# Patient Record
Sex: FEMALE | Race: WHITE | NOT HISPANIC OR LATINO | Employment: OTHER | URBAN - METROPOLITAN AREA
[De-identification: names, ages, dates, MRNs, and addresses within clinical notes are randomized per-mention and may not be internally consistent; named-entity substitution may affect disease eponyms.]

---

## 2017-01-09 RX ORDER — ALENDRONATE SODIUM 70 MG/1
TABLET ORAL
Qty: 12 TABLET | Refills: 1 | Status: SHIPPED | OUTPATIENT
Start: 2017-01-09 | End: 2017-06-26 | Stop reason: SDUPTHER

## 2017-01-12 RX ORDER — LEVOTHYROXINE SODIUM 50 MCG
TABLET ORAL
Qty: 90 TABLET | Refills: 0 | Status: SHIPPED | OUTPATIENT
Start: 2017-01-12 | End: 2017-04-12 | Stop reason: SDUPTHER

## 2017-01-18 DIAGNOSIS — E78.5 HYPERLIPIDEMIA, UNSPECIFIED HYPERLIPIDEMIA TYPE: Primary | ICD-10-CM

## 2017-01-18 DIAGNOSIS — E03.9 HYPOTHYROIDISM, UNSPECIFIED TYPE: ICD-10-CM

## 2017-01-27 LAB
ALBUMIN SERPL-MCNC: 4.2 G/DL (ref 3.5–5.2)
ALBUMIN/GLOB SERPL: 2.1 G/DL
ALP SERPL-CCNC: 70 U/L (ref 39–117)
ALT SERPL-CCNC: 22 U/L (ref 1–33)
AST SERPL-CCNC: 21 U/L (ref 1–32)
BILIRUB SERPL-MCNC: 0.6 MG/DL (ref 0.1–1.2)
BUN SERPL-MCNC: 23 MG/DL (ref 8–23)
BUN/CREAT SERPL: 43.4 (ref 7–25)
CALCIUM SERPL-MCNC: 10.2 MG/DL (ref 8.6–10.5)
CHLORIDE SERPL-SCNC: 106 MMOL/L (ref 98–107)
CHOLEST SERPL-MCNC: 126 MG/DL (ref 100–199)
CO2 SERPL-SCNC: 30.1 MMOL/L (ref 22–29)
CREAT SERPL-MCNC: 0.53 MG/DL (ref 0.57–1)
GLOBULIN SER CALC-MCNC: 2 GM/DL
GLUCOSE SERPL-MCNC: 87 MG/DL (ref 65–99)
HDL SERPL-SCNC: 29.6 UMOL/L
HDLC SERPL-MCNC: 46 MG/DL
LDL SERPL QN: 20.1 NM
LDL SERPL-SCNC: 893 NMOL/L
LDL SMALL SERPL-SCNC: 572 NMOL/L
LDLC SERPL CALC-MCNC: 65 MG/DL (ref 0–99)
POTASSIUM SERPL-SCNC: 3.7 MMOL/L (ref 3.5–5.2)
PROT SERPL-MCNC: 6.2 G/DL (ref 6–8.5)
SODIUM SERPL-SCNC: 145 MMOL/L (ref 136–145)
T4 FREE SERPL-MCNC: 1.54 NG/DL (ref 0.93–1.7)
TRIGL SERPL-MCNC: 76 MG/DL (ref 0–149)
TSH SERPL DL<=0.005 MIU/L-ACNC: 2.74 MIU/ML (ref 0.27–4.2)

## 2017-01-30 RX ORDER — PAROXETINE 10 MG/1
TABLET, FILM COATED ORAL
Qty: 90 TABLET | Refills: 0 | Status: SHIPPED | OUTPATIENT
Start: 2017-01-30 | End: 2017-04-03 | Stop reason: SDUPTHER

## 2017-02-01 ENCOUNTER — OFFICE VISIT (OUTPATIENT)
Dept: FAMILY MEDICINE CLINIC | Facility: CLINIC | Age: 77
End: 2017-02-01

## 2017-02-01 VITALS
BODY MASS INDEX: 23.64 KG/M2 | HEART RATE: 62 BPM | WEIGHT: 156 LBS | OXYGEN SATURATION: 97 % | HEIGHT: 68 IN | TEMPERATURE: 97.7 F | DIASTOLIC BLOOD PRESSURE: 74 MMHG | SYSTOLIC BLOOD PRESSURE: 118 MMHG

## 2017-02-01 DIAGNOSIS — E03.9 ACQUIRED HYPOTHYROIDISM: ICD-10-CM

## 2017-02-01 DIAGNOSIS — M81.0 OSTEOPOROSIS, SENILE: ICD-10-CM

## 2017-02-01 DIAGNOSIS — G47.00 INSOMNIA, UNSPECIFIED TYPE: ICD-10-CM

## 2017-02-01 DIAGNOSIS — F41.9 ANXIETY: ICD-10-CM

## 2017-02-01 DIAGNOSIS — E78.5 HYPERLIPIDEMIA, UNSPECIFIED HYPERLIPIDEMIA TYPE: Primary | ICD-10-CM

## 2017-02-01 DIAGNOSIS — F41.0 PANIC ATTACK: ICD-10-CM

## 2017-02-01 DIAGNOSIS — I10 BENIGN ESSENTIAL HYPERTENSION: ICD-10-CM

## 2017-02-01 PROCEDURE — 99214 OFFICE O/P EST MOD 30 MIN: CPT | Performed by: INTERNAL MEDICINE

## 2017-02-01 RX ORDER — ATORVASTATIN CALCIUM 20 MG/1
20 TABLET, FILM COATED ORAL DAILY
Qty: 90 TABLET | Refills: 3 | Status: SHIPPED | OUTPATIENT
Start: 2017-02-01 | End: 2018-03-26 | Stop reason: SDUPTHER

## 2017-02-01 RX ORDER — PROPRANOLOL HYDROCHLORIDE 40 MG/1
TABLET ORAL
Qty: 180 TABLET | Refills: 1 | Status: SHIPPED | OUTPATIENT
Start: 2017-02-01 | End: 2017-07-31 | Stop reason: SDUPTHER

## 2017-02-01 RX ORDER — LISINOPRIL AND HYDROCHLOROTHIAZIDE 20; 12.5 MG/1; MG/1
1 TABLET ORAL DAILY
Qty: 90 TABLET | Refills: 3 | Status: SHIPPED | OUTPATIENT
Start: 2017-02-01 | End: 2018-02-12 | Stop reason: SDUPTHER

## 2017-02-01 NOTE — PROGRESS NOTES
Subjective   Karolina Ornelas is a 76 y.o. female. Patient is here today for follow-up on her hyperlipidemia, hypertension, hypothyroidism, osteoporosis and anxiety and insomnia.  She generally is been doing well and has no chest pain, shortness of breath, edema or significant myalgias.  She is well stocked on the lorazepam.  She's tolerating her medicines.  She had a bone density test in September that showed stable osteoporosis that is not worsened.  She's tolerating the alendronate.    Chief Complaint   Patient presents with   • Follow-up   • Hypertension          Vitals:    02/01/17 1314   BP: 118/74   Pulse: 62   Temp: 97.7 °F (36.5 °C)   SpO2: 97%     The following portions of the patient's history were reviewed and updated as appropriate: allergies, current medications, past family history, past medical history, past social history, past surgical history and problem list.    Past Medical History   Diagnosis Date   • Benign essential hypertension    • Hyperglycemia       Allergies   Allergen Reactions   • Cefaclor       Social History     Social History   • Marital status: Single     Spouse name: N/A   • Number of children: N/A   • Years of education: N/A     Occupational History   • Not on file.     Social History Main Topics   • Smoking status: Never Smoker   • Smokeless tobacco: Not on file   • Alcohol use No   • Drug use: Not on file   • Sexual activity: Not on file     Other Topics Concern   • Not on file     Social History Narrative        Current Outpatient Prescriptions:   •  alendronate (FOSAMAX) 70 MG tablet, TAKE 1 TABLET ONCE EACH WEEK, Disp: 12 tablet, Rfl: 1  •  atorvastatin (LIPITOR) 20 MG tablet, Take 1 tablet by mouth Daily., Disp: 90 tablet, Rfl: 3  •  lisinopril-hydrochlorothiazide (PRINZIDE,ZESTORETIC) 20-12.5 MG per tablet, Take 1 tablet by mouth Daily., Disp: 90 tablet, Rfl: 3  •  LORazepam (ATIVAN) 1 MG tablet, TAKE ONE TABLET BY MOUTH ONCE DAILY, Disp: 30 tablet, Rfl: 0  •  PARoxetine  (PAXIL) 10 MG tablet, TAKE ONE TABLET BY MOUTH ONCE DAILY IN THE MORNING, Disp: 90 tablet, Rfl: 0  •  propranolol (INDERAL) 40 MG tablet, TAKE 1 TABLET TWICE A DAY, Disp: 180 tablet, Rfl: 1  •  SYNTHROID 50 MCG tablet, TAKE ONE TABLET BY MOUTH ONCE DAILY AS DIRECTED, Disp: 90 tablet, Rfl: 0     Objective     History of Present Illness     Review of Systems   Constitutional: Negative.    HENT: Negative.    Eyes: Negative.    Respiratory: Negative.    Cardiovascular: Negative.    Gastrointestinal: Negative.    Genitourinary: Negative.    Musculoskeletal: Negative.    Skin: Negative.    Neurological: Negative.    Psychiatric/Behavioral: Negative.        Physical Exam   Constitutional: She is oriented to person, place, and time. She appears well-developed and well-nourished.   Pleasant, cooperative and in no distress with a blood pressure of about 120/80   HENT:   Head: Normocephalic and atraumatic.   Eyes: Conjunctivae are normal. Pupils are equal, round, and reactive to light.   Neck: Normal range of motion. Neck supple. No thyromegaly present.   Cardiovascular: Normal rate, regular rhythm and normal heart sounds.    Pulmonary/Chest: Effort normal and breath sounds normal. No respiratory distress. She has no wheezes. She has no rales.   Musculoskeletal: Normal range of motion. She exhibits no edema.   Neurological: She is alert and oriented to person, place, and time.   Skin: Skin is warm and dry.   Psychiatric: She has a normal mood and affect. Her behavior is normal.   Nursing note and vitals reviewed.      ASSESSMENT  overall the patient seems stable.  Her CMP is essentially normal and TSH and free T4 were both normal.  Clinically she is euthyroid on supplementation.  Her lipid panel remains under excellent control with the LDL particle number in the low range.  The patient has had mammograms and bone density     Problem List Items Addressed This Visit        Cardiovascular and Mediastinum    Benign essential  hypertension    Relevant Medications    lisinopril-hydrochlorothiazide (PRINZIDE,ZESTORETIC) 20-12.5 MG per tablet    Hyperlipidemia - Primary    Relevant Medications    atorvastatin (LIPITOR) 20 MG tablet       Endocrine    Acquired hypothyroidism       Other    Anxiety    Insomnia    Panic attack          PLAN  the patient will continue current medicines and a plan on rechecking her back in 4 months with a CMP, NMR lipid panel, TSH    There are no Patient Instructions on file for this visit.  Return in about 4 months (around 6/1/2017) for with labs.

## 2017-02-21 ENCOUNTER — TELEPHONE (OUTPATIENT)
Dept: FAMILY MEDICINE CLINIC | Facility: CLINIC | Age: 77
End: 2017-02-21

## 2017-02-21 NOTE — TELEPHONE ENCOUNTER
I RECEIVED FORM FROM [facility] AND WILL CONTACT THEM TO GET PREAUTHORIZATION.     ----- Message from Nakia Mcneill sent at 2/20/2017  4:03 PM EST -----  PT HAS 1 SCRIPT REFILL OF FOR LORazepam  1MG #30  SHE HAS BEEN ADVISED BY [facility] WILL NEED PRE-AUTHORIZATION IN THE FUTURE TO FILL THE SCRIPT.    [facility] [phone]    IF YOU HAVE ANY QUESTIONS PLEASE CONTACT PT [phone]

## 2017-04-03 RX ORDER — PAROXETINE 10 MG/1
TABLET, FILM COATED ORAL
Qty: 90 TABLET | Refills: 0 | Status: SHIPPED | OUTPATIENT
Start: 2017-04-03 | End: 2017-07-26 | Stop reason: SDUPTHER

## 2017-04-12 RX ORDER — LEVOTHYROXINE SODIUM 50 MCG
TABLET ORAL
Qty: 90 TABLET | Refills: 0 | Status: SHIPPED | OUTPATIENT
Start: 2017-04-12 | End: 2017-07-13 | Stop reason: SDUPTHER

## 2017-06-01 DIAGNOSIS — I10 BENIGN ESSENTIAL HYPERTENSION: Primary | ICD-10-CM

## 2017-06-01 DIAGNOSIS — E03.9 ACQUIRED HYPOTHYROIDISM: ICD-10-CM

## 2017-06-01 DIAGNOSIS — E78.5 HYPERLIPIDEMIA, UNSPECIFIED HYPERLIPIDEMIA TYPE: ICD-10-CM

## 2017-06-09 LAB
ALBUMIN SERPL-MCNC: 4.2 G/DL (ref 3.5–5.2)
ALBUMIN/GLOB SERPL: 1.9 G/DL
ALP SERPL-CCNC: 67 U/L (ref 39–117)
ALT SERPL-CCNC: 19 U/L (ref 1–33)
AST SERPL-CCNC: 19 U/L (ref 1–32)
BILIRUB SERPL-MCNC: 0.6 MG/DL (ref 0.1–1.2)
BUN SERPL-MCNC: 19 MG/DL (ref 8–23)
BUN/CREAT SERPL: 30.6 (ref 7–25)
CALCIUM SERPL-MCNC: 10.3 MG/DL (ref 8.6–10.5)
CHLORIDE SERPL-SCNC: 104 MMOL/L (ref 98–107)
CHOLEST SERPL-MCNC: 130 MG/DL (ref 100–199)
CO2 SERPL-SCNC: 29.1 MMOL/L (ref 22–29)
CREAT SERPL-MCNC: 0.62 MG/DL (ref 0.57–1)
GLOBULIN SER CALC-MCNC: 2.2 GM/DL
GLUCOSE SERPL-MCNC: 91 MG/DL (ref 65–99)
HDL SERPL-SCNC: 29 UMOL/L
HDLC SERPL-MCNC: 44 MG/DL
LDL SERPL QN: 20 NM
LDL SERPL-SCNC: 1153 NMOL/L
LDL SMALL SERPL-SCNC: 802 NMOL/L
LDLC SERPL CALC-MCNC: 70 MG/DL (ref 0–99)
POTASSIUM SERPL-SCNC: 3.7 MMOL/L (ref 3.5–5.2)
PROT SERPL-MCNC: 6.4 G/DL (ref 6–8.5)
SODIUM SERPL-SCNC: 143 MMOL/L (ref 136–145)
TRIGL SERPL-MCNC: 82 MG/DL (ref 0–149)
TSH SERPL DL<=0.005 MIU/L-ACNC: 3.63 MIU/ML (ref 0.27–4.2)

## 2017-06-15 ENCOUNTER — OFFICE VISIT (OUTPATIENT)
Dept: FAMILY MEDICINE CLINIC | Facility: CLINIC | Age: 77
End: 2017-06-15

## 2017-06-15 VITALS
HEIGHT: 68 IN | RESPIRATION RATE: 16 BRPM | DIASTOLIC BLOOD PRESSURE: 74 MMHG | WEIGHT: 158.8 LBS | TEMPERATURE: 98 F | BODY MASS INDEX: 24.07 KG/M2 | SYSTOLIC BLOOD PRESSURE: 112 MMHG | OXYGEN SATURATION: 98 % | HEART RATE: 64 BPM

## 2017-06-15 DIAGNOSIS — E78.5 HYPERLIPIDEMIA, UNSPECIFIED HYPERLIPIDEMIA TYPE: Primary | ICD-10-CM

## 2017-06-15 DIAGNOSIS — I10 BENIGN ESSENTIAL HYPERTENSION: ICD-10-CM

## 2017-06-15 DIAGNOSIS — E03.9 ACQUIRED HYPOTHYROIDISM: ICD-10-CM

## 2017-06-15 DIAGNOSIS — F41.9 ANXIETY: ICD-10-CM

## 2017-06-15 PROCEDURE — 99214 OFFICE O/P EST MOD 30 MIN: CPT | Performed by: INTERNAL MEDICINE

## 2017-06-15 NOTE — PROGRESS NOTES
Subjective   Karolina Ornelas is a 77 y.o. female. Patient is here today for follow-up on her hyperlipidemia, hypertension, hypothyroidism and anxiety.  She is generally been doing well and stop the lorazepam several months ago.  She's had no problems there.  Otherwise she generally feels fine and has no chest pain, shortness of breath, edema or significant myalgias     Chief Complaint   Patient presents with   • Follow-up     Labs           Vitals:    06/15/17 1325   BP: 112/74   Pulse: 64   Resp: 16   Temp: 98 °F (36.7 °C)   SpO2: 98%     The following portions of the patient's history were reviewed and updated as appropriate: allergies, current medications, past family history, past medical history, past social history, past surgical history and problem list.    Past Medical History:   Diagnosis Date   • Benign essential hypertension    • Hyperglycemia       Allergies   Allergen Reactions   • Cefaclor       Social History     Social History   • Marital status: Single     Spouse name: N/A   • Number of children: N/A   • Years of education: N/A     Occupational History   • Not on file.     Social History Main Topics   • Smoking status: Never Smoker   • Smokeless tobacco: Never Used   • Alcohol use No   • Drug use: Not on file   • Sexual activity: Not on file     Other Topics Concern   • Not on file     Social History Narrative        Current Outpatient Prescriptions:   •  alendronate (FOSAMAX) 70 MG tablet, TAKE 1 TABLET ONCE EACH WEEK, Disp: 12 tablet, Rfl: 1  •  atorvastatin (LIPITOR) 20 MG tablet, Take 1 tablet by mouth Daily., Disp: 90 tablet, Rfl: 3  •  lisinopril-hydrochlorothiazide (PRINZIDE,ZESTORETIC) 20-12.5 MG per tablet, Take 1 tablet by mouth Daily., Disp: 90 tablet, Rfl: 3  •  PARoxetine (PAXIL) 10 MG tablet, TAKE ONE TABLET BY MOUTH IN THE MORNING, Disp: 90 tablet, Rfl: 0  •  propranolol (INDERAL) 40 MG tablet, TAKE 1 TABLET TWICE A DAY, Disp: 180 tablet, Rfl: 1  •  SYNTHROID 50 MCG tablet, TAKE ONE  TABLET BY MOUTH ONCE DAILY AS DIRECTED, Disp: 90 tablet, Rfl: 0     Objective     History of Present Illness     Review of Systems   Constitutional: Negative.    HENT: Negative.    Eyes: Negative.    Respiratory: Negative.    Cardiovascular: Negative.    Gastrointestinal: Negative.    Genitourinary: Negative.    Musculoskeletal: Negative.    Skin: Negative.    Neurological: Negative.    Psychiatric/Behavioral: Negative.        Physical Exam   Constitutional: She appears well-developed and well-nourished.   Pleasant, cooperative and in no distress with a blood pressure of 120/80   HENT:   Head: Normocephalic and atraumatic.   Eyes: Conjunctivae are normal.   Neck: Normal range of motion. Neck supple.   Cardiovascular: Normal rate, regular rhythm and normal heart sounds.    Pulmonary/Chest: Effort normal and breath sounds normal. No respiratory distress. She has no wheezes. She has no rales.   Musculoskeletal: Normal range of motion.   Neurological: She is alert.   Skin: Skin is warm and dry.   Psychiatric: She has a normal mood and affect. Her behavior is normal.   Nursing note and vitals reviewed.      ASSESSMENT  overall the patient seems stable.  Her blood pressures fine and heart and lungs sound fine.  TSH was normal.  Her lipid panel is acceptable with a total cholesterol of 130 HDL of 44 LDL of 70 and a particle number in the moderate range.  CMP is essentially normal and stable.  The patient has 2 benign skin lesions on her back at the dermatologist may removed.  She is doing fine off the lorazepam.     Problem List Items Addressed This Visit        Cardiovascular and Mediastinum    Benign essential hypertension    Hyperlipidemia - Primary       Endocrine    Acquired hypothyroidism       Other    Anxiety          PLAN  Patient will continue current medicines and I'll recheck her in 4 months with a CBC, CMP, NMR lipid panel and TSH    There are no Patient Instructions on file for this visit.  Return in about 4  months (around 10/15/2017) for with labs.

## 2017-06-26 RX ORDER — ALENDRONATE SODIUM 70 MG/1
TABLET ORAL
Qty: 12 TABLET | Refills: 0 | Status: SHIPPED | OUTPATIENT
Start: 2017-06-26 | End: 2017-08-26 | Stop reason: SDUPTHER

## 2017-07-13 RX ORDER — LEVOTHYROXINE SODIUM 50 MCG
TABLET ORAL
Qty: 90 TABLET | Refills: 1 | Status: SHIPPED | OUTPATIENT
Start: 2017-07-13 | End: 2018-01-08 | Stop reason: SDUPTHER

## 2017-07-27 RX ORDER — PAROXETINE 10 MG/1
TABLET, FILM COATED ORAL
Qty: 90 TABLET | Refills: 3 | Status: SHIPPED | OUTPATIENT
Start: 2017-07-27 | End: 2018-07-30 | Stop reason: SDUPTHER

## 2017-07-31 RX ORDER — PROPRANOLOL HYDROCHLORIDE 40 MG/1
TABLET ORAL
Qty: 180 TABLET | Refills: 0 | Status: SHIPPED | OUTPATIENT
Start: 2017-07-31 | End: 2017-10-29 | Stop reason: SDUPTHER

## 2017-08-28 RX ORDER — ALENDRONATE SODIUM 70 MG/1
TABLET ORAL
Qty: 12 TABLET | Refills: 0 | Status: SHIPPED | OUTPATIENT
Start: 2017-08-28 | End: 2017-11-20 | Stop reason: SDUPTHER

## 2017-08-30 ENCOUNTER — TELEPHONE (OUTPATIENT)
Dept: FAMILY MEDICINE CLINIC | Facility: CLINIC | Age: 77
End: 2017-08-30

## 2017-08-30 NOTE — TELEPHONE ENCOUNTER
RX HAS BEEN SENT TO THE PHARMACY FOR PT.     ----- Message from Melissa Bowden MA sent at 8/29/2017  4:06 PM EDT -----  Contact: PT  NEEDS RX FOR ALENDRONATE 70 MG 1 TIME A DAY SENT TO [facility] / PT CAN BE REACHED [phone] IF THERE ARE ANY QUESTIONS

## 2017-10-02 DIAGNOSIS — E03.9 ACQUIRED HYPOTHYROIDISM: ICD-10-CM

## 2017-10-02 DIAGNOSIS — E78.5 HYPERLIPIDEMIA, UNSPECIFIED HYPERLIPIDEMIA TYPE: ICD-10-CM

## 2017-10-08 LAB
ALBUMIN SERPL-MCNC: 4.1 G/DL (ref 3.5–5.2)
ALBUMIN/GLOB SERPL: 2.1 G/DL
ALP SERPL-CCNC: 60 U/L (ref 39–117)
ALT SERPL-CCNC: 17 U/L (ref 1–33)
AST SERPL-CCNC: 15 U/L (ref 1–32)
BASOPHILS # BLD AUTO: 0.02 10*3/MM3 (ref 0–0.2)
BASOPHILS NFR BLD AUTO: 0.3 % (ref 0–1.5)
BILIRUB SERPL-MCNC: 0.5 MG/DL (ref 0.1–1.2)
BUN SERPL-MCNC: 23 MG/DL (ref 8–23)
BUN/CREAT SERPL: 40.4 (ref 7–25)
CALCIUM SERPL-MCNC: 10.1 MG/DL (ref 8.6–10.5)
CHLORIDE SERPL-SCNC: 105 MMOL/L (ref 98–107)
CHOLEST SERPL-MCNC: 122 MG/DL (ref 100–199)
CO2 SERPL-SCNC: 28.6 MMOL/L (ref 22–29)
CREAT SERPL-MCNC: 0.57 MG/DL (ref 0.57–1)
EOSINOPHIL # BLD AUTO: 0.25 10*3/MM3 (ref 0–0.7)
EOSINOPHIL NFR BLD AUTO: 3.5 % (ref 0.3–6.2)
ERYTHROCYTE [DISTWIDTH] IN BLOOD BY AUTOMATED COUNT: 14.2 % (ref 11.7–13)
GLOBULIN SER CALC-MCNC: 2 GM/DL
GLUCOSE SERPL-MCNC: 89 MG/DL (ref 65–99)
HCT VFR BLD AUTO: 41.3 % (ref 35.6–45.5)
HDL SERPL-SCNC: 26.6 UMOL/L
HDLC SERPL-MCNC: 39 MG/DL
HGB BLD-MCNC: 12.9 G/DL (ref 11.9–15.5)
IMM GRANULOCYTES # BLD: 0 10*3/MM3 (ref 0–0.03)
IMM GRANULOCYTES NFR BLD: 0 % (ref 0–0.5)
LDL SERPL QN: 20 NM
LDL SERPL-SCNC: 858 NMOL/L
LDL SMALL SERPL-SCNC: 509 NMOL/L
LDLC SERPL CALC-MCNC: 65 MG/DL (ref 0–99)
LYMPHOCYTES # BLD AUTO: 2.32 10*3/MM3 (ref 0.9–4.8)
LYMPHOCYTES NFR BLD AUTO: 32 % (ref 19.6–45.3)
MCH RBC QN AUTO: 29.3 PG (ref 26.9–32)
MCHC RBC AUTO-ENTMCNC: 31.2 G/DL (ref 32.4–36.3)
MCV RBC AUTO: 93.7 FL (ref 80.5–98.2)
MONOCYTES # BLD AUTO: 0.58 10*3/MM3 (ref 0.2–1.2)
MONOCYTES NFR BLD AUTO: 8 % (ref 5–12)
NEUTROPHILS # BLD AUTO: 4.07 10*3/MM3 (ref 1.9–8.1)
NEUTROPHILS NFR BLD AUTO: 56.2 % (ref 42.7–76)
PLATELET # BLD AUTO: 143 10*3/MM3 (ref 140–500)
POTASSIUM SERPL-SCNC: 3.9 MMOL/L (ref 3.5–5.2)
PROT SERPL-MCNC: 6.1 G/DL (ref 6–8.5)
RBC # BLD AUTO: 4.41 10*6/MM3 (ref 3.9–5.2)
SODIUM SERPL-SCNC: 145 MMOL/L (ref 136–145)
TRIGL SERPL-MCNC: 89 MG/DL (ref 0–149)
TSH SERPL DL<=0.005 MIU/L-ACNC: 5.32 MIU/ML (ref 0.27–4.2)
WBC # BLD AUTO: 7.24 10*3/MM3 (ref 4.5–10.7)

## 2017-10-12 ENCOUNTER — OFFICE VISIT (OUTPATIENT)
Dept: FAMILY MEDICINE CLINIC | Facility: CLINIC | Age: 77
End: 2017-10-12

## 2017-10-12 VITALS
HEIGHT: 68 IN | DIASTOLIC BLOOD PRESSURE: 64 MMHG | TEMPERATURE: 97.6 F | OXYGEN SATURATION: 99 % | BODY MASS INDEX: 24.31 KG/M2 | SYSTOLIC BLOOD PRESSURE: 108 MMHG | HEART RATE: 65 BPM | WEIGHT: 160.4 LBS

## 2017-10-12 DIAGNOSIS — E03.9 ACQUIRED HYPOTHYROIDISM: ICD-10-CM

## 2017-10-12 DIAGNOSIS — F41.0 PANIC ATTACK: ICD-10-CM

## 2017-10-12 DIAGNOSIS — Z23 INFLUENZA VACCINE NEEDED: Primary | ICD-10-CM

## 2017-10-12 DIAGNOSIS — E78.5 HYPERLIPIDEMIA, UNSPECIFIED HYPERLIPIDEMIA TYPE: ICD-10-CM

## 2017-10-12 DIAGNOSIS — I10 BENIGN ESSENTIAL HYPERTENSION: ICD-10-CM

## 2017-10-12 DIAGNOSIS — F41.9 ANXIETY: ICD-10-CM

## 2017-10-12 PROCEDURE — G0008 ADMIN INFLUENZA VIRUS VAC: HCPCS | Performed by: INTERNAL MEDICINE

## 2017-10-12 PROCEDURE — 99214 OFFICE O/P EST MOD 30 MIN: CPT | Performed by: INTERNAL MEDICINE

## 2017-10-12 NOTE — PROGRESS NOTES
Subjective   Karolina Ornelas is a 77 y.o. female. Patient is here today for a lump on her hypertension, hyperlipidemia, hypothyroidism and anxiety and panic attacks.  She is generally been doing well and is had no chest pain, shortness of breath, edema or myalgias.  She does want a flu shot today.    Chief Complaint   Patient presents with   • Hyperlipidemia     lab follow up          Vitals:    10/12/17 1255   BP: 108/64   Pulse: 65   Temp: 97.6 °F (36.4 °C)   SpO2: 99%     The following portions of the patient's history were reviewed and updated as appropriate: allergies, current medications, past family history, past medical history, past social history, past surgical history and problem list.    Past Medical History:   Diagnosis Date   • Benign essential hypertension    • Hyperglycemia       Allergies   Allergen Reactions   • Cefaclor       Social History     Social History   • Marital status: Single     Spouse name: N/A   • Number of children: N/A   • Years of education: N/A     Occupational History   • Not on file.     Social History Main Topics   • Smoking status: Never Smoker   • Smokeless tobacco: Never Used   • Alcohol use No   • Drug use: Not on file   • Sexual activity: Not on file     Other Topics Concern   • Not on file     Social History Narrative        Current Outpatient Prescriptions:   •  alendronate (FOSAMAX) 70 MG tablet, TAKE 1 TABLET ONCE EACH WEEK, Disp: 12 tablet, Rfl: 0  •  atorvastatin (LIPITOR) 20 MG tablet, Take 1 tablet by mouth Daily., Disp: 90 tablet, Rfl: 3  •  lisinopril-hydrochlorothiazide (PRINZIDE,ZESTORETIC) 20-12.5 MG per tablet, Take 1 tablet by mouth Daily., Disp: 90 tablet, Rfl: 3  •  PARoxetine (PAXIL) 10 MG tablet, TAKE ONE TABLET BY MOUTH IN THE MORNING, Disp: 90 tablet, Rfl: 3  •  propranolol (INDERAL) 40 MG tablet, TAKE 1 TABLET TWICE A DAY, Disp: 180 tablet, Rfl: 0  •  SYNTHROID 50 MCG tablet, TAKE ONE TABLET BY MOUTH ONCE DAILY AS DIRECTED, Disp: 90 tablet, Rfl: 1      Objective     History of Present Illness     Review of Systems   Constitutional: Negative.    HENT: Negative.    Eyes: Negative.    Respiratory: Negative.    Cardiovascular: Negative.    Gastrointestinal: Negative.    Endocrine: Negative.    Genitourinary: Negative.    Musculoskeletal: Negative.    Neurological: Negative.    Psychiatric/Behavioral: Negative.        Physical Exam   Constitutional: She is oriented to person, place, and time. She appears well-developed and well-nourished.   Pleasant, cooperative and in no distress   HENT:   Head: Normocephalic and atraumatic.   Eyes: Conjunctivae are normal. Pupils are equal, round, and reactive to light. No scleral icterus.   Neck: Normal range of motion. Neck supple. No thyromegaly present.   Cardiovascular: Normal rate, regular rhythm and normal heart sounds.    Pulmonary/Chest: Effort normal and breath sounds normal. No respiratory distress. She has no wheezes. She has no rales.   Musculoskeletal: Normal range of motion. She exhibits no edema.   Neurological: She is alert and oriented to person, place, and time.   Skin: Skin is warm and dry.   Psychiatric: She has a normal mood and affect. Her behavior is normal.   Nursing note and vitals reviewed.      ASSESSMENT  CBC is normal.  CMP is also essentially normal.  TSH was slightly high on the same dose.  NMR lipid panel is generally quite good with a total cholesterol 122, chronically low HDL of 39 but an LDL of 65 with the particle number in the low range  #1-hypertension, well controlled  #2-hyperlipidemia excellent control  #3-hypothyroidism, clinically euthyroid but with a slightly high TSH today  #4-chronic anxiety and panic attacks, controlled on medication     Problem List Items Addressed This Visit        Cardiovascular and Mediastinum    Benign essential hypertension    Hyperlipidemia       Endocrine    Acquired hypothyroidism       Other    Anxiety    Panic attack      Other Visit Diagnoses      Influenza vaccine needed    -  Primary    Relevant Orders    Flu Vaccine High Dose PF 65YR+ (Completed)          PLAN  The patient received a flu shot today.  She will continue her other medications including the Synthroid 50 µg daily.  I would like to recheck her in 4 months with a CMP, NMR lipid panel, TSH and free T4    There are no Patient Instructions on file for this visit.  Return in about 4 months (around 2/12/2018) for with labs.

## 2017-10-30 RX ORDER — PROPRANOLOL HYDROCHLORIDE 40 MG/1
TABLET ORAL
Qty: 180 TABLET | Refills: 0 | Status: SHIPPED | OUTPATIENT
Start: 2017-10-30 | End: 2018-01-28 | Stop reason: SDUPTHER

## 2017-11-20 RX ORDER — ALENDRONATE SODIUM 70 MG/1
TABLET ORAL
Qty: 12 TABLET | Refills: 0 | Status: SHIPPED | OUTPATIENT
Start: 2017-11-20 | End: 2018-02-12 | Stop reason: SDUPTHER

## 2018-01-08 RX ORDER — LEVOTHYROXINE SODIUM 50 MCG
TABLET ORAL
Qty: 90 TABLET | Refills: 1 | Status: SHIPPED | OUTPATIENT
Start: 2018-01-08 | End: 2018-07-05 | Stop reason: SDUPTHER

## 2018-01-29 RX ORDER — PROPRANOLOL HYDROCHLORIDE 40 MG/1
TABLET ORAL
Qty: 180 TABLET | Refills: 0 | Status: SHIPPED | OUTPATIENT
Start: 2018-01-29 | End: 2018-04-28 | Stop reason: SDUPTHER

## 2018-02-02 DIAGNOSIS — E78.5 HYPERLIPIDEMIA, UNSPECIFIED HYPERLIPIDEMIA TYPE: ICD-10-CM

## 2018-02-02 DIAGNOSIS — E03.9 ACQUIRED HYPOTHYROIDISM: ICD-10-CM

## 2018-02-11 LAB
ALBUMIN SERPL-MCNC: 4.3 G/DL (ref 3.5–5.2)
ALBUMIN/GLOB SERPL: 2 G/DL
ALP SERPL-CCNC: 60 U/L (ref 39–117)
ALT SERPL-CCNC: 16 U/L (ref 1–33)
AST SERPL-CCNC: 18 U/L (ref 1–32)
BILIRUB SERPL-MCNC: 0.5 MG/DL (ref 0.1–1.2)
BUN SERPL-MCNC: 25 MG/DL (ref 8–23)
BUN/CREAT SERPL: 41.7 (ref 7–25)
CALCIUM SERPL-MCNC: 9.6 MG/DL (ref 8.6–10.5)
CHLORIDE SERPL-SCNC: 105 MMOL/L (ref 98–107)
CHOLEST SERPL-MCNC: 141 MG/DL (ref 100–199)
CO2 SERPL-SCNC: 32 MMOL/L (ref 22–29)
CREAT SERPL-MCNC: 0.6 MG/DL (ref 0.57–1)
GFR SERPLBLD CREATININE-BSD FMLA CKD-EPI: 117 ML/MIN/1.73
GFR SERPLBLD CREATININE-BSD FMLA CKD-EPI: 96 ML/MIN/1.73
GLOBULIN SER CALC-MCNC: 2.1 GM/DL
GLUCOSE SERPL-MCNC: 90 MG/DL (ref 65–99)
HDL SERPL-SCNC: 29.1 UMOL/L
HDLC SERPL-MCNC: 42 MG/DL
LDL SERPL QN: 20.2 NM
LDL SERPL-SCNC: 1023 NMOL/L
LDL SMALL SERPL-SCNC: 671 NMOL/L
LDLC SERPL CALC-MCNC: 78 MG/DL (ref 0–99)
POTASSIUM SERPL-SCNC: 3.7 MMOL/L (ref 3.5–5.2)
PROT SERPL-MCNC: 6.4 G/DL (ref 6–8.5)
SODIUM SERPL-SCNC: 144 MMOL/L (ref 136–145)
T4 FREE SERPL-MCNC: 1.58 NG/DL (ref 0.93–1.7)
TRIGL SERPL-MCNC: 106 MG/DL (ref 0–149)
TSH SERPL DL<=0.005 MIU/L-ACNC: 4.12 MIU/ML (ref 0.27–4.2)

## 2018-02-12 RX ORDER — LISINOPRIL AND HYDROCHLOROTHIAZIDE 20; 12.5 MG/1; MG/1
TABLET ORAL
Qty: 90 TABLET | Refills: 3 | Status: SHIPPED | OUTPATIENT
Start: 2018-02-12 | End: 2019-02-11 | Stop reason: SDUPTHER

## 2018-02-12 RX ORDER — ALENDRONATE SODIUM 70 MG/1
TABLET ORAL
Qty: 12 TABLET | Refills: 1 | Status: SHIPPED | OUTPATIENT
Start: 2018-02-12 | End: 2018-07-05 | Stop reason: SDUPTHER

## 2018-02-15 ENCOUNTER — OFFICE VISIT (OUTPATIENT)
Dept: FAMILY MEDICINE CLINIC | Facility: CLINIC | Age: 78
End: 2018-02-15

## 2018-02-15 VITALS
SYSTOLIC BLOOD PRESSURE: 110 MMHG | BODY MASS INDEX: 24.74 KG/M2 | DIASTOLIC BLOOD PRESSURE: 70 MMHG | WEIGHT: 163.2 LBS | TEMPERATURE: 97.9 F | HEART RATE: 68 BPM | OXYGEN SATURATION: 97 % | HEIGHT: 68 IN

## 2018-02-15 DIAGNOSIS — E03.9 ACQUIRED HYPOTHYROIDISM: ICD-10-CM

## 2018-02-15 DIAGNOSIS — F41.0 PANIC ATTACK: ICD-10-CM

## 2018-02-15 DIAGNOSIS — G47.00 INSOMNIA, UNSPECIFIED TYPE: ICD-10-CM

## 2018-02-15 DIAGNOSIS — I10 BENIGN ESSENTIAL HYPERTENSION: ICD-10-CM

## 2018-02-15 DIAGNOSIS — F41.9 ANXIETY: ICD-10-CM

## 2018-02-15 DIAGNOSIS — E87.6 HYPOKALEMIA: ICD-10-CM

## 2018-02-15 DIAGNOSIS — E78.5 HYPERLIPIDEMIA, UNSPECIFIED HYPERLIPIDEMIA TYPE: Primary | ICD-10-CM

## 2018-02-15 PROCEDURE — 99214 OFFICE O/P EST MOD 30 MIN: CPT | Performed by: INTERNAL MEDICINE

## 2018-02-15 NOTE — PROGRESS NOTES
Subjective   Karolina Ornelas is a 77 y.o. female. Patient is here today for follow-up on her hypertension hyperlipidemia and hypothyroidism.  She also has some chronic anxiety.  She is a little concerned about some swelling in her legs but is having no real discomfort and no breathing problems.  She says her shoes feel a little tight at the end of the day and better in the morning.  She's had no calf pain.  Chief Complaint   Patient presents with   • Hypothyroidism     HLD, HTN- FOLLOW UP LABS   • Foot Swelling     PT HAVING SWELLING IN ANKLES           Vitals:    02/15/18 1306   BP: 110/70   Pulse: 68   Temp: 97.9 °F (36.6 °C)   SpO2: 97%     The following portions of the patient's history were reviewed and updated as appropriate: allergies, current medications, past family history, past medical history, past social history, past surgical history and problem list.    Past Medical History:   Diagnosis Date   • Benign essential hypertension    • Hyperglycemia       Allergies   Allergen Reactions   • Cefaclor       Social History     Social History   • Marital status: Single     Spouse name: N/A   • Number of children: N/A   • Years of education: N/A     Occupational History   • Not on file.     Social History Main Topics   • Smoking status: Never Smoker   • Smokeless tobacco: Never Used   • Alcohol use No   • Drug use: Not on file   • Sexual activity: Not on file     Other Topics Concern   • Not on file     Social History Narrative        Current Outpatient Prescriptions:   •  alendronate (FOSAMAX) 70 MG tablet, TAKE 1 TABLET ONCE EACH WEEK, Disp: 12 tablet, Rfl: 1  •  atorvastatin (LIPITOR) 20 MG tablet, Take 1 tablet by mouth Daily., Disp: 90 tablet, Rfl: 3  •  lisinopril-hydrochlorothiazide (PRINZIDE,ZESTORETIC) 20-12.5 MG per tablet, TAKE ONE TABLET BY MOUTH ONCE DAILY, Disp: 90 tablet, Rfl: 3  •  PARoxetine (PAXIL) 10 MG tablet, TAKE ONE TABLET BY MOUTH IN THE MORNING, Disp: 90 tablet, Rfl: 3  •  propranolol  (INDERAL) 40 MG tablet, TAKE 1 TABLET TWICE A DAY, Disp: 180 tablet, Rfl: 0  •  SYNTHROID 50 MCG tablet, TAKE ONE TABLET BY MOUTH ONCE DAILY AS DIRECTED, Disp: 90 tablet, Rfl: 1     Objective     History of Present Illness     Review of Systems   Constitutional: Negative.    HENT: Negative.    Eyes: Negative.    Respiratory: Negative.    Cardiovascular: Positive for leg swelling.   Gastrointestinal: Negative.    Endocrine: Negative.    Genitourinary: Negative.    Musculoskeletal: Negative.    Neurological: Negative.    Psychiatric/Behavioral: Negative.        Physical Exam   Constitutional: She is oriented to person, place, and time. She appears well-developed and well-nourished.   Pleasant, cooperative no distress blood pressure 110/70   HENT:   Head: Normocephalic and atraumatic.   Eyes: Conjunctivae are normal. Pupils are equal, round, and reactive to light. No scleral icterus.   Neck: Normal range of motion. Neck supple. No thyromegaly present.   Cardiovascular: Normal rate, regular rhythm and normal heart sounds.    Pulmonary/Chest: Effort normal and breath sounds normal. No respiratory distress. She has no wheezes. She has no rales.   Musculoskeletal: Normal range of motion. She exhibits edema.   There is trace to 1+ edema in the ankles and lower legs but no tenderness, palpable cords or erythema   Neurological: She is alert and oriented to person, place, and time.   Skin: Skin is warm and dry.   Psychiatric: She has a normal mood and affect. Her behavior is normal.   Nursing note and vitals reviewed.      ASSESSMENT  CMP is normal.  NMR lipid panel was stable with a total cholesterol 141, HDL 42, LDL 78 particle number in the moderate range.  TSH and free T4 were both normal  #1-hypertension, well controlled today  #2-hyperlipidemia, adequately controlled on medication  #3-hypothyroidism, euthyroid on replacement  #4-mild dependent edema, essentially asymptomatic  #5-anxiety and panic attacks and insomnia,  controlled on paroxetine     Problem List Items Addressed This Visit        Cardiovascular and Mediastinum    Benign essential hypertension    Hyperlipidemia - Primary       Endocrine    Acquired hypothyroidism       Other    Anxiety    Insomnia    Panic attack      Other Visit Diagnoses     Hypokalemia              PLAN  The patient will continue current medicines as now.  Plan on rechecking her in 4 months with a CMP, lipid panel, TSH    There are no Patient Instructions on file for this visit.  Return in about 4 months (around 6/15/2018) for with labs.

## 2018-03-16 ENCOUNTER — OFFICE VISIT (OUTPATIENT)
Dept: FAMILY MEDICINE CLINIC | Facility: CLINIC | Age: 78
End: 2018-03-16

## 2018-03-16 VITALS
BODY MASS INDEX: 27.99 KG/M2 | HEIGHT: 65 IN | DIASTOLIC BLOOD PRESSURE: 78 MMHG | HEART RATE: 67 BPM | RESPIRATION RATE: 16 BRPM | WEIGHT: 168 LBS | SYSTOLIC BLOOD PRESSURE: 118 MMHG | TEMPERATURE: 97.4 F | OXYGEN SATURATION: 98 %

## 2018-03-16 DIAGNOSIS — Z00.00 MEDICARE ANNUAL WELLNESS VISIT, SUBSEQUENT: Primary | ICD-10-CM

## 2018-03-16 PROCEDURE — G0439 PPPS, SUBSEQ VISIT: HCPCS | Performed by: NURSE PRACTITIONER

## 2018-03-16 NOTE — PROGRESS NOTES
QUICK REFERENCE INFORMATION:  The ABCs of the Annual Wellness Visit    Subsequent Medicare Wellness Visit    HEALTH RISK ASSESSMENT    1940    Recent Hospitalizations:  No hospitalization(s) within the last year..        Current Medical Providers:  Patient Care Team:  Mak Lemos MD as PCP - General        Smoking Status:  History   Smoking Status   • Never Smoker   Smokeless Tobacco   • Never Used       Alcohol Consumption:  History   Alcohol Use No       Depression Screen:   PHQ-2/PHQ-9 Depression Screening 3/16/2018   Little interest or pleasure in doing things 0   Feeling down, depressed, or hopeless 0   Total Score 0       Health Habits and Functional and Cognitive Screening:  Functional & Cognitive Status 3/16/2018   Do you have difficulty preparing food and eating? No   Do you have difficulty bathing yourself, getting dressed or grooming yourself? No   Do you have difficulty using the toilet? No   Do you have difficulty moving around from place to place? Yes   Do you have trouble with steps or getting out of a bed or a chair? Yes   In the past year have you fallen or experienced a near fall? No   Current Diet Well Balanced Diet   Dental Exam Up to date   Eye Exam Up to date   Exercise (times per week) 2 times per week   Current Exercise Activities Include Aerobics   Do you need help using the phone?  No   Are you deaf or do you have serious difficulty hearing?  No   Do you need help with transportation? Yes   Do you need help shopping? No   Do you need help preparing meals?  No   Do you need help with housework?  No   Do you need help with laundry? No   Do you need help taking your medications? No   Do you need help managing money? No   Have you felt unusual stress, anger or loneliness in the last month? No   Who do you live with? Alone   If you need help, do you have trouble finding someone available to you? No   Have you been bothered in the last four weeks by sexual problems? No   Do you have  difficulty concentrating, remembering or making decisions? No           Does the patient have evidence of cognitive impairment? No    Aspirin use counseling: Does not need ASA (and currently is not on it)      Recent Lab Results:  CMP:  Lab Results   Component Value Date    GLU 90 02/08/2018    BUN 25 (H) 02/08/2018    CREATININE 0.60 02/08/2018    EGFRIFNONA 96 02/08/2018    EGFRIFAFRI 117 02/08/2018    BCR 41.7 (H) 02/08/2018     02/08/2018    K 3.7 02/08/2018    CO2 32.0 (H) 02/08/2018    CALCIUM 9.6 02/08/2018    PROTENTOTREF 6.4 02/08/2018    ALBUMIN 4.30 02/08/2018    LABGLOBREF 2.1 02/08/2018    LABIL2 2.0 02/08/2018    BILITOT 0.5 02/08/2018    ALKPHOS 60 02/08/2018    AST 18 02/08/2018    ALT 16 02/08/2018     Lipid Panel:  Lab Results   Component Value Date    TRIG 106 02/08/2018     HbA1c:       Visual Acuity:  No exam data present    Age-appropriate Screening Schedule:  Refer to the list below for future screening recommendations based on patient's age, sex and/or medical conditions. Orders for these recommended tests are listed in the plan section. The patient has been provided with a written plan.    Health Maintenance   Topic Date Due   • MAMMOGRAM  09/16/2018   • DXA SCAN  09/29/2018   • LIPID PANEL  02/08/2019   • TDAP/TD VACCINES (2 - Td) 09/18/2025   • INFLUENZA VACCINE  Completed   • PNEUMOCOCCAL VACCINES (65+ LOW/MEDIUM RISK)  Completed   • ZOSTER VACCINE  Completed        Subjective   History of Present Illness    Karolina Ornelas is a 78 y.o. female who presents for an Subsequent Wellness Visit.    The following portions of the patient's history were reviewed and updated as appropriate: allergies, current medications, past family history, past medical history, past social history, past surgical history and problem list.    Outpatient Medications Prior to Visit   Medication Sig Dispense Refill   • alendronate (FOSAMAX) 70 MG tablet TAKE 1 TABLET ONCE EACH WEEK 12 tablet 1   • atorvastatin  "(LIPITOR) 20 MG tablet Take 1 tablet by mouth Daily. 90 tablet 3   • lisinopril-hydrochlorothiazide (PRINZIDE,ZESTORETIC) 20-12.5 MG per tablet TAKE ONE TABLET BY MOUTH ONCE DAILY 90 tablet 3   • PARoxetine (PAXIL) 10 MG tablet TAKE ONE TABLET BY MOUTH IN THE MORNING 90 tablet 3   • propranolol (INDERAL) 40 MG tablet TAKE 1 TABLET TWICE A  tablet 0   • SYNTHROID 50 MCG tablet TAKE ONE TABLET BY MOUTH ONCE DAILY AS DIRECTED 90 tablet 1     No facility-administered medications prior to visit.        Patient Active Problem List   Diagnosis   • Anxiety   • Benign essential hypertension   • Hyperlipidemia   • Acquired hypothyroidism   • Insomnia   • Panic attack   • Osteoporosis, senile       Advance Care Planning:  has an advance directive - a copy HAS NOT been provided. Have asked the patient to send this to us to add to record.    Identification of Risk Factors:  Risk factors include: cardiovascular risk.    Review of Systems    Compared to one year ago, the patient feels her physical health is the same.  Compared to one year ago, the patient feels her mental health is the same.    Objective     Physical Exam    Vitals:    03/16/18 1257   BP: 118/78   Pulse: 67   Resp: 16   Temp: 97.4 °F (36.3 °C)   SpO2: 98%   Weight: 76.2 kg (168 lb)   Height: 165.1 cm (65\")   PainSc:   4   PainLoc: Back       Body mass index is 27.96 kg/m².  Discussed the patient's BMI with her. BMI is above normal parameters. Follow-up plan includes:  no follow-up required.    Assessment/Plan   Patient Self-Management and Personalized Health Advice  The patient has been provided with information about: prevention of cardiac or vascular disease and preventive services including:   · Advance directive, Fall Risk assessment done.    Visit Diagnoses:    ICD-10-CM ICD-9-CM   1. Medicare annual wellness visit, subsequent Z00.00 V70.0       No orders of the defined types were placed in this encounter.      Outpatient Encounter Prescriptions as of " 3/16/2018   Medication Sig Dispense Refill   • alendronate (FOSAMAX) 70 MG tablet TAKE 1 TABLET ONCE EACH WEEK 12 tablet 1   • atorvastatin (LIPITOR) 20 MG tablet Take 1 tablet by mouth Daily. 90 tablet 3   • lisinopril-hydrochlorothiazide (PRINZIDE,ZESTORETIC) 20-12.5 MG per tablet TAKE ONE TABLET BY MOUTH ONCE DAILY 90 tablet 3   • PARoxetine (PAXIL) 10 MG tablet TAKE ONE TABLET BY MOUTH IN THE MORNING 90 tablet 3   • propranolol (INDERAL) 40 MG tablet TAKE 1 TABLET TWICE A  tablet 0   • SYNTHROID 50 MCG tablet TAKE ONE TABLET BY MOUTH ONCE DAILY AS DIRECTED 90 tablet 1     No facility-administered encounter medications on file as of 3/16/2018.        Reviewed use of high risk medication in the elderly: yes  Reviewed for potential of harmful drug interactions in the elderly: yes    Follow Up:  No Follow-up on file.     An After Visit Summary and PPPS with all of these plans were given to the patient.

## 2018-03-27 RX ORDER — ATORVASTATIN CALCIUM 20 MG/1
TABLET, FILM COATED ORAL
Qty: 90 TABLET | Refills: 3 | Status: SHIPPED | OUTPATIENT
Start: 2018-03-27 | End: 2019-03-28 | Stop reason: SDUPTHER

## 2018-04-25 ENCOUNTER — TELEPHONE (OUTPATIENT)
Dept: FAMILY MEDICINE CLINIC | Facility: CLINIC | Age: 78
End: 2018-04-25

## 2018-04-25 NOTE — TELEPHONE ENCOUNTER
Did not see any documentation of where patient had the Hep A shot. Called and advised her of this.     ----- Message from Belinda Mead sent at 4/25/2018  9:20 AM EDT -----  PT IS WANTING TO KNOW IF SHE HAS EVER HAD THE HEP A SHOT AND WHEN   PLEASE CALL PT TO LET HER KNOW     PHONE # [phone]

## 2018-04-30 RX ORDER — PROPRANOLOL HYDROCHLORIDE 40 MG/1
TABLET ORAL
Qty: 180 TABLET | Refills: 1 | Status: SHIPPED | OUTPATIENT
Start: 2018-04-30 | End: 2018-07-05 | Stop reason: SDUPTHER

## 2018-06-20 DIAGNOSIS — E03.9 ACQUIRED HYPOTHYROIDISM: ICD-10-CM

## 2018-06-20 DIAGNOSIS — E78.5 HYPERLIPIDEMIA, UNSPECIFIED HYPERLIPIDEMIA TYPE: ICD-10-CM

## 2018-06-28 LAB
ALBUMIN SERPL-MCNC: 4.1 G/DL (ref 3.5–5.2)
ALBUMIN/GLOB SERPL: 2 G/DL
ALP SERPL-CCNC: 61 U/L (ref 39–117)
ALT SERPL-CCNC: 17 U/L (ref 1–33)
AST SERPL-CCNC: 15 U/L (ref 1–32)
BILIRUB SERPL-MCNC: 0.6 MG/DL (ref 0.1–1.2)
BUN SERPL-MCNC: 31 MG/DL (ref 8–23)
BUN/CREAT SERPL: 47 (ref 7–25)
CALCIUM SERPL-MCNC: 10 MG/DL (ref 8.6–10.5)
CHLORIDE SERPL-SCNC: 106 MMOL/L (ref 98–107)
CHOLEST SERPL-MCNC: 123 MG/DL (ref 0–200)
CO2 SERPL-SCNC: 27 MMOL/L (ref 22–29)
CREAT SERPL-MCNC: 0.66 MG/DL (ref 0.57–1)
GLOBULIN SER CALC-MCNC: 2.1 GM/DL
GLUCOSE SERPL-MCNC: 89 MG/DL (ref 65–99)
HDLC SERPL-MCNC: 45 MG/DL (ref 40–60)
LDLC SERPL CALC-MCNC: 60 MG/DL (ref 0–100)
LDLC/HDLC SERPL: 1.32 {RATIO}
POTASSIUM SERPL-SCNC: 3.8 MMOL/L (ref 3.5–5.2)
PROT SERPL-MCNC: 6.2 G/DL (ref 6–8.5)
SODIUM SERPL-SCNC: 145 MMOL/L (ref 136–145)
TRIGL SERPL-MCNC: 92 MG/DL (ref 0–150)
TSH SERPL DL<=0.005 MIU/L-ACNC: 3.7 MIU/ML (ref 0.27–4.2)
VLDLC SERPL CALC-MCNC: 18.4 MG/DL (ref 5–40)

## 2018-07-05 ENCOUNTER — OFFICE VISIT (OUTPATIENT)
Dept: FAMILY MEDICINE CLINIC | Facility: CLINIC | Age: 78
End: 2018-07-05

## 2018-07-05 VITALS
BODY MASS INDEX: 26.59 KG/M2 | HEART RATE: 67 BPM | OXYGEN SATURATION: 96 % | SYSTOLIC BLOOD PRESSURE: 124 MMHG | DIASTOLIC BLOOD PRESSURE: 72 MMHG | TEMPERATURE: 97.7 F | WEIGHT: 159.6 LBS | HEIGHT: 65 IN

## 2018-07-05 DIAGNOSIS — E78.5 HYPERLIPIDEMIA, UNSPECIFIED HYPERLIPIDEMIA TYPE: Primary | ICD-10-CM

## 2018-07-05 DIAGNOSIS — F41.0 PANIC ATTACK: ICD-10-CM

## 2018-07-05 DIAGNOSIS — E03.9 ACQUIRED HYPOTHYROIDISM: ICD-10-CM

## 2018-07-05 DIAGNOSIS — I10 BENIGN ESSENTIAL HYPERTENSION: ICD-10-CM

## 2018-07-05 PROCEDURE — 99214 OFFICE O/P EST MOD 30 MIN: CPT | Performed by: INTERNAL MEDICINE

## 2018-07-05 RX ORDER — ALENDRONATE SODIUM 70 MG/1
70 TABLET ORAL
Qty: 12 TABLET | Refills: 3 | Status: SHIPPED | OUTPATIENT
Start: 2018-07-05 | End: 2019-06-06 | Stop reason: SDUPTHER

## 2018-07-05 RX ORDER — LEVOTHYROXINE SODIUM 50 MCG
50 TABLET ORAL DAILY
Qty: 90 TABLET | Refills: 3 | Status: SHIPPED | OUTPATIENT
Start: 2018-07-05 | End: 2018-11-10 | Stop reason: HOSPADM

## 2018-07-05 RX ORDER — PROPRANOLOL HYDROCHLORIDE 40 MG/1
40 TABLET ORAL 2 TIMES DAILY
Qty: 180 TABLET | Refills: 3 | Status: SHIPPED | OUTPATIENT
Start: 2018-07-05 | End: 2019-06-30 | Stop reason: SDUPTHER

## 2018-07-05 NOTE — PROGRESS NOTES
Subjective   Karolina Ornelas is a 78 y.o. female. Patient is here today for follow-up on her hypertension, hyperlipidemia, hypothyroidism and anxiety.  She is generally been doing well and is had no chest pain, shortness of breath, edema or myalgias.  He has no real new acute problems.  She does have some mild ankle edema that's not really bothersome.  She has some symptoms suggestive of right carpal tunnel syndrome and a trigger finger on her right hand that it's not at this point causing her any significant problems.  He does not wish to see hand specialist.   Chief Complaint   Patient presents with   • Hyperlipidemia     HTN, HYPOTHYROIDISM- FOLLOW UP LABS          Vitals:    07/05/18 1301   BP: 124/72   Pulse: 67   Temp: 97.7 °F (36.5 °C)   SpO2: 96%     The following portions of the patient's history were reviewed and updated as appropriate: allergies, current medications, past family history, past medical history, past social history, past surgical history and problem list.    Past Medical History:   Diagnosis Date   • Benign essential hypertension    • Hyperglycemia       Allergies   Allergen Reactions   • Cefaclor       Social History     Social History   • Marital status: Single     Spouse name: N/A   • Number of children: N/A   • Years of education: N/A     Occupational History   • Not on file.     Social History Main Topics   • Smoking status: Never Smoker   • Smokeless tobacco: Never Used   • Alcohol use No   • Drug use: Unknown   • Sexual activity: Not on file     Other Topics Concern   • Not on file     Social History Narrative   • No narrative on file        Current Outpatient Prescriptions:   •  alendronate (FOSAMAX) 70 MG tablet, Take 1 tablet by mouth Every 7 (Seven) Days., Disp: 12 tablet, Rfl: 3  •  atorvastatin (LIPITOR) 20 MG tablet, TAKE ONE TABLET BY MOUTH ONCE DAILY, Disp: 90 tablet, Rfl: 3  •  lisinopril-hydrochlorothiazide (PRINZIDE,ZESTORETIC) 20-12.5 MG per tablet, TAKE ONE TABLET BY MOUTH  ONCE DAILY, Disp: 90 tablet, Rfl: 3  •  PARoxetine (PAXIL) 10 MG tablet, TAKE ONE TABLET BY MOUTH IN THE MORNING, Disp: 90 tablet, Rfl: 3  •  propranolol (INDERAL) 40 MG tablet, Take 1 tablet by mouth 2 (Two) Times a Day., Disp: 180 tablet, Rfl: 3  •  SYNTHROID 50 MCG tablet, Take 1 tablet by mouth Daily. as directed, Disp: 90 tablet, Rfl: 3     Objective     History of Present Illness     Review of Systems   Constitutional: Negative.    HENT: Negative.    Eyes: Negative.    Respiratory: Negative.    Cardiovascular: Negative.    Gastrointestinal: Negative.    Endocrine: Negative.    Genitourinary: Negative.    Musculoskeletal: Negative.    Skin: Negative.    Neurological: Negative.    Psychiatric/Behavioral: Negative.        Physical Exam   Constitutional: She is oriented to person, place, and time. She appears well-developed and well-nourished.   Pleasant, cooperative no distress blood pressure 120/80   HENT:   Head: Normocephalic and atraumatic.   Eyes: Conjunctivae are normal. Pupils are equal, round, and reactive to light. No scleral icterus.   Neck: Normal range of motion. Neck supple. No thyromegaly present.   Cardiovascular: Normal rate, regular rhythm and normal heart sounds.    Pulmonary/Chest: Effort normal and breath sounds normal. No respiratory distress. She has no wheezes. She has no rales.   Musculoskeletal: Normal range of motion. She exhibits no edema.   Neurological: She is alert and oriented to person, place, and time.   Skin: Skin is warm and dry.   Psychiatric: She has a normal mood and affect. Her behavior is normal.   Nursing note and vitals reviewed.      ASSESSMENT  CMP is normal.  Lipid panel is under excellent control and TSH was quite normal.  #1-hypertension, adequately controlled on medication  #2-hyperlipidemia, excellent control on medication  #3-hypothyroidism, euthyroid on replacement  #4-anxiety controlled  #5-mild symptoms of right carpal tunnel syndrome with right trigger  finger     Problem List Items Addressed This Visit        Cardiovascular and Mediastinum    Benign essential hypertension    Relevant Medications    propranolol (INDERAL) 40 MG tablet    Hyperlipidemia - Primary       Endocrine    Acquired hypothyroidism    Relevant Medications    propranolol (INDERAL) 40 MG tablet    SYNTHROID 50 MCG tablet       Other    Panic attack          PLAN  Patient will continue current medicines as now on a plan on rechecking her in 4 months with a CBC, CMP, lipid panel, TSH and free T4 and vitamin D level    There are no Patient Instructions on file for this visit.  Return in about 4 months (around 11/5/2018) for with labs.

## 2018-07-30 RX ORDER — PAROXETINE 10 MG/1
TABLET, FILM COATED ORAL
Qty: 90 TABLET | Refills: 3 | Status: SHIPPED | OUTPATIENT
Start: 2018-07-30 | End: 2019-08-23 | Stop reason: SDUPTHER

## 2018-10-29 DIAGNOSIS — E55.9 VITAMIN D DEFICIENCY: ICD-10-CM

## 2018-10-29 DIAGNOSIS — Z13.29 SCREENING FOR THYROID DISORDER: ICD-10-CM

## 2018-10-29 DIAGNOSIS — Z79.899 LONG TERM USE OF DRUG: Primary | ICD-10-CM

## 2018-10-29 DIAGNOSIS — E78.5 HYPERLIPIDEMIA, UNSPECIFIED HYPERLIPIDEMIA TYPE: ICD-10-CM

## 2018-11-02 ENCOUNTER — OFFICE VISIT (OUTPATIENT)
Dept: FAMILY MEDICINE CLINIC | Facility: CLINIC | Age: 78
End: 2018-11-02

## 2018-11-02 VITALS
TEMPERATURE: 97.6 F | HEIGHT: 65 IN | DIASTOLIC BLOOD PRESSURE: 72 MMHG | HEART RATE: 70 BPM | WEIGHT: 162 LBS | BODY MASS INDEX: 26.99 KG/M2 | OXYGEN SATURATION: 98 % | SYSTOLIC BLOOD PRESSURE: 120 MMHG | RESPIRATION RATE: 16 BRPM

## 2018-11-02 DIAGNOSIS — R31.0 GROSS HEMATURIA: Primary | ICD-10-CM

## 2018-11-02 LAB
25(OH)D3+25(OH)D2 SERPL-MCNC: 38.7 NG/ML (ref 30–100)
ALBUMIN SERPL-MCNC: 4.2 G/DL (ref 3.5–5.2)
ALBUMIN/GLOB SERPL: 1.8 G/DL
ALP SERPL-CCNC: 70 U/L (ref 39–117)
ALT SERPL-CCNC: 16 U/L (ref 1–33)
AST SERPL-CCNC: 16 U/L (ref 1–32)
BASOPHILS # BLD AUTO: 0.02 10*3/MM3 (ref 0–0.2)
BASOPHILS NFR BLD AUTO: 0.3 % (ref 0–1.5)
BILIRUB BLD-MCNC: ABNORMAL MG/DL
BILIRUB SERPL-MCNC: 0.5 MG/DL (ref 0.1–1.2)
BUN SERPL-MCNC: 31 MG/DL (ref 8–23)
BUN/CREAT SERPL: 43.1 (ref 7–25)
CALCIUM SERPL-MCNC: 10.4 MG/DL (ref 8.6–10.5)
CHLORIDE SERPL-SCNC: 106 MMOL/L (ref 98–107)
CHOLEST SERPL-MCNC: 135 MG/DL (ref 0–200)
CLARITY, POC: ABNORMAL
CO2 SERPL-SCNC: 28.9 MMOL/L (ref 22–29)
COLOR UR: ABNORMAL
CREAT SERPL-MCNC: 0.72 MG/DL (ref 0.57–1)
EOSINOPHIL # BLD AUTO: 0.15 10*3/MM3 (ref 0–0.7)
EOSINOPHIL NFR BLD AUTO: 1.9 % (ref 0.3–6.2)
ERYTHROCYTE [DISTWIDTH] IN BLOOD BY AUTOMATED COUNT: 14.8 % (ref 11.7–13)
GLOBULIN SER CALC-MCNC: 2.3 GM/DL
GLUCOSE SERPL-MCNC: 91 MG/DL (ref 65–99)
GLUCOSE UR STRIP-MCNC: NEGATIVE MG/DL
HCT VFR BLD AUTO: 46.7 % (ref 35.6–45.5)
HDLC SERPL-MCNC: 47 MG/DL (ref 40–60)
HGB BLD-MCNC: 13.4 G/DL (ref 11.9–15.5)
IMM GRANULOCYTES # BLD: 0.04 10*3/MM3 (ref 0–0.03)
IMM GRANULOCYTES NFR BLD: 0.5 % (ref 0–0.5)
KETONES UR QL: ABNORMAL
LDLC SERPL CALC-MCNC: 71 MG/DL (ref 0–100)
LDLC/HDLC SERPL: 1.52 {RATIO}
LEUKOCYTE EST, POC: NEGATIVE
LYMPHOCYTES # BLD AUTO: 2.46 10*3/MM3 (ref 0.9–4.8)
LYMPHOCYTES NFR BLD AUTO: 30.9 % (ref 19.6–45.3)
MCH RBC QN AUTO: 28.5 PG (ref 26.9–32)
MCHC RBC AUTO-ENTMCNC: 28.7 G/DL (ref 32.4–36.3)
MCV RBC AUTO: 99.2 FL (ref 80.5–98.2)
MONOCYTES # BLD AUTO: 0.61 10*3/MM3 (ref 0.2–1.2)
MONOCYTES NFR BLD AUTO: 7.7 % (ref 5–12)
NEUTROPHILS # BLD AUTO: 4.68 10*3/MM3 (ref 1.9–8.1)
NEUTROPHILS NFR BLD AUTO: 58.7 % (ref 42.7–76)
NITRITE UR-MCNC: NEGATIVE MG/ML
PH UR: 5.5 [PH] (ref 5–8)
PLATELET # BLD AUTO: 164 10*3/MM3 (ref 140–500)
POTASSIUM SERPL-SCNC: 3.6 MMOL/L (ref 3.5–5.2)
PROT SERPL-MCNC: 6.5 G/DL (ref 6–8.5)
PROT UR STRIP-MCNC: ABNORMAL MG/DL
RBC # BLD AUTO: 4.71 10*6/MM3 (ref 3.9–5.2)
RBC # UR STRIP: ABNORMAL /UL
SODIUM SERPL-SCNC: 146 MMOL/L (ref 136–145)
SP GR UR: 1.03 (ref 1–1.03)
T4 FREE SERPL-MCNC: 1.66 NG/DL (ref 0.93–1.7)
TRIGL SERPL-MCNC: 83 MG/DL (ref 0–150)
TSH SERPL DL<=0.005 MIU/L-ACNC: 4.21 MIU/ML (ref 0.27–4.2)
UROBILINOGEN UR QL: NORMAL
VLDLC SERPL CALC-MCNC: 16.6 MG/DL (ref 5–40)
WBC # BLD AUTO: 7.96 10*3/MM3 (ref 4.5–10.7)

## 2018-11-02 PROCEDURE — 99213 OFFICE O/P EST LOW 20 MIN: CPT | Performed by: INTERNAL MEDICINE

## 2018-11-02 PROCEDURE — 81003 URINALYSIS AUTO W/O SCOPE: CPT | Performed by: INTERNAL MEDICINE

## 2018-11-02 RX ORDER — NITROFURANTOIN 25; 75 MG/1; MG/1
100 CAPSULE ORAL EVERY 12 HOURS SCHEDULED
Qty: 6 CAPSULE | Refills: 0 | Status: SHIPPED | OUTPATIENT
Start: 2018-11-02 | End: 2018-11-10 | Stop reason: HOSPADM

## 2018-11-02 NOTE — PROGRESS NOTES
Subjective   Karolina Ornelas is a 78 y.o. female. Patient is here today for some painless hematuria.  This morning it was noticeable and quite prominent in the toilet bowl.  Since then when she goes she's having no discomfort or dysuria but does see some pink on the toilet paper.  She's had no CVA tenderness and no increased frequency.  She's had no fevers or chills.  She did have a urinary tract infection and a good number of years ago that responded to what sounds like Macrodantin  Chief Complaint   Patient presents with   • Blood in Urine     x 1 day          Vitals:    11/02/18 1436   BP: 120/72   Pulse: 70   Resp: 16   Temp: 97.6 °F (36.4 °C)   SpO2: 98%     The following portions of the patient's history were reviewed and updated as appropriate: allergies, current medications, past family history, past medical history, past social history, past surgical history and problem list.    Past Medical History:   Diagnosis Date   • Benign essential hypertension    • Hyperglycemia       Allergies   Allergen Reactions   • Cefaclor       Social History     Social History   • Marital status: Single     Spouse name: N/A   • Number of children: N/A   • Years of education: N/A     Occupational History   • Not on file.     Social History Main Topics   • Smoking status: Never Smoker   • Smokeless tobacco: Never Used   • Alcohol use No   • Drug use: Unknown   • Sexual activity: Not on file     Other Topics Concern   • Not on file     Social History Narrative   • No narrative on file        Current Outpatient Prescriptions:   •  alendronate (FOSAMAX) 70 MG tablet, Take 1 tablet by mouth Every 7 (Seven) Days., Disp: 12 tablet, Rfl: 3  •  atorvastatin (LIPITOR) 20 MG tablet, TAKE ONE TABLET BY MOUTH ONCE DAILY, Disp: 90 tablet, Rfl: 3  •  lisinopril-hydrochlorothiazide (PRINZIDE,ZESTORETIC) 20-12.5 MG per tablet, TAKE ONE TABLET BY MOUTH ONCE DAILY, Disp: 90 tablet, Rfl: 3  •  nitrofurantoin, macrocrystal-monohydrate, (MACROBID) 100  MG capsule, Take 1 capsule by mouth Every 12 (Twelve) Hours., Disp: 6 capsule, Rfl: 0  •  PARoxetine (PAXIL) 10 MG tablet, TAKE ONE TABLET BY MOUTH ONCE DAILY IN THE MORNING, Disp: 90 tablet, Rfl: 3  •  propranolol (INDERAL) 40 MG tablet, Take 1 tablet by mouth 2 (Two) Times a Day., Disp: 180 tablet, Rfl: 3  •  SYNTHROID 50 MCG tablet, Take 1 tablet by mouth Daily. as directed, Disp: 90 tablet, Rfl: 3     Objective     History of Present Illness     Review of Systems   Constitutional: Negative.    HENT: Negative.    Respiratory: Negative.    Cardiovascular: Negative.    Gastrointestinal: Negative.    Genitourinary: Positive for hematuria.   Musculoskeletal: Negative.    Skin: Negative.    Neurological: Negative.    Psychiatric/Behavioral: Negative.        Physical Exam   Constitutional: She appears well-developed and well-nourished.   Pleasant, cooperative no distress   HENT:   Head: Normocephalic and atraumatic.   Eyes: Pupils are equal, round, and reactive to light. Conjunctivae are normal.   Cardiovascular: Normal rate, regular rhythm and normal heart sounds.    Pulmonary/Chest: Effort normal and breath sounds normal.   Abdominal:   No CVA tenderness   Neurological: She is alert.   Skin: Skin is warm and dry.   Psychiatric: She has a normal mood and affect. Her behavior is normal.   Nursing note and vitals reviewed.      ASSESSMENT  urinalysis was nitrite and leukocyte negative but shows a large amount of blood on dipstick  #1-hematuria, most likely related to some type of urinary tract infection early on.  There is no symptoms suggestive of a kidney stone.     Problem List Items Addressed This Visit        Genitourinary    Gross hematuria - Primary    Relevant Orders    Urine Culture - Urine, Urine, Clean Catch          PLAN I'm sending out a urine culture.   I'm going to start patient on Macrobid 100 mg twice a day for 3 days.  I encouraged her to drink plenty of fluids.  She's already scheduled for follow-up  to see me next week and we will need to get a repeat urinalysis then.    There are no Patient Instructions on file for this visit.  No Follow-up on file.

## 2018-11-04 LAB
BACTERIA UR CULT: NORMAL
BACTERIA UR CULT: NORMAL

## 2018-11-08 ENCOUNTER — HOSPITAL ENCOUNTER (INPATIENT)
Facility: HOSPITAL | Age: 78
LOS: 2 days | Discharge: HOME | End: 2018-11-10
Attending: EMERGENCY MEDICINE | Admitting: HOSPITALIST

## 2018-11-08 ENCOUNTER — APPOINTMENT (OUTPATIENT)
Dept: NEUROLOGY | Facility: HOSPITAL | Age: 78
End: 2018-11-08
Attending: HOSPITALIST

## 2018-11-08 ENCOUNTER — APPOINTMENT (OUTPATIENT)
Dept: CT IMAGING | Facility: HOSPITAL | Age: 78
End: 2018-11-08

## 2018-11-08 ENCOUNTER — APPOINTMENT (OUTPATIENT)
Dept: CARDIOLOGY | Facility: HOSPITAL | Age: 78
End: 2018-11-08
Attending: HOSPITALIST

## 2018-11-08 ENCOUNTER — APPOINTMENT (OUTPATIENT)
Dept: MRI IMAGING | Facility: HOSPITAL | Age: 78
End: 2018-11-08

## 2018-11-08 DIAGNOSIS — R29.898 LEFT ARM WEAKNESS: ICD-10-CM

## 2018-11-08 DIAGNOSIS — R26.2 DIFFICULTY WALKING: ICD-10-CM

## 2018-11-08 DIAGNOSIS — R56.9 SEIZURE (HCC): ICD-10-CM

## 2018-11-08 DIAGNOSIS — G45.9 TIA (TRANSIENT ISCHEMIC ATTACK): Primary | ICD-10-CM

## 2018-11-08 DIAGNOSIS — R93.0 ABNORMAL CT OF THE HEAD: ICD-10-CM

## 2018-11-08 DIAGNOSIS — D32.0 MENINGIOMA, CEREBRAL (HCC): ICD-10-CM

## 2018-11-08 DIAGNOSIS — R29.810 FACIAL DROOP: ICD-10-CM

## 2018-11-08 PROBLEM — E87.6 HYPOKALEMIA: Status: ACTIVE | Noted: 2018-11-08

## 2018-11-08 LAB
ALBUMIN SERPL-MCNC: 3.6 G/DL (ref 3.5–5.2)
ALBUMIN/GLOB SERPL: 1.2 G/DL
ALP SERPL-CCNC: 78 U/L (ref 39–117)
ALT SERPL W P-5'-P-CCNC: 20 U/L (ref 1–33)
ANION GAP SERPL CALCULATED.3IONS-SCNC: 12.1 MMOL/L
APTT PPP: 25.9 SECONDS (ref 22.7–35.4)
AST SERPL-CCNC: 18 U/L (ref 1–32)
BASOPHILS # BLD AUTO: 0.02 10*3/MM3 (ref 0–0.2)
BASOPHILS NFR BLD AUTO: 0.2 % (ref 0–1.5)
BH CV ECHO MEAS - ACS: 1.7 CM
BH CV ECHO MEAS - AO MAX PG (FULL): 4 MMHG
BH CV ECHO MEAS - AO MAX PG: 9.1 MMHG
BH CV ECHO MEAS - AO MEAN PG (FULL): 2 MMHG
BH CV ECHO MEAS - AO MEAN PG: 5 MMHG
BH CV ECHO MEAS - AO ROOT AREA (BSA CORRECTED): 1.3
BH CV ECHO MEAS - AO ROOT AREA: 4.5 CM^2
BH CV ECHO MEAS - AO ROOT DIAM: 2.4 CM
BH CV ECHO MEAS - AO V2 MAX: 151 CM/SEC
BH CV ECHO MEAS - AO V2 MEAN: 103 CM/SEC
BH CV ECHO MEAS - AO V2 VTI: 32.3 CM
BH CV ECHO MEAS - AVA(I,A): 2.1 CM^2
BH CV ECHO MEAS - AVA(I,D): 2.1 CM^2
BH CV ECHO MEAS - AVA(V,A): 2.1 CM^2
BH CV ECHO MEAS - AVA(V,D): 2.1 CM^2
BH CV ECHO MEAS - BSA(HAYCOCK): 1.8 M^2
BH CV ECHO MEAS - BSA: 1.8 M^2
BH CV ECHO MEAS - BZI_BMI: 24.6 KILOGRAMS/M^2
BH CV ECHO MEAS - BZI_METRIC_HEIGHT: 170.2 CM
BH CV ECHO MEAS - BZI_METRIC_WEIGHT: 71.2 KG
BH CV ECHO MEAS - EDV(CUBED): 91.1 ML
BH CV ECHO MEAS - EDV(MOD-SP2): 55 ML
BH CV ECHO MEAS - EDV(MOD-SP4): 44 ML
BH CV ECHO MEAS - EDV(TEICH): 92.4 ML
BH CV ECHO MEAS - EF(CUBED): 88.3 %
BH CV ECHO MEAS - EF(MOD-BP): 56 %
BH CV ECHO MEAS - EF(MOD-SP2): 60 %
BH CV ECHO MEAS - EF(MOD-SP4): 50 %
BH CV ECHO MEAS - EF(TEICH): 82.5 %
BH CV ECHO MEAS - ESV(CUBED): 10.6 ML
BH CV ECHO MEAS - ESV(MOD-SP2): 22 ML
BH CV ECHO MEAS - ESV(MOD-SP4): 22 ML
BH CV ECHO MEAS - ESV(TEICH): 16.2 ML
BH CV ECHO MEAS - FS: 51.1 %
BH CV ECHO MEAS - IVS/LVPW: 1.2
BH CV ECHO MEAS - IVSD: 1.2 CM
BH CV ECHO MEAS - LAT PEAK E' VEL: 5 CM/SEC
BH CV ECHO MEAS - LV DIASTOLIC VOL/BSA (35-75): 24.1 ML/M^2
BH CV ECHO MEAS - LV MASS(C)D: 175 GRAMS
BH CV ECHO MEAS - LV MASS(C)DI: 95.9 GRAMS/M^2
BH CV ECHO MEAS - LV MAX PG: 5.1 MMHG
BH CV ECHO MEAS - LV MEAN PG: 3 MMHG
BH CV ECHO MEAS - LV SYSTOLIC VOL/BSA (12-30): 12.1 ML/M^2
BH CV ECHO MEAS - LV V1 MAX: 113 CM/SEC
BH CV ECHO MEAS - LV V1 MEAN: 73.3 CM/SEC
BH CV ECHO MEAS - LV V1 VTI: 23.4 CM
BH CV ECHO MEAS - LVIDD: 4.5 CM
BH CV ECHO MEAS - LVIDS: 2.2 CM
BH CV ECHO MEAS - LVLD AP2: 6 CM
BH CV ECHO MEAS - LVLD AP4: 5.7 CM
BH CV ECHO MEAS - LVLS AP2: 5.7 CM
BH CV ECHO MEAS - LVLS AP4: 5.4 CM
BH CV ECHO MEAS - LVOT AREA (M): 2.8 CM^2
BH CV ECHO MEAS - LVOT AREA: 2.8 CM^2
BH CV ECHO MEAS - LVOT DIAM: 1.9 CM
BH CV ECHO MEAS - LVPWD: 1 CM
BH CV ECHO MEAS - MED PEAK E' VEL: 7 CM/SEC
BH CV ECHO MEAS - MV A DUR: 0.1 SEC
BH CV ECHO MEAS - MV A MAX VEL: 107 CM/SEC
BH CV ECHO MEAS - MV DEC SLOPE: 417 CM/SEC^2
BH CV ECHO MEAS - MV DEC TIME: 0.28 SEC
BH CV ECHO MEAS - MV E MAX VEL: 54.3 CM/SEC
BH CV ECHO MEAS - MV E/A: 0.51
BH CV ECHO MEAS - MV MAX PG: 5.5 MMHG
BH CV ECHO MEAS - MV MEAN PG: 2 MMHG
BH CV ECHO MEAS - MV P1/2T MAX VEL: 98.6 CM/SEC
BH CV ECHO MEAS - MV P1/2T: 69.3 MSEC
BH CV ECHO MEAS - MV V2 MAX: 117 CM/SEC
BH CV ECHO MEAS - MV V2 MEAN: 69.5 CM/SEC
BH CV ECHO MEAS - MV V2 VTI: 27.5 CM
BH CV ECHO MEAS - MVA P1/2T LCG: 2.2 CM^2
BH CV ECHO MEAS - MVA(P1/2T): 3.2 CM^2
BH CV ECHO MEAS - MVA(VTI): 2.4 CM^2
BH CV ECHO MEAS - PA ACC TIME: 0.1 SEC
BH CV ECHO MEAS - PA MAX PG (FULL): 1.3 MMHG
BH CV ECHO MEAS - PA MAX PG: 4.2 MMHG
BH CV ECHO MEAS - PA PR(ACCEL): 34.5 MMHG
BH CV ECHO MEAS - PA V2 MAX: 102 CM/SEC
BH CV ECHO MEAS - PI END-D VEL: 118 CM/SEC
BH CV ECHO MEAS - PULM A REVS DUR: 0.12 SEC
BH CV ECHO MEAS - PULM A REVS VEL: 27.2 CM/SEC
BH CV ECHO MEAS - PULM DIAS VEL: 25.7 CM/SEC
BH CV ECHO MEAS - PULM S/D: 1.5
BH CV ECHO MEAS - PULM SYS VEL: 39.3 CM/SEC
BH CV ECHO MEAS - PVA(V,A): 3.7 CM^2
BH CV ECHO MEAS - PVA(V,D): 3.7 CM^2
BH CV ECHO MEAS - QP/QS: 1.3
BH CV ECHO MEAS - RAP SYSTOLE: 3 MMHG
BH CV ECHO MEAS - RV MAX PG: 2.8 MMHG
BH CV ECHO MEAS - RV MEAN PG: 2 MMHG
BH CV ECHO MEAS - RV V1 MAX: 84.2 CM/SEC
BH CV ECHO MEAS - RV V1 MEAN: 57.5 CM/SEC
BH CV ECHO MEAS - RV V1 VTI: 18.4 CM
BH CV ECHO MEAS - RVOT AREA: 4.5 CM^2
BH CV ECHO MEAS - RVOT DIAM: 2.4 CM
BH CV ECHO MEAS - RVSP: 29 MMHG
BH CV ECHO MEAS - SI(AO): 80.1 ML/M^2
BH CV ECHO MEAS - SI(CUBED): 44.1 ML/M^2
BH CV ECHO MEAS - SI(LVOT): 36.4 ML/M^2
BH CV ECHO MEAS - SI(MOD-SP2): 18.1 ML/M^2
BH CV ECHO MEAS - SI(MOD-SP4): 12.1 ML/M^2
BH CV ECHO MEAS - SI(TEICH): 41.8 ML/M^2
BH CV ECHO MEAS - SV(AO): 146.1 ML
BH CV ECHO MEAS - SV(CUBED): 80.5 ML
BH CV ECHO MEAS - SV(LVOT): 66.3 ML
BH CV ECHO MEAS - SV(MOD-SP2): 33 ML
BH CV ECHO MEAS - SV(MOD-SP4): 22 ML
BH CV ECHO MEAS - SV(RVOT): 83.2 ML
BH CV ECHO MEAS - SV(TEICH): 76.2 ML
BH CV ECHO MEAS - TAPSE (>1.6): 2 CM2
BH CV ECHO MEAS - TR MAX VEL: 271 CM/SEC
BH CV ECHO MEASUREMENTS AVERAGE E/E' RATIO: 9.05
BH CV VAS BP RIGHT ARM: NORMAL MMHG
BH CV XLRA - RV BASE: 3.6 CM
BH CV XLRA - TDI S': 16 CM/SEC
BILIRUB SERPL-MCNC: 0.5 MG/DL (ref 0.1–1.2)
BUN BLD-MCNC: 21 MG/DL (ref 8–23)
BUN/CREAT SERPL: 33.9 (ref 7–25)
CALCIUM SPEC-SCNC: 10.2 MG/DL (ref 8.6–10.5)
CHLORIDE SERPL-SCNC: 108 MMOL/L (ref 98–107)
CO2 SERPL-SCNC: 25.9 MMOL/L (ref 22–29)
CREAT BLD-MCNC: 0.62 MG/DL (ref 0.57–1)
DEPRECATED RDW RBC AUTO: 47.4 FL (ref 37–54)
EOSINOPHIL # BLD AUTO: 0.22 10*3/MM3 (ref 0–0.7)
EOSINOPHIL NFR BLD AUTO: 2.4 % (ref 0.3–6.2)
ERYTHROCYTE [DISTWIDTH] IN BLOOD BY AUTOMATED COUNT: 13.8 % (ref 11.7–13)
GFR SERPL CREATININE-BSD FRML MDRD: 93 ML/MIN/1.73
GLOBULIN UR ELPH-MCNC: 3.1 GM/DL
GLUCOSE BLD-MCNC: 114 MG/DL (ref 65–99)
GLUCOSE BLDC GLUCOMTR-MCNC: 106 MG/DL (ref 70–130)
GLUCOSE BLDC GLUCOMTR-MCNC: 169 MG/DL (ref 70–130)
GLUCOSE BLDC GLUCOMTR-MCNC: 89 MG/DL (ref 70–130)
HCT VFR BLD AUTO: 44.1 % (ref 35.6–45.5)
HGB BLD-MCNC: 13.6 G/DL (ref 11.9–15.5)
IMM GRANULOCYTES # BLD: 0.04 10*3/MM3 (ref 0–0.03)
IMM GRANULOCYTES NFR BLD: 0.4 % (ref 0–0.5)
INR PPP: 0.96 (ref 0.9–1.1)
LEFT ATRIUM VOLUME INDEX: 27 ML/M2
LV EF 2D ECHO EST: 56 %
LYMPHOCYTES # BLD AUTO: 2.65 10*3/MM3 (ref 0.9–4.8)
LYMPHOCYTES NFR BLD AUTO: 29.1 % (ref 19.6–45.3)
MAXIMAL PREDICTED HEART RATE: 140 BPM
MCH RBC QN AUTO: 28.9 PG (ref 26.9–32)
MCHC RBC AUTO-ENTMCNC: 30.8 G/DL (ref 32.4–36.3)
MCV RBC AUTO: 93.8 FL (ref 80.5–98.2)
MONOCYTES # BLD AUTO: 0.69 10*3/MM3 (ref 0.2–1.2)
MONOCYTES NFR BLD AUTO: 7.6 % (ref 5–12)
NEUTROPHILS # BLD AUTO: 5.48 10*3/MM3 (ref 1.9–8.1)
NEUTROPHILS NFR BLD AUTO: 60.3 % (ref 42.7–76)
PLATELET # BLD AUTO: 163 10*3/MM3 (ref 140–500)
PMV BLD AUTO: 12.7 FL (ref 6–12)
POTASSIUM BLD-SCNC: 3.4 MMOL/L (ref 3.5–5.2)
PROT SERPL-MCNC: 6.7 G/DL (ref 6–8.5)
PROTHROMBIN TIME: 12.6 SECONDS (ref 11.7–14.2)
RBC # BLD AUTO: 4.7 10*6/MM3 (ref 3.9–5.2)
SODIUM BLD-SCNC: 146 MMOL/L (ref 136–145)
STRESS TARGET HR: 119 BPM
TROPONIN T SERPL-MCNC: <0.01 NG/ML (ref 0–0.03)
TSH SERPL DL<=0.05 MIU/L-ACNC: 9.69 MIU/ML (ref 0.27–4.2)
WBC NRBC COR # BLD: 9.1 10*3/MM3 (ref 4.5–10.7)

## 2018-11-08 PROCEDURE — 82962 GLUCOSE BLOOD TEST: CPT

## 2018-11-08 PROCEDURE — 99285 EMERGENCY DEPT VISIT HI MDM: CPT

## 2018-11-08 PROCEDURE — A9577 INJ MULTIHANCE: HCPCS | Performed by: HOSPITALIST

## 2018-11-08 PROCEDURE — 84484 ASSAY OF TROPONIN QUANT: CPT | Performed by: EMERGENCY MEDICINE

## 2018-11-08 PROCEDURE — 97110 THERAPEUTIC EXERCISES: CPT

## 2018-11-08 PROCEDURE — 92610 EVALUATE SWALLOWING FUNCTION: CPT | Performed by: SPEECH-LANGUAGE PATHOLOGIST

## 2018-11-08 PROCEDURE — 93005 ELECTROCARDIOGRAM TRACING: CPT | Performed by: EMERGENCY MEDICINE

## 2018-11-08 PROCEDURE — 85025 COMPLETE CBC W/AUTO DIFF WBC: CPT | Performed by: EMERGENCY MEDICINE

## 2018-11-08 PROCEDURE — 97162 PT EVAL MOD COMPLEX 30 MIN: CPT

## 2018-11-08 PROCEDURE — 99222 1ST HOSP IP/OBS MODERATE 55: CPT | Performed by: PSYCHIATRY & NEUROLOGY

## 2018-11-08 PROCEDURE — 93306 TTE W/DOPPLER COMPLETE: CPT | Performed by: INTERNAL MEDICINE

## 2018-11-08 PROCEDURE — 85610 PROTHROMBIN TIME: CPT | Performed by: EMERGENCY MEDICINE

## 2018-11-08 PROCEDURE — 84443 ASSAY THYROID STIM HORMONE: CPT | Performed by: HOSPITALIST

## 2018-11-08 PROCEDURE — 95816 EEG AWAKE AND DROWSY: CPT | Performed by: PSYCHIATRY & NEUROLOGY

## 2018-11-08 PROCEDURE — 85730 THROMBOPLASTIN TIME PARTIAL: CPT | Performed by: EMERGENCY MEDICINE

## 2018-11-08 PROCEDURE — 95816 EEG AWAKE AND DROWSY: CPT

## 2018-11-08 PROCEDURE — 70450 CT HEAD/BRAIN W/O DYE: CPT

## 2018-11-08 PROCEDURE — 93306 TTE W/DOPPLER COMPLETE: CPT

## 2018-11-08 PROCEDURE — 70553 MRI BRAIN STEM W/O & W/DYE: CPT

## 2018-11-08 PROCEDURE — 93010 ELECTROCARDIOGRAM REPORT: CPT | Performed by: INTERNAL MEDICINE

## 2018-11-08 PROCEDURE — 80053 COMPREHEN METABOLIC PANEL: CPT | Performed by: EMERGENCY MEDICINE

## 2018-11-08 PROCEDURE — 0 GADOBENATE DIMEGLUMINE 529 MG/ML SOLUTION: Performed by: HOSPITALIST

## 2018-11-08 RX ORDER — POTASSIUM CHLORIDE 750 MG/1
40 CAPSULE, EXTENDED RELEASE ORAL ONCE
Status: COMPLETED | OUTPATIENT
Start: 2018-11-08 | End: 2018-11-08

## 2018-11-08 RX ORDER — ACETAMINOPHEN 325 MG/1
650 TABLET ORAL EVERY 4 HOURS PRN
Status: DISCONTINUED | OUTPATIENT
Start: 2018-11-08 | End: 2018-11-10 | Stop reason: HOSPADM

## 2018-11-08 RX ORDER — ATORVASTATIN CALCIUM 80 MG/1
80 TABLET, FILM COATED ORAL NIGHTLY
Status: DISCONTINUED | OUTPATIENT
Start: 2018-11-08 | End: 2018-11-10 | Stop reason: HOSPADM

## 2018-11-08 RX ORDER — LEVOTHYROXINE SODIUM 0.05 MG/1
50 TABLET ORAL
Status: DISCONTINUED | OUTPATIENT
Start: 2018-11-09 | End: 2018-11-09

## 2018-11-08 RX ORDER — PROPRANOLOL HYDROCHLORIDE 40 MG/1
40 TABLET ORAL 2 TIMES DAILY
Status: DISCONTINUED | OUTPATIENT
Start: 2018-11-08 | End: 2018-11-10 | Stop reason: HOSPADM

## 2018-11-08 RX ORDER — ONDANSETRON 2 MG/ML
4 INJECTION INTRAMUSCULAR; INTRAVENOUS EVERY 6 HOURS PRN
Status: DISCONTINUED | OUTPATIENT
Start: 2018-11-08 | End: 2018-11-10 | Stop reason: HOSPADM

## 2018-11-08 RX ORDER — ASPIRIN 325 MG
325 TABLET, DELAYED RELEASE (ENTERIC COATED) ORAL DAILY
Status: DISCONTINUED | OUTPATIENT
Start: 2018-11-08 | End: 2018-11-10 | Stop reason: HOSPADM

## 2018-11-08 RX ORDER — SODIUM CHLORIDE 0.9 % (FLUSH) 0.9 %
10 SYRINGE (ML) INJECTION AS NEEDED
Status: DISCONTINUED | OUTPATIENT
Start: 2018-11-08 | End: 2018-11-10 | Stop reason: HOSPADM

## 2018-11-08 RX ORDER — PAROXETINE 10 MG/1
10 TABLET, FILM COATED ORAL DAILY
Status: DISCONTINUED | OUTPATIENT
Start: 2018-11-08 | End: 2018-11-10 | Stop reason: HOSPADM

## 2018-11-08 RX ORDER — SODIUM CHLORIDE 0.9 % (FLUSH) 0.9 %
3 SYRINGE (ML) INJECTION EVERY 12 HOURS SCHEDULED
Status: DISCONTINUED | OUTPATIENT
Start: 2018-11-08 | End: 2018-11-10 | Stop reason: HOSPADM

## 2018-11-08 RX ORDER — SODIUM CHLORIDE 0.9 % (FLUSH) 0.9 %
3-10 SYRINGE (ML) INJECTION AS NEEDED
Status: DISCONTINUED | OUTPATIENT
Start: 2018-11-08 | End: 2018-11-10 | Stop reason: HOSPADM

## 2018-11-08 RX ORDER — SODIUM CHLORIDE 9 MG/ML
100 INJECTION, SOLUTION INTRAVENOUS CONTINUOUS
Status: DISCONTINUED | OUTPATIENT
Start: 2018-11-08 | End: 2018-11-09

## 2018-11-08 RX ORDER — ACETAMINOPHEN 325 MG/1
650 TABLET ORAL EVERY 6 HOURS PRN
Status: DISCONTINUED | OUTPATIENT
Start: 2018-11-08 | End: 2018-11-10 | Stop reason: HOSPADM

## 2018-11-08 RX ADMIN — POTASSIUM CHLORIDE 40 MEQ: 750 CAPSULE, EXTENDED RELEASE ORAL at 11:25

## 2018-11-08 RX ADMIN — Medication 3 ML: at 21:07

## 2018-11-08 RX ADMIN — PAROXETINE HYDROCHLORIDE 10 MG: 10 TABLET, FILM COATED ORAL at 16:07

## 2018-11-08 RX ADMIN — PROPRANOLOL HYDROCHLORIDE 40 MG: 40 TABLET ORAL at 20:50

## 2018-11-08 RX ADMIN — SODIUM CHLORIDE 100 ML/HR: 9 INJECTION, SOLUTION INTRAVENOUS at 13:35

## 2018-11-08 RX ADMIN — GADOBENATE DIMEGLUMINE 14 ML: 529 INJECTION, SOLUTION INTRAVENOUS at 18:07

## 2018-11-08 RX ADMIN — ATORVASTATIN CALCIUM 80 MG: 80 TABLET, FILM COATED ORAL at 20:49

## 2018-11-08 RX ADMIN — LISINOPRIL: 20 TABLET ORAL at 16:07

## 2018-11-08 RX ADMIN — ASPIRIN 325 MG: 325 TABLET, DELAYED RELEASE ORAL at 11:25

## 2018-11-09 PROBLEM — R56.9 SEIZURE (HCC): Status: ACTIVE | Noted: 2018-11-09

## 2018-11-09 PROBLEM — D32.0 MENINGIOMA, CEREBRAL (HCC): Status: ACTIVE | Noted: 2018-11-09

## 2018-11-09 LAB
ALBUMIN SERPL-MCNC: 3.4 G/DL (ref 3.5–5.2)
ALBUMIN/GLOB SERPL: 1.3 G/DL
ALP SERPL-CCNC: 59 U/L (ref 39–117)
ALT SERPL W P-5'-P-CCNC: 15 U/L (ref 1–33)
ANION GAP SERPL CALCULATED.3IONS-SCNC: 11 MMOL/L
AST SERPL-CCNC: 16 U/L (ref 1–32)
BACTERIA UR QL AUTO: ABNORMAL /HPF
BASOPHILS # BLD AUTO: 0.02 10*3/MM3 (ref 0–0.2)
BASOPHILS NFR BLD AUTO: 0.2 % (ref 0–1.5)
BILIRUB SERPL-MCNC: 0.7 MG/DL (ref 0.1–1.2)
BILIRUB UR QL STRIP: NEGATIVE
BUN BLD-MCNC: 13 MG/DL (ref 8–23)
BUN/CREAT SERPL: 24.5 (ref 7–25)
CALCIUM SPEC-SCNC: 9.2 MG/DL (ref 8.6–10.5)
CHLORIDE SERPL-SCNC: 110 MMOL/L (ref 98–107)
CHOLEST SERPL-MCNC: 117 MG/DL (ref 0–200)
CLARITY UR: CLEAR
CO2 SERPL-SCNC: 23 MMOL/L (ref 22–29)
COLOR UR: YELLOW
CREAT BLD-MCNC: 0.53 MG/DL (ref 0.57–1)
DEPRECATED RDW RBC AUTO: 47.9 FL (ref 37–54)
EOSINOPHIL # BLD AUTO: 0.08 10*3/MM3 (ref 0–0.7)
EOSINOPHIL NFR BLD AUTO: 1 % (ref 0.3–6.2)
ERYTHROCYTE [DISTWIDTH] IN BLOOD BY AUTOMATED COUNT: 13.9 % (ref 11.7–13)
GFR SERPL CREATININE-BSD FRML MDRD: 111 ML/MIN/1.73
GLOBULIN UR ELPH-MCNC: 2.7 GM/DL
GLUCOSE BLD-MCNC: 109 MG/DL (ref 65–99)
GLUCOSE UR STRIP-MCNC: NEGATIVE MG/DL
HBA1C MFR BLD: 5.2 % (ref 4.8–5.6)
HCT VFR BLD AUTO: 40.4 % (ref 35.6–45.5)
HDLC SERPL-MCNC: 44 MG/DL (ref 40–60)
HGB BLD-MCNC: 12.4 G/DL (ref 11.9–15.5)
HGB UR QL STRIP.AUTO: NEGATIVE
HYALINE CASTS UR QL AUTO: ABNORMAL /LPF
IMM GRANULOCYTES # BLD: 0.03 10*3/MM3 (ref 0–0.03)
IMM GRANULOCYTES NFR BLD: 0.4 % (ref 0–0.5)
KETONES UR QL STRIP: ABNORMAL
LDLC SERPL CALC-MCNC: 59 MG/DL (ref 0–100)
LDLC/HDLC SERPL: 1.34 {RATIO}
LEUKOCYTE ESTERASE UR QL STRIP.AUTO: ABNORMAL
LYMPHOCYTES # BLD AUTO: 2.27 10*3/MM3 (ref 0.9–4.8)
LYMPHOCYTES NFR BLD AUTO: 27.3 % (ref 19.6–45.3)
MAGNESIUM SERPL-MCNC: 2.3 MG/DL (ref 1.6–2.4)
MCH RBC QN AUTO: 28.8 PG (ref 26.9–32)
MCHC RBC AUTO-ENTMCNC: 30.7 G/DL (ref 32.4–36.3)
MCV RBC AUTO: 94 FL (ref 80.5–98.2)
MONOCYTES # BLD AUTO: 0.54 10*3/MM3 (ref 0.2–1.2)
MONOCYTES NFR BLD AUTO: 6.5 % (ref 5–12)
NEUTROPHILS # BLD AUTO: 5.39 10*3/MM3 (ref 1.9–8.1)
NEUTROPHILS NFR BLD AUTO: 64.6 % (ref 42.7–76)
NITRITE UR QL STRIP: NEGATIVE
PH UR STRIP.AUTO: 7 [PH] (ref 5–8)
PLATELET # BLD AUTO: 142 10*3/MM3 (ref 140–500)
PMV BLD AUTO: 12.3 FL (ref 6–12)
POTASSIUM BLD-SCNC: 3.3 MMOL/L (ref 3.5–5.2)
PROT SERPL-MCNC: 6.1 G/DL (ref 6–8.5)
PROT UR QL STRIP: NEGATIVE
RBC # BLD AUTO: 4.3 10*6/MM3 (ref 3.9–5.2)
RBC # UR: ABNORMAL /HPF
REF LAB TEST METHOD: ABNORMAL
SODIUM BLD-SCNC: 144 MMOL/L (ref 136–145)
SP GR UR STRIP: 1.01 (ref 1–1.03)
SQUAMOUS #/AREA URNS HPF: ABNORMAL /HPF
TRIGL SERPL-MCNC: 71 MG/DL (ref 0–150)
UROBILINOGEN UR QL STRIP: ABNORMAL
VLDLC SERPL-MCNC: 14.2 MG/DL (ref 5–40)
WBC NRBC COR # BLD: 8.33 10*3/MM3 (ref 4.5–10.7)
WBC UR QL AUTO: ABNORMAL /HPF

## 2018-11-09 PROCEDURE — 80053 COMPREHEN METABOLIC PANEL: CPT | Performed by: HOSPITALIST

## 2018-11-09 PROCEDURE — 81001 URINALYSIS AUTO W/SCOPE: CPT | Performed by: HOSPITALIST

## 2018-11-09 PROCEDURE — 85025 COMPLETE CBC W/AUTO DIFF WBC: CPT | Performed by: HOSPITALIST

## 2018-11-09 PROCEDURE — 99232 SBSQ HOSP IP/OBS MODERATE 35: CPT | Performed by: PSYCHIATRY & NEUROLOGY

## 2018-11-09 PROCEDURE — 97535 SELF CARE MNGMENT TRAINING: CPT

## 2018-11-09 PROCEDURE — 97110 THERAPEUTIC EXERCISES: CPT

## 2018-11-09 PROCEDURE — 83735 ASSAY OF MAGNESIUM: CPT | Performed by: HOSPITALIST

## 2018-11-09 PROCEDURE — 80061 LIPID PANEL: CPT | Performed by: HOSPITALIST

## 2018-11-09 PROCEDURE — 97165 OT EVAL LOW COMPLEX 30 MIN: CPT

## 2018-11-09 PROCEDURE — 99222 1ST HOSP IP/OBS MODERATE 55: CPT | Performed by: NURSE PRACTITIONER

## 2018-11-09 PROCEDURE — 25010000002 DEXAMETHASONE PER 1 MG: Performed by: NURSE PRACTITIONER

## 2018-11-09 PROCEDURE — 83036 HEMOGLOBIN GLYCOSYLATED A1C: CPT | Performed by: HOSPITALIST

## 2018-11-09 RX ORDER — POTASSIUM CHLORIDE 1.5 G/1.77G
40 POWDER, FOR SOLUTION ORAL AS NEEDED
Status: DISCONTINUED | OUTPATIENT
Start: 2018-11-09 | End: 2018-11-10 | Stop reason: HOSPADM

## 2018-11-09 RX ORDER — FAMOTIDINE 20 MG/1
20 TABLET, FILM COATED ORAL DAILY
Status: DISCONTINUED | OUTPATIENT
Start: 2018-11-09 | End: 2018-11-10 | Stop reason: HOSPADM

## 2018-11-09 RX ORDER — LEVOTHYROXINE SODIUM 0.07 MG/1
75 TABLET ORAL
Status: DISCONTINUED | OUTPATIENT
Start: 2018-11-10 | End: 2018-11-10 | Stop reason: HOSPADM

## 2018-11-09 RX ORDER — DEXAMETHASONE 2 MG/1
2 TABLET ORAL
Qty: 60 TABLET | Refills: 1 | Status: SHIPPED | OUTPATIENT
Start: 2018-11-09 | End: 2019-01-03

## 2018-11-09 RX ORDER — LEVETIRACETAM 500 MG/1
500 TABLET ORAL 2 TIMES DAILY
Status: DISCONTINUED | OUTPATIENT
Start: 2018-11-09 | End: 2018-11-10 | Stop reason: HOSPADM

## 2018-11-09 RX ORDER — FAMOTIDINE 20 MG/1
20 TABLET, FILM COATED ORAL DAILY
Qty: 30 TABLET | Refills: 0 | Status: SHIPPED | OUTPATIENT
Start: 2018-11-09 | End: 2018-11-10 | Stop reason: HOSPADM

## 2018-11-09 RX ORDER — DEXAMETHASONE SODIUM PHOSPHATE 4 MG/ML
4 INJECTION, SOLUTION INTRA-ARTICULAR; INTRALESIONAL; INTRAMUSCULAR; INTRAVENOUS; SOFT TISSUE EVERY 6 HOURS
Status: DISCONTINUED | OUTPATIENT
Start: 2018-11-09 | End: 2018-11-10 | Stop reason: HOSPADM

## 2018-11-09 RX ORDER — POTASSIUM CHLORIDE 750 MG/1
40 CAPSULE, EXTENDED RELEASE ORAL AS NEEDED
Status: DISCONTINUED | OUTPATIENT
Start: 2018-11-09 | End: 2018-11-10 | Stop reason: HOSPADM

## 2018-11-09 RX ADMIN — PAROXETINE HYDROCHLORIDE 10 MG: 10 TABLET, FILM COATED ORAL at 08:07

## 2018-11-09 RX ADMIN — Medication 3 ML: at 21:22

## 2018-11-09 RX ADMIN — LISINOPRIL: 20 TABLET ORAL at 08:07

## 2018-11-09 RX ADMIN — POTASSIUM CHLORIDE 40 MEQ: 750 CAPSULE, EXTENDED RELEASE ORAL at 18:28

## 2018-11-09 RX ADMIN — ASPIRIN 325 MG: 325 TABLET, DELAYED RELEASE ORAL at 08:06

## 2018-11-09 RX ADMIN — PROPRANOLOL HYDROCHLORIDE 40 MG: 40 TABLET ORAL at 21:21

## 2018-11-09 RX ADMIN — PROPRANOLOL HYDROCHLORIDE 40 MG: 40 TABLET ORAL at 08:09

## 2018-11-09 RX ADMIN — FAMOTIDINE 20 MG: 20 TABLET, FILM COATED ORAL at 16:57

## 2018-11-09 RX ADMIN — LEVETIRACETAM 500 MG: 500 TABLET, FILM COATED ORAL at 15:00

## 2018-11-09 RX ADMIN — DEXAMETHASONE SODIUM PHOSPHATE 4 MG: 4 INJECTION, SOLUTION INTRAMUSCULAR; INTRAVENOUS at 21:20

## 2018-11-09 RX ADMIN — LEVOTHYROXINE SODIUM 50 MCG: 50 TABLET ORAL at 06:05

## 2018-11-09 RX ADMIN — DEXAMETHASONE SODIUM PHOSPHATE 4 MG: 4 INJECTION, SOLUTION INTRAMUSCULAR; INTRAVENOUS at 15:01

## 2018-11-09 RX ADMIN — LEVETIRACETAM 500 MG: 500 TABLET, FILM COATED ORAL at 21:21

## 2018-11-09 RX ADMIN — ATORVASTATIN CALCIUM 80 MG: 80 TABLET, FILM COATED ORAL at 21:21

## 2018-11-09 RX ADMIN — POTASSIUM CHLORIDE 40 MEQ: 750 CAPSULE, EXTENDED RELEASE ORAL at 11:37

## 2018-11-09 RX ADMIN — Medication 3 ML: at 08:10

## 2018-11-10 VITALS
DIASTOLIC BLOOD PRESSURE: 75 MMHG | TEMPERATURE: 97.4 F | RESPIRATION RATE: 18 BRPM | WEIGHT: 168.43 LBS | HEART RATE: 71 BPM | SYSTOLIC BLOOD PRESSURE: 149 MMHG | OXYGEN SATURATION: 83 % | BODY MASS INDEX: 26.44 KG/M2 | HEIGHT: 67 IN

## 2018-11-10 LAB
ANION GAP SERPL CALCULATED.3IONS-SCNC: 13.2 MMOL/L
BUN BLD-MCNC: 19 MG/DL (ref 8–23)
BUN/CREAT SERPL: 42.2 (ref 7–25)
CALCIUM SPEC-SCNC: 9.9 MG/DL (ref 8.6–10.5)
CHLORIDE SERPL-SCNC: 113 MMOL/L (ref 98–107)
CO2 SERPL-SCNC: 17.8 MMOL/L (ref 22–29)
CREAT BLD-MCNC: 0.45 MG/DL (ref 0.57–1)
DEPRECATED RDW RBC AUTO: 47 FL (ref 37–54)
ERYTHROCYTE [DISTWIDTH] IN BLOOD BY AUTOMATED COUNT: 14 % (ref 11.7–13)
GFR SERPL CREATININE-BSD FRML MDRD: 134 ML/MIN/1.73
GLUCOSE BLD-MCNC: 149 MG/DL (ref 65–99)
HCT VFR BLD AUTO: 40.2 % (ref 35.6–45.5)
HGB BLD-MCNC: 12.8 G/DL (ref 11.9–15.5)
MCH RBC QN AUTO: 29 PG (ref 26.9–32)
MCHC RBC AUTO-ENTMCNC: 31.8 G/DL (ref 32.4–36.3)
MCV RBC AUTO: 91 FL (ref 80.5–98.2)
PLATELET # BLD AUTO: 135 10*3/MM3 (ref 140–500)
PMV BLD AUTO: 13 FL (ref 6–12)
POTASSIUM BLD-SCNC: 4.2 MMOL/L (ref 3.5–5.2)
RBC # BLD AUTO: 4.42 10*6/MM3 (ref 3.9–5.2)
SODIUM BLD-SCNC: 144 MMOL/L (ref 136–145)
WBC NRBC COR # BLD: 8.37 10*3/MM3 (ref 4.5–10.7)

## 2018-11-10 PROCEDURE — 85027 COMPLETE CBC AUTOMATED: CPT | Performed by: HOSPITALIST

## 2018-11-10 PROCEDURE — 97110 THERAPEUTIC EXERCISES: CPT

## 2018-11-10 PROCEDURE — 80048 BASIC METABOLIC PNL TOTAL CA: CPT | Performed by: HOSPITALIST

## 2018-11-10 PROCEDURE — 25010000002 DEXAMETHASONE PER 1 MG: Performed by: NURSE PRACTITIONER

## 2018-11-10 RX ORDER — LEVETIRACETAM 500 MG/1
500 TABLET ORAL 2 TIMES DAILY
Qty: 60 TABLET | Refills: 0 | Status: SHIPPED | OUTPATIENT
Start: 2018-11-10 | End: 2018-11-29 | Stop reason: SDUPTHER

## 2018-11-10 RX ORDER — LEVOTHYROXINE SODIUM 0.07 MG/1
75 TABLET ORAL DAILY
Qty: 30 TABLET | Refills: 0 | Status: SHIPPED | OUTPATIENT
Start: 2018-11-10 | End: 2018-12-06 | Stop reason: SDUPTHER

## 2018-11-10 RX ADMIN — LEVETIRACETAM 500 MG: 500 TABLET, FILM COATED ORAL at 08:58

## 2018-11-10 RX ADMIN — LISINOPRIL: 20 TABLET ORAL at 08:55

## 2018-11-10 RX ADMIN — LEVOTHYROXINE SODIUM 75 MCG: 75 TABLET ORAL at 06:21

## 2018-11-10 RX ADMIN — PAROXETINE HYDROCHLORIDE 10 MG: 10 TABLET, FILM COATED ORAL at 08:55

## 2018-11-10 RX ADMIN — FAMOTIDINE 20 MG: 20 TABLET, FILM COATED ORAL at 08:55

## 2018-11-10 RX ADMIN — Medication 3 ML: at 08:58

## 2018-11-10 RX ADMIN — DEXAMETHASONE SODIUM PHOSPHATE 4 MG: 4 INJECTION, SOLUTION INTRAMUSCULAR; INTRAVENOUS at 02:00

## 2018-11-10 RX ADMIN — DEXAMETHASONE SODIUM PHOSPHATE 4 MG: 4 INJECTION, SOLUTION INTRAMUSCULAR; INTRAVENOUS at 08:58

## 2018-11-10 RX ADMIN — PROPRANOLOL HYDROCHLORIDE 40 MG: 40 TABLET ORAL at 08:58

## 2018-11-10 RX ADMIN — ASPIRIN 325 MG: 325 TABLET, DELAYED RELEASE ORAL at 08:58

## 2018-11-15 ENCOUNTER — OFFICE VISIT (OUTPATIENT)
Dept: FAMILY MEDICINE CLINIC | Facility: CLINIC | Age: 78
End: 2018-11-15

## 2018-11-15 ENCOUNTER — TRANSITIONAL CARE MANAGEMENT TELEPHONE ENCOUNTER (OUTPATIENT)
Dept: FAMILY MEDICINE CLINIC | Facility: CLINIC | Age: 78
End: 2018-11-15

## 2018-11-15 VITALS
SYSTOLIC BLOOD PRESSURE: 120 MMHG | DIASTOLIC BLOOD PRESSURE: 80 MMHG | RESPIRATION RATE: 17 BRPM | BODY MASS INDEX: 24.8 KG/M2 | TEMPERATURE: 98.4 F | OXYGEN SATURATION: 98 % | HEART RATE: 84 BPM | WEIGHT: 158 LBS | HEIGHT: 67 IN

## 2018-11-15 DIAGNOSIS — R56.9 SEIZURE (HCC): ICD-10-CM

## 2018-11-15 DIAGNOSIS — I10 BENIGN ESSENTIAL HYPERTENSION: ICD-10-CM

## 2018-11-15 DIAGNOSIS — D32.0 MENINGIOMA, CEREBRAL (HCC): ICD-10-CM

## 2018-11-15 DIAGNOSIS — E03.9 ACQUIRED HYPOTHYROIDISM: Primary | ICD-10-CM

## 2018-11-15 PROCEDURE — 99214 OFFICE O/P EST MOD 30 MIN: CPT | Performed by: INTERNAL MEDICINE

## 2018-11-15 NOTE — PROGRESS NOTES
Subjective   Karolina Ornelas is a 78 y.o. female. Patient is here today for follow-up on a hospital admission on November 8.  Patient had TIA symptoms and presented to the emergency room.  She was ultimately found to have a meningioma and was felt to have had a seizure secondary to that.  She was seen by neurology and neurosurgery and has a follow-up neurosurgeons appointment.  He was started on Decadron and antiseizure medication and is here for follow-up.  She is generally feeling fine currently and having no side effects with the medications.  She's had no further neurologic symptoms.   Chief Complaint   Patient presents with   • Seizures     HOSPITAL FOLLOW UP       (Not on file)-  Risk for Readmission (LACE) Score: 6 (11/10/2018  6:00 AM)           Vitals:    11/15/18 1431   BP: 120/80   Pulse: 84   Resp: 17   Temp: 98.4 °F (36.9 °C)   SpO2: 98%     The following portions of the patient's history were reviewed and updated as appropriate: allergies, current medications, past family history, past medical history, past social history, past surgical history and problem list.    Past Medical History:   Diagnosis Date   • Benign essential hypertension    • Breast cancer (CMS/HCC) 20 years ago    negative LN per patient- mastectomy and 5 years of tamoxifen   • Disease of thyroid gland    • Hyperglycemia       Allergies   Allergen Reactions   • Cefaclor    • Shellfish-Derived Products Swelling      Social History     Socioeconomic History   • Marital status: Single     Spouse name: Not on file   • Number of children: Not on file   • Years of education: Not on file   • Highest education level: Not on file   Social Needs   • Financial resource strain: Not on file   • Food insecurity - worry: Not on file   • Food insecurity - inability: Not on file   • Transportation needs - medical: Not on file   • Transportation needs - non-medical: Not on file   Occupational History   • Not on file   Tobacco Use   • Smoking status: Never  Smoker   • Smokeless tobacco: Never Used   Substance and Sexual Activity   • Alcohol use: No   • Drug use: No   • Sexual activity: Defer   Other Topics Concern   • Not on file   Social History Narrative   • Not on file        Current Outpatient Medications:   •  alendronate (FOSAMAX) 70 MG tablet, Take 1 tablet by mouth Every 7 (Seven) Days., Disp: 12 tablet, Rfl: 3  •  atorvastatin (LIPITOR) 20 MG tablet, TAKE ONE TABLET BY MOUTH ONCE DAILY, Disp: 90 tablet, Rfl: 3  •  Cholecalciferol (VITAMIN D PO), Take  by mouth., Disp: , Rfl:   •  dexamethasone (DECADRON) 2 MG tablet, Take 1 tablet by mouth 3 (Three) Times a Day After Meals., Disp: 60 tablet, Rfl: 1  •  levETIRAcetam (KEPPRA) 500 MG tablet, Take 1 tablet by mouth 2 (Two) Times a Day., Disp: 60 tablet, Rfl: 0  •  levothyroxine (SYNTHROID, LEVOTHROID) 75 MCG tablet, Take 1 tablet by mouth Daily., Disp: 30 tablet, Rfl: 0  •  lisinopril-hydrochlorothiazide (PRINZIDE,ZESTORETIC) 20-12.5 MG per tablet, TAKE ONE TABLET BY MOUTH ONCE DAILY, Disp: 90 tablet, Rfl: 3  •  PARoxetine (PAXIL) 10 MG tablet, TAKE ONE TABLET BY MOUTH ONCE DAILY IN THE MORNING, Disp: 90 tablet, Rfl: 3  •  propranolol (INDERAL) 40 MG tablet, Take 1 tablet by mouth 2 (Two) Times a Day., Disp: 180 tablet, Rfl: 3     Objective     History of Present Illness     Review of Systems   Constitutional: Negative.    HENT: Negative.    Eyes: Negative.    Respiratory: Negative.    Cardiovascular: Negative.    Gastrointestinal: Negative.    Genitourinary: Negative.    Musculoskeletal: Negative.    Skin: Negative.    Neurological: Positive for seizures.   Psychiatric/Behavioral: Negative.        Physical Exam   Constitutional: She is oriented to person, place, and time. She appears well-developed and well-nourished.   Pleasant, cooperative no distress, 130/80   HENT:   Head: Normocephalic and atraumatic.   Eyes: Conjunctivae are normal. Pupils are equal, round, and reactive to light. No scleral icterus.    Neck: Normal range of motion. Neck supple.   Cardiovascular: Normal rate, regular rhythm and normal heart sounds.   Pulmonary/Chest: Effort normal and breath sounds normal. No stridor. No respiratory distress. She has no wheezes. She has no rales.   Musculoskeletal: Normal range of motion.   Neurological: She is alert and oriented to person, place, and time. No cranial nerve deficit.   Skin: Skin is warm and dry.   Psychiatric: She has a normal mood and affect. Her behavior is normal.   Nursing note and vitals reviewed.      ASSESSMENT  I reviewed the patient's hospital records and CT and MRI scans which showed a meningioma with minimal 4 mm brain shift and some edema.  She is on Decadron and Keppra and is tolerating those medicines.  She's had no further seizures.  Her TSH was elevated so her thyroid dose was slightly increased.  I had a long discussion with the patient about her meningioma and seizures and have encouraged her to follow-up with the neurosurgeon as scheduled.  #1-meningioma in the brain  #2-seizures secondary to 1  #3-hypertension  #4-hypothyroidism     Problem List Items Addressed This Visit        Cardiovascular and Mediastinum    Benign essential hypertension       Endocrine    Acquired hypothyroidism - Primary       Nervous and Auditory    Meningioma, cerebral (CMS/HCC)    Seizure (CMS/HCC)          Current outpatient and discharge medications have been reconciled for the patient.  Reviewed by: Mak Lemos MD      PLAN  the patient will continue current medicines as now.  I would like to recheck her in about 6 weeks with a CBC, CMP, hemoglobin A1c, TSH and free T4    There are no Patient Instructions on file for this visit.  Return in about 6 weeks (around 12/27/2018) for with labs.

## 2018-11-16 ENCOUNTER — TELEPHONE (OUTPATIENT)
Dept: NEUROSURGERY | Facility: CLINIC | Age: 78
End: 2018-11-16

## 2018-11-16 NOTE — TELEPHONE ENCOUNTER
----- Message from Stanley Oliveria IV, MD sent at 11/16/2018  9:15 AM EST -----  Regarding: FW: f/u      ----- Message -----  From: Berna Chakraborty APRN  Sent: 11/9/2018   3:56 PM  To: Stanley Oliveira IV, MD  Subject: f/u                                              Pt seen for brain mass  by Dr. Stanley Oliveira on November 9, 2018. Need follow up in 2 week(s)  without imaging. Ok to see MD for surgical discussion.

## 2018-11-16 NOTE — TELEPHONE ENCOUNTER
Hospital follow up is 11/29/18 at 3:45 PM. She will arrive at 3:30. Letter/ Forms sent to patient.

## 2018-11-29 ENCOUNTER — OFFICE VISIT (OUTPATIENT)
Dept: NEUROSURGERY | Facility: CLINIC | Age: 78
End: 2018-11-29

## 2018-11-29 VITALS
DIASTOLIC BLOOD PRESSURE: 80 MMHG | HEIGHT: 67 IN | BODY MASS INDEX: 24.8 KG/M2 | WEIGHT: 158 LBS | HEART RATE: 86 BPM | RESPIRATION RATE: 18 BRPM | SYSTOLIC BLOOD PRESSURE: 132 MMHG

## 2018-11-29 DIAGNOSIS — D32.0 MENINGIOMA, CEREBRAL (HCC): Primary | ICD-10-CM

## 2018-11-29 DIAGNOSIS — R56.9 SEIZURE (HCC): ICD-10-CM

## 2018-11-29 PROCEDURE — 99214 OFFICE O/P EST MOD 30 MIN: CPT | Performed by: NEUROLOGICAL SURGERY

## 2018-11-29 RX ORDER — LEVETIRACETAM 500 MG/1
500 TABLET ORAL 2 TIMES DAILY
Qty: 60 TABLET | Refills: 3 | Status: SHIPPED | OUTPATIENT
Start: 2018-11-29 | End: 2019-03-27 | Stop reason: SDUPTHER

## 2018-11-29 RX ORDER — FAMOTIDINE 20 MG/1
20 TABLET, FILM COATED ORAL 2 TIMES DAILY
COMMUNITY
End: 2019-03-27

## 2018-11-29 RX ORDER — DEXAMETHASONE 1 MG
TABLET ORAL
Qty: 60 TABLET | Refills: 1 | Status: SHIPPED | OUTPATIENT
Start: 2018-11-29 | End: 2019-01-03

## 2018-11-29 NOTE — PROGRESS NOTES
Subjective   Patient ID: Karolina Ornelas is a 78 y.o. female is here today for follow-up brain mass. Patient had a CT and MRI of the brain 11/08/2018 and presents accompanied by her brother.    History of Present Illness 79 yo lady with a right sided tumor.  She has some edema related to this.  She was started on Keppra.  She was also place don some low dose steroids.  No weakness or numbness.  She feels jittery on the steroids.      The following portions of the patient's history were reviewed and updated as appropriate: allergies, current medications, past family history, past medical history, past social history, past surgical history and problem list.    Review of Systems   HENT: Negative for tinnitus.    Eyes: Positive for pain (feels like something in eyes occ'l). Negative for visual disturbance.   Musculoskeletal: Positive for gait problem.   Neurological: Negative for dizziness, tremors, seizures, syncope, speech difficulty, weakness, light-headedness, numbness and headaches.   Psychiatric/Behavioral: Negative for confusion and decreased concentration.       Objective   Physical Exam  Neurologic Exam   4-6-5  No drift  Ft n intact  Face symmetric      Assessment/Plan   Independent Review of Radiographic Studies:  I reviewed her MRI with her.      Medical Decision Making:  We discussed her tumor and r/b/a of resection.  We discussed the surgery in detail.  The alternative is to observe this.  I do not recommend this given her edema.  However the choice is , of course, hers to make.  We will wean her steroids and see how she feels.  I refilled her steroids (taper) and her Keppra.  I think she is leaning toward observation.  I will taper her steroids and we will see how she is doing.      Karolina was seen today for brain tumor.    Diagnoses and all orders for this visit:    Meningioma, cerebral (CMS/HCC)  -     MRI Brain With & Without Contrast; Future    Seizure (CMS/HCC)  -     MRI Brain With & Without Contrast;  Future    Other orders  -     dexamethasone (DECADRON) 1 MG tablet; Take 1 tablet tid for 3 days, then bid for 3 days , then 1/2 tablet bid for 3 days, then 1/2 tablet qam for 3 days, then stop  -     levETIRAcetam (KEPPRA) 500 MG tablet; Take 1 tablet by mouth 2 (Two) Times a Day.      No Follow-up on file.

## 2018-12-06 RX ORDER — LEVOTHYROXINE SODIUM 0.07 MG/1
75 TABLET ORAL DAILY
Qty: 30 TABLET | Refills: 2 | Status: SHIPPED | OUTPATIENT
Start: 2018-12-06 | End: 2019-03-05 | Stop reason: SDUPTHER

## 2018-12-13 ENCOUNTER — TELEPHONE (OUTPATIENT)
Dept: NEUROSURGERY | Facility: CLINIC | Age: 78
End: 2018-12-13

## 2018-12-13 NOTE — TELEPHONE ENCOUNTER
Spoke with patient, per [facility], inpatient rehab stay would be best ordered by PCP. Could order outpatient rehab if patient requests. Patient states that she would like to explore home PT but will contact her PCP to arrange.

## 2018-12-13 NOTE — TELEPHONE ENCOUNTER
Pt was last seen on 11/29 for meningioma with [facility].    Pt calls stating that she has recently come off of steroids, and since then she has been feeling weakness in her legs. She wants to know if we recommend a rehab stay because she lives alone.    [phone]

## 2018-12-17 DIAGNOSIS — E03.9 ACQUIRED HYPOTHYROIDISM: Primary | ICD-10-CM

## 2018-12-17 DIAGNOSIS — R73.9 HYPERGLYCEMIA: ICD-10-CM

## 2018-12-17 DIAGNOSIS — T38.0X5A STEROID MYOPATHY: ICD-10-CM

## 2018-12-17 DIAGNOSIS — Z13.1 SCREENING FOR DIABETES MELLITUS: ICD-10-CM

## 2018-12-17 DIAGNOSIS — G72.9: Primary | ICD-10-CM

## 2018-12-17 DIAGNOSIS — Z79.899 LONG TERM USE OF DRUG: ICD-10-CM

## 2018-12-17 DIAGNOSIS — E87.6 HYPOKALEMIA: ICD-10-CM

## 2018-12-17 DIAGNOSIS — G72.0 STEROID MYOPATHY: ICD-10-CM

## 2018-12-18 ENCOUNTER — TELEPHONE (OUTPATIENT)
Dept: FAMILY MEDICINE CLINIC | Facility: CLINIC | Age: 78
End: 2018-12-18

## 2018-12-18 NOTE — TELEPHONE ENCOUNTER
CALLED AND S/W PT- SHE WANTS [facility] TO COME IN AND DO PHYSICAL THERAPY. ORDER HAS BEEN FAXED TO [facility] -[phone]. NED. PATIENT IS AWARE THEY WILL CONTACT HER TO SET UP APPT. [facility].       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        ----- Message -----  From: Joseline Jim MA  Sent: 12/14/2018   2:54 PM  To: Mak Lemos MD    PLEASE REVIEW AND ADVISE  ----- Message -----  From: Belinda Mead  Sent: 12/14/2018  12:35 PM  To: Joseline Jim MA        ----- Message -----  From: Belinda Mead  Sent: 12/14/2018   9:17 AM  To: Joseline Jim MA, Kathryn Pinion    She left the wrong number    This is the correct number [phone]  ----- Message -----  From: Belinda Mead  Sent: 12/14/2018   9:10 AM  To: Joseline Jim MA    Needing a referral for rehab/pt to tone ur muscles    Her steroids have messed up her muscles and she is not walking well or getting around well     She is to see dr nails on 12/27/18  But she was wanting to go ahead and get a head start on the referral for rehab/pt    Please call pt to discuss and let her know what dr nails decides     [phone]

## 2018-12-19 DIAGNOSIS — T38.0X5A STEROID MYOPATHY: Primary | ICD-10-CM

## 2018-12-19 DIAGNOSIS — G72.0 STEROID MYOPATHY: Primary | ICD-10-CM

## 2018-12-20 ENCOUNTER — TELEPHONE (OUTPATIENT)
Dept: FAMILY MEDICINE CLINIC | Facility: CLINIC | Age: 78
End: 2018-12-20

## 2018-12-20 NOTE — TELEPHONE ENCOUNTER
THIS HAS BEEN TAKEN CARE OF. PATIENT IS AWARE.     ----- Message from Joseline Jim MA sent at 12/19/2018 10:05 AM EST -----   order placed please call patient with next steps thank you  ----- Message -----  From: Mak Lemos MD  Sent: 12/19/2018   9:35 AM  To: Joseline Jim MA    Steroids related myopathy          ----- Message -----  From: Joseline Jim MA  Sent: 12/19/2018   9:02 AM  To: Mak Lemos MD    What would the diagnosis be?  ----- Message -----  From: Mak Lemos MD  Sent: 12/19/2018   8:46 AM  To: Joseline Jim MA    It's okay to try this and see if she qualifies            ----- Message -----  From: Joseline Jim MA  Sent: 12/17/2018   8:53 AM  To: Mak Lemos MD    Please advise  ----- Message -----  From: Lamar Clark  Sent: 12/17/2018   8:17 AM  To: Joseline Jim MA    PT IS WANTING A REF FOR HOME HEALTH CARE.    PLEASE CALL PT BACK ABOUT THIS [phone] CELL- OK TO LEAVE VMAIL

## 2018-12-27 LAB
ALBUMIN SERPL-MCNC: 3.6 G/DL (ref 3.5–5.2)
ALBUMIN/GLOB SERPL: 1.5 G/DL
ALP SERPL-CCNC: 82 U/L (ref 39–117)
ALT SERPL-CCNC: 24 U/L (ref 1–33)
AST SERPL-CCNC: 20 U/L (ref 1–32)
BASOPHILS # BLD AUTO: 0.02 10*3/MM3 (ref 0–0.2)
BASOPHILS NFR BLD AUTO: 0.3 % (ref 0–1.5)
BILIRUB SERPL-MCNC: 0.6 MG/DL (ref 0.1–1.2)
BUN SERPL-MCNC: 20 MG/DL (ref 8–23)
BUN/CREAT SERPL: 27 (ref 7–25)
CALCIUM SERPL-MCNC: 10.6 MG/DL (ref 8.6–10.5)
CHLORIDE SERPL-SCNC: 104 MMOL/L (ref 98–107)
CO2 SERPL-SCNC: 31 MMOL/L (ref 22–29)
CREAT SERPL-MCNC: 0.74 MG/DL (ref 0.57–1)
EOSINOPHIL # BLD AUTO: 0.06 10*3/MM3 (ref 0–0.7)
EOSINOPHIL NFR BLD AUTO: 0.8 % (ref 0.3–6.2)
ERYTHROCYTE [DISTWIDTH] IN BLOOD BY AUTOMATED COUNT: 15.7 % (ref 11.7–13)
GLOBULIN SER CALC-MCNC: 2.4 GM/DL
GLUCOSE SERPL-MCNC: 103 MG/DL (ref 65–99)
HBA1C MFR BLD: 5.8 % (ref 4.8–5.6)
HCT VFR BLD AUTO: 40.5 % (ref 35.6–45.5)
HGB BLD-MCNC: 12.8 G/DL (ref 11.9–15.5)
IMM GRANULOCYTES # BLD: 0.12 10*3/MM3 (ref 0–0.03)
IMM GRANULOCYTES NFR BLD: 1.6 % (ref 0–0.5)
LYMPHOCYTES # BLD AUTO: 1.06 10*3/MM3 (ref 0.9–4.8)
LYMPHOCYTES NFR BLD AUTO: 13.9 % (ref 19.6–45.3)
MCH RBC QN AUTO: 29.6 PG (ref 26.9–32)
MCHC RBC AUTO-ENTMCNC: 31.6 G/DL (ref 32.4–36.3)
MCV RBC AUTO: 93.5 FL (ref 80.5–98.2)
MONOCYTES # BLD AUTO: 0.55 10*3/MM3 (ref 0.2–1.2)
MONOCYTES NFR BLD AUTO: 7.2 % (ref 5–12)
NEUTROPHILS # BLD AUTO: 5.94 10*3/MM3 (ref 1.9–8.1)
NEUTROPHILS NFR BLD AUTO: 77.8 % (ref 42.7–76)
PLATELET # BLD AUTO: 274 10*3/MM3 (ref 140–500)
POTASSIUM SERPL-SCNC: 3.5 MMOL/L (ref 3.5–5.2)
PROT SERPL-MCNC: 6 G/DL (ref 6–8.5)
RBC # BLD AUTO: 4.33 10*6/MM3 (ref 3.9–5.2)
SODIUM SERPL-SCNC: 145 MMOL/L (ref 136–145)
T4 FREE SERPL-MCNC: 2.13 NG/DL (ref 0.93–1.7)
TSH SERPL DL<=0.005 MIU/L-ACNC: 2.99 MIU/ML (ref 0.27–4.2)
WBC # BLD AUTO: 7.63 10*3/MM3 (ref 4.5–10.7)

## 2019-01-03 ENCOUNTER — OFFICE VISIT (OUTPATIENT)
Dept: FAMILY MEDICINE CLINIC | Facility: CLINIC | Age: 79
End: 2019-01-03

## 2019-01-03 VITALS
DIASTOLIC BLOOD PRESSURE: 70 MMHG | SYSTOLIC BLOOD PRESSURE: 120 MMHG | HEART RATE: 76 BPM | BODY MASS INDEX: 24.96 KG/M2 | OXYGEN SATURATION: 98 % | WEIGHT: 159 LBS | HEIGHT: 67 IN | RESPIRATION RATE: 16 BRPM | TEMPERATURE: 97.2 F

## 2019-01-03 DIAGNOSIS — I10 BENIGN ESSENTIAL HYPERTENSION: ICD-10-CM

## 2019-01-03 DIAGNOSIS — T38.0X5A MYOPATHY DUE TO THERAPEUTIC USE OF CORTICOSTEROID: ICD-10-CM

## 2019-01-03 DIAGNOSIS — G72.0 MYOPATHY DUE TO THERAPEUTIC USE OF CORTICOSTEROID: ICD-10-CM

## 2019-01-03 DIAGNOSIS — D32.0 MENINGIOMA, CEREBRAL (HCC): ICD-10-CM

## 2019-01-03 DIAGNOSIS — E03.9 ACQUIRED HYPOTHYROIDISM: ICD-10-CM

## 2019-01-03 DIAGNOSIS — E78.5 HYPERLIPIDEMIA, UNSPECIFIED HYPERLIPIDEMIA TYPE: Primary | ICD-10-CM

## 2019-01-03 DIAGNOSIS — R60.9 EDEMA, UNSPECIFIED TYPE: ICD-10-CM

## 2019-01-03 DIAGNOSIS — E87.6 HYPOKALEMIA: ICD-10-CM

## 2019-01-03 DIAGNOSIS — R56.9 SEIZURE (HCC): ICD-10-CM

## 2019-01-03 PROCEDURE — 99214 OFFICE O/P EST MOD 30 MIN: CPT | Performed by: INTERNAL MEDICINE

## 2019-01-03 RX ORDER — FUROSEMIDE 20 MG/1
20 TABLET ORAL DAILY
Qty: 30 TABLET | Refills: 2 | Status: SHIPPED | OUTPATIENT
Start: 2019-01-03 | End: 2019-02-01 | Stop reason: SDUPTHER

## 2019-01-03 NOTE — PROGRESS NOTES
Subjective   Karolina Ornelas is a 78 y.o. female. 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  She currently is on Keppra.  She was on steroids and developed muscle weakness is getting physical therapy.  She's now off the steroids.  She is ambulating using a walker.  Chief Complaint   Patient presents with   • Hypothyroidism   • Hyperlipidemia   • Gait Problem          Vitals:    01/03/19 1319   BP: 120/70   Pulse: 76   Resp: 16   Temp: 97.2 °F (36.2 °C)   SpO2: 98%     The following portions of the patient's history were reviewed and updated as appropriate: allergies, current medications, past family history, past medical history, past social history, past surgical history and problem list.    Past Medical History:   Diagnosis Date   • Arthritis    • Benign essential hypertension    • Breast cancer (CMS/HCC) 20 years ago    negative LN per patient- mastectomy and 5 years of tamoxifen   • Broken bones    • Disease of thyroid gland    • Hyperglycemia    • MVP (mitral valve prolapse)    • Osteoporosis    • Seizures (CMS/HCC)       Allergies   Allergen Reactions   • Cefaclor    • Shellfish-Derived Products Swelling      Social History     Socioeconomic History   • Marital status: Single     Spouse name: Not on file   • Number of children: Not on file   • Years of education: Not on file   • Highest education level: Not on file   Social Needs   • Financial resource strain: Not on file   • Food insecurity - worry: Not on file   • Food insecurity - inability: Not on file   • Transportation needs - medical: Not on file   • Transportation needs - non-medical: Not on file   Occupational History   • Occupation: retired teacher   Tobacco Use   • Smoking status: Never Smoker   • Smokeless tobacco: Never Used   Substance and Sexual Activity   • Alcohol use: No   • Drug use: No   • Sexual activity:  Defer   Other Topics Concern   • Not on file   Social History Narrative   • Not on file        Current Outpatient Medications:   •  alendronate (FOSAMAX) 70 MG tablet, Take 1 tablet by mouth Every 7 (Seven) Days., Disp: 12 tablet, Rfl: 3  •  atorvastatin (LIPITOR) 20 MG tablet, TAKE ONE TABLET BY MOUTH ONCE DAILY, Disp: 90 tablet, Rfl: 3  •  Calcium Carbonate (CALTRATE 600 PO), Take  by mouth., Disp: , Rfl:   •  Cholecalciferol (VITAMIN D PO), Take  by mouth., Disp: , Rfl:   •  famotidine (PEPCID) 20 MG tablet, Take 20 mg by mouth 2 (Two) Times a Day., Disp: , Rfl:   •  furosemide (LASIX) 20 MG tablet, Take 1 tablet by mouth Daily., Disp: 30 tablet, Rfl: 2  •  levETIRAcetam (KEPPRA) 500 MG tablet, Take 1 tablet by mouth 2 (Two) Times a Day., Disp: 60 tablet, Rfl: 3  •  levothyroxine (SYNTHROID, LEVOTHROID) 75 MCG tablet, Take 1 tablet by mouth Daily., Disp: 30 tablet, Rfl: 2  •  lisinopril-hydrochlorothiazide (PRINZIDE,ZESTORETIC) 20-12.5 MG per tablet, TAKE ONE TABLET BY MOUTH ONCE DAILY, Disp: 90 tablet, Rfl: 3  •  PARoxetine (PAXIL) 10 MG tablet, TAKE ONE TABLET BY MOUTH ONCE DAILY IN THE MORNING, Disp: 90 tablet, Rfl: 3  •  propranolol (INDERAL) 40 MG tablet, Take 1 tablet by mouth 2 (Two) Times a Day., Disp: 180 tablet, Rfl: 3     Objective     History of Present Illness     Review of Systems   Constitutional: Negative.    HENT: Negative.    Eyes: Negative.    Respiratory: Negative.    Cardiovascular: Negative.    Gastrointestinal: Negative.    Genitourinary: Negative.    Musculoskeletal: Positive for gait problem.   Skin: Negative.    Psychiatric/Behavioral: Negative.        Physical Exam   Constitutional: She appears well-developed and well-nourished.   Pleasant, cooperative and in no distress but having to use a walker to ambulate because of leg weakness.   HENT:   Head: Normocephalic and atraumatic.   Eyes: Conjunctivae are normal. Pupils are equal, round, and reactive to light. No scleral icterus.   Neck:  Normal range of motion. Neck supple.   Cardiovascular: Normal rate, regular rhythm and normal heart sounds.   Pulmonary/Chest: Effort normal and breath sounds normal. No respiratory distress. She has no wheezes. She has no rales.   Musculoskeletal: She exhibits edema.   Patient has some edema in her right arm and in both legs.  She also has some leg muscle weakness and has difficulty getting up from a chair, all since she was on the steroids.   Neurological: She is alert.   Skin: Skin is warm and dry.   Psychiatric: She has a normal mood and affect. Her behavior is normal.   Nursing note and vitals reviewed.      ASSESSMENT  CBC is essentially normal.  CMP had an elevated sugar of 103, calcium of 10.6 and was otherwise normal.  Hemoglobin A1c is certainly acceptable at 5.8.  TSH is normal and free T4 is somewhat high at 2.13 on 75 µg of levothyroxine.  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  #5-history of cerebral meningioma with seizure, currently asymptomatic and seizure free  #6-dependent edema, probably related to steroids use     Problem List Items Addressed This Visit        Cardiovascular and Mediastinum    Benign essential hypertension    Relevant Medications    furosemide (LASIX) 20 MG tablet    Hyperlipidemia - Primary       Endocrine    Acquired hypothyroidism       Nervous and Auditory    Meningioma, cerebral (CMS/HCC)    Seizure (CMS/HCC)       Musculoskeletal and Integument    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  She'll continue physical therapy.  I'm starting her on Lasix 20 mg daily for her edema and I like to recheck her in a month and see how she's coming along    There are no Patient Instructions on file for this visit.  Return in about 1 month (around 2/3/2019).

## 2019-02-01 ENCOUNTER — OFFICE VISIT (OUTPATIENT)
Dept: FAMILY MEDICINE CLINIC | Facility: CLINIC | Age: 79
End: 2019-02-01

## 2019-02-01 VITALS
HEART RATE: 79 BPM | OXYGEN SATURATION: 98 % | TEMPERATURE: 97 F | WEIGHT: 158 LBS | RESPIRATION RATE: 16 BRPM | SYSTOLIC BLOOD PRESSURE: 120 MMHG | DIASTOLIC BLOOD PRESSURE: 60 MMHG | BODY MASS INDEX: 24.8 KG/M2 | HEIGHT: 67 IN

## 2019-02-01 DIAGNOSIS — E78.5 HYPERLIPIDEMIA, UNSPECIFIED HYPERLIPIDEMIA TYPE: ICD-10-CM

## 2019-02-01 DIAGNOSIS — R56.9 SEIZURE (HCC): ICD-10-CM

## 2019-02-01 DIAGNOSIS — I10 BENIGN ESSENTIAL HYPERTENSION: Primary | ICD-10-CM

## 2019-02-01 DIAGNOSIS — R73.9 HYPERGLYCEMIA: ICD-10-CM

## 2019-02-01 DIAGNOSIS — D32.0 MENINGIOMA, CEREBRAL (HCC): ICD-10-CM

## 2019-02-01 DIAGNOSIS — R60.9 EDEMA, UNSPECIFIED TYPE: ICD-10-CM

## 2019-02-01 DIAGNOSIS — E03.9 ACQUIRED HYPOTHYROIDISM: ICD-10-CM

## 2019-02-01 PROCEDURE — 99214 OFFICE O/P EST MOD 30 MIN: CPT | Performed by: INTERNAL MEDICINE

## 2019-02-01 RX ORDER — FUROSEMIDE 40 MG/1
40 TABLET ORAL DAILY
Qty: 30 TABLET | Refills: 5 | Status: SHIPPED | OUTPATIENT
Start: 2019-02-01 | End: 2019-05-08

## 2019-02-01 NOTE — PROGRESS NOTES
Subjective   Karolina Ornelas is a 78 y.o. female. Patient is here today for follow-up on her hypertension, hyperlipidemia, hypothyroidism, hyperglycemia and recent meningioma.  The patient is off steroids now and has a follow-up scan scheduled by the neurosurgeon and late March.  She's had no further seizures.  She generally feels okay but continues with some edema in her right arm and also some edema in both lower legs.  She's having no signs of congestive heart failure.  Chief Complaint   Patient presents with   • Edema     follow up          Vitals:    02/01/19 1331   BP: 120/60   Pulse: 79   Resp: 16   Temp: 97 °F (36.1 °C)   SpO2: 98%     The following portions of the patient's history were reviewed and updated as appropriate: allergies, current medications, past family history, past medical history, past social history, past surgical history and problem list.    Past Medical History:   Diagnosis Date   • Arthritis    • Benign essential hypertension    • Breast cancer (CMS/HCC) 20 years ago    negative LN per patient- mastectomy and 5 years of tamoxifen   • Broken bones    • Disease of thyroid gland    • Hyperglycemia    • MVP (mitral valve prolapse)    • Osteoporosis    • Seizures (CMS/HCC)       Allergies   Allergen Reactions   • Cefaclor    • Shellfish-Derived Products Swelling      Social History     Socioeconomic History   • Marital status: Single     Spouse name: Not on file   • Number of children: Not on file   • Years of education: Not on file   • Highest education level: Not on file   Social Needs   • Financial resource strain: Not on file   • Food insecurity - worry: Not on file   • Food insecurity - inability: Not on file   • Transportation needs - medical: Not on file   • Transportation needs - non-medical: Not on file   Occupational History   • Occupation: retired teacher   Tobacco Use   • Smoking status: Never Smoker   • Smokeless tobacco: Never Used   Substance and Sexual Activity   • Alcohol use:  No   • Drug use: No   • Sexual activity: Defer   Other Topics Concern   • Not on file   Social History Narrative   • Not on file        Current Outpatient Medications:   •  alendronate (FOSAMAX) 70 MG tablet, Take 1 tablet by mouth Every 7 (Seven) Days., Disp: 12 tablet, Rfl: 3  •  atorvastatin (LIPITOR) 20 MG tablet, TAKE ONE TABLET BY MOUTH ONCE DAILY, Disp: 90 tablet, Rfl: 3  •  Calcium Carbonate (CALTRATE 600 PO), Take  by mouth., Disp: , Rfl:   •  Cholecalciferol (VITAMIN D PO), Take  by mouth., Disp: , Rfl:   •  famotidine (PEPCID) 20 MG tablet, Take 20 mg by mouth 2 (Two) Times a Day., Disp: , Rfl:   •  furosemide (LASIX) 20 MG tablet, Take 1 tablet by mouth Daily., Disp: 30 tablet, Rfl: 2  •  levETIRAcetam (KEPPRA) 500 MG tablet, Take 1 tablet by mouth 2 (Two) Times a Day., Disp: 60 tablet, Rfl: 3  •  levothyroxine (SYNTHROID, LEVOTHROID) 75 MCG tablet, Take 1 tablet by mouth Daily., Disp: 30 tablet, Rfl: 2  •  lisinopril-hydrochlorothiazide (PRINZIDE,ZESTORETIC) 20-12.5 MG per tablet, TAKE ONE TABLET BY MOUTH ONCE DAILY, Disp: 90 tablet, Rfl: 3  •  PARoxetine (PAXIL) 10 MG tablet, TAKE ONE TABLET BY MOUTH ONCE DAILY IN THE MORNING, Disp: 90 tablet, Rfl: 3  •  propranolol (INDERAL) 40 MG tablet, Take 1 tablet by mouth 2 (Two) Times a Day., Disp: 180 tablet, Rfl: 3     Objective     History of Present Illness     Review of Systems   Constitutional: Negative.    HENT: Negative.    Eyes: Negative.    Respiratory: Negative.    Cardiovascular: Positive for leg swelling.   Gastrointestinal: Negative.    Genitourinary: Negative.    Musculoskeletal: Negative.    Neurological: Positive for seizures.   Psychiatric/Behavioral: Negative.        Physical Exam   Constitutional: She is oriented to person, place, and time. She appears well-developed and well-nourished.   Pleasant, cooperative no distress, normal blood pressure   HENT:   Head: Normocephalic and atraumatic.   Eyes: Conjunctivae are normal. Pupils are equal,  round, and reactive to light. No scleral icterus.   Neck: Normal range of motion. Neck supple.   Cardiovascular: Normal rate, regular rhythm and normal heart sounds.   Pulmonary/Chest: Effort normal and breath sounds normal. No respiratory distress. She has no wheezes. She has no rales.   Musculoskeletal: Normal range of motion. She exhibits no edema.   Neurological: She is alert and oriented to person, place, and time.   Skin: Skin is warm and dry.   Psychiatric: She has a normal mood and affect. Her behavior is normal.   Nursing note and vitals reviewed.      ASSESSMENT  CBC is essentially normal.  CMP has an improved sugar of 103 and is otherwise essentially normal and hemoglobin A1c is acceptable at 5.8.  TSH was elevated but is now normal at 2.99 and free T4 is minimally elevated at 2.13  #1-hypertension, controlled  #2-hyperlipidemia reasonable control  #3-hypothyroidism, normal TSH was slightly elevated free T4, clinically euthyroid  #4-hyperglycemia with acceptable hemoglobin A1c, improved off steroids  #5-recently discovered meningioma, asymptomatic  #6-seizures, seizure free for 3 months on Keppra     Problem List Items Addressed This Visit     None          PLAN  it seems reasonable for the patient to be able to resume driving at this point since she's been seizure free for 3 months.  She will continue current medicines as now.  She does have significant edema in her right arm that may be related to prior mastectomy and some edema in her lower legs and I'm going to increase her Lasix to 40 mg daily.  I would like to recheck her in 3 months with a CBC, CMP, hemoglobin A1c, TSH and free T4 and lipid panel    There are no Patient Instructions on file for this visit.  No Follow-up on file.

## 2019-02-12 RX ORDER — LISINOPRIL AND HYDROCHLOROTHIAZIDE 20; 12.5 MG/1; MG/1
TABLET ORAL
Qty: 90 TABLET | Refills: 3 | Status: SHIPPED | OUTPATIENT
Start: 2019-02-12 | End: 2019-05-19 | Stop reason: HOSPADM

## 2019-03-05 RX ORDER — LEVOTHYROXINE SODIUM 0.07 MG/1
75 TABLET ORAL DAILY
Qty: 30 TABLET | Refills: 2 | Status: SHIPPED | OUTPATIENT
Start: 2019-03-05 | End: 2019-06-06 | Stop reason: SDUPTHER

## 2019-03-20 ENCOUNTER — OFFICE VISIT (OUTPATIENT)
Dept: FAMILY MEDICINE CLINIC | Facility: CLINIC | Age: 79
End: 2019-03-20

## 2019-03-20 VITALS
TEMPERATURE: 97 F | SYSTOLIC BLOOD PRESSURE: 120 MMHG | WEIGHT: 157 LBS | HEIGHT: 67 IN | BODY MASS INDEX: 24.64 KG/M2 | HEART RATE: 84 BPM | OXYGEN SATURATION: 98 % | RESPIRATION RATE: 16 BRPM | DIASTOLIC BLOOD PRESSURE: 64 MMHG

## 2019-03-20 DIAGNOSIS — R60.9 EDEMA, UNSPECIFIED TYPE: Primary | ICD-10-CM

## 2019-03-20 DIAGNOSIS — M79.89 SWELLING OF ARM: ICD-10-CM

## 2019-03-20 PROCEDURE — 99213 OFFICE O/P EST LOW 20 MIN: CPT | Performed by: INTERNAL MEDICINE

## 2019-03-20 NOTE — PROGRESS NOTES
Subjective   Karolina Ornelas is a 79 y.o. female. Patient is here today for follow-up on her significant right arm edema.  Additionally she has some mild numbness and tingling at times in her right thumb and second third fingers consistent with some mild carpal tunnel syndrome.  She does have some symmetrical edema in the lower legs that is not terribly significant.  She has a history of a mastectomy in the past on the right.  She is having no pain in the arms  Chief Complaint   Patient presents with   • Edema     right arm          Vitals:    03/20/19 1416   BP: 120/64   Pulse: 84   Resp: 16   Temp: 97 °F (36.1 °C)   SpO2: 98%     The following portions of the patient's history were reviewed and updated as appropriate: allergies, current medications, past family history, past medical history, past social history, past surgical history and problem list.    Past Medical History:   Diagnosis Date   • Arthritis    • Benign essential hypertension    • Breast cancer (CMS/HCC) 20 years ago    negative LN per patient- mastectomy and 5 years of tamoxifen   • Broken bones    • Disease of thyroid gland    • Hyperglycemia    • MVP (mitral valve prolapse)    • Osteoporosis    • Seizures (CMS/HCC)       Allergies   Allergen Reactions   • Cefaclor    • Shellfish-Derived Products Swelling      Social History     Socioeconomic History   • Marital status: Single     Spouse name: Not on file   • Number of children: Not on file   • Years of education: Not on file   • Highest education level: Not on file   Occupational History   • Occupation: retired teacher   Tobacco Use   • Smoking status: Never Smoker   • Smokeless tobacco: Never Used   Substance and Sexual Activity   • Alcohol use: No   • Drug use: No   • Sexual activity: Defer        Current Outpatient Medications:   •  alendronate (FOSAMAX) 70 MG tablet, Take 1 tablet by mouth Every 7 (Seven) Days., Disp: 12 tablet, Rfl: 3  •  atorvastatin (LIPITOR) 20 MG tablet, TAKE ONE TABLET BY  MOUTH ONCE DAILY, Disp: 90 tablet, Rfl: 3  •  Calcium Carbonate (CALTRATE 600 PO), Take  by mouth., Disp: , Rfl:   •  Cholecalciferol (VITAMIN D PO), Take  by mouth., Disp: , Rfl:   •  famotidine (PEPCID) 20 MG tablet, Take 20 mg by mouth 2 (Two) Times a Day., Disp: , Rfl:   •  furosemide (LASIX) 40 MG tablet, Take 1 tablet by mouth Daily., Disp: 30 tablet, Rfl: 5  •  levETIRAcetam (KEPPRA) 500 MG tablet, Take 1 tablet by mouth 2 (Two) Times a Day., Disp: 60 tablet, Rfl: 3  •  levothyroxine (SYNTHROID, LEVOTHROID) 75 MCG tablet, TAKE 1 TABLET BY MOUTH DAILY, Disp: 30 tablet, Rfl: 2  •  lisinopril-hydrochlorothiazide (PRINZIDE,ZESTORETIC) 20-12.5 MG per tablet, TAKE 1 TABLET BY MOUTH ONCE DAILY, Disp: 90 tablet, Rfl: 3  •  PARoxetine (PAXIL) 10 MG tablet, TAKE ONE TABLET BY MOUTH ONCE DAILY IN THE MORNING, Disp: 90 tablet, Rfl: 3  •  propranolol (INDERAL) 40 MG tablet, Take 1 tablet by mouth 2 (Two) Times a Day., Disp: 180 tablet, Rfl: 3     Objective     History of Present Illness     Review of Systems   Constitutional: Negative.    HENT: Negative.    Eyes: Negative.    Respiratory: Negative.    Cardiovascular: Positive for leg swelling.   Gastrointestinal: Negative.    Genitourinary: Negative.    Musculoskeletal:        Diffuse swelling of the right arm   Skin: Negative.    Neurological: Negative.    Psychiatric/Behavioral: Negative.        Physical Exam   Constitutional: She is oriented to person, place, and time. She appears well-developed and well-nourished.   Pleasant, cooperative in no distress   HENT:   Head: Normocephalic and atraumatic.   Eyes: Conjunctivae are normal. Pupils are equal, round, and reactive to light. No scleral icterus.   Neck: Normal range of motion. Neck supple.   Cardiovascular: Normal rate, regular rhythm and normal heart sounds.   Pulmonary/Chest: Effort normal and breath sounds normal. No respiratory distress. She has no wheezes. She has no rales.   Musculoskeletal: Normal range of  motion. She exhibits edema.   The right arm is significantly larger than the left generally with no palpable cords or erythema and no real tenderness.  Patient has mild dependent edema in both lower legs   Neurological: She is alert and oriented to person, place, and time.   Skin: Skin is warm and dry.   Psychiatric: She has a normal mood and affect. Her behavior is normal.   Nursing note and vitals reviewed.      ASSESSMENT patient has diffuse enlargement of the right arm with no areas of tenderness.  This could be a venous problem or could represent some lymphedema from her previous mastectomy.  She also has some mild dependent edema in the lower legs.     Problem List Items Addressed This Visit        Other    Edema - Primary    Relevant Orders    Duplex Venous Upper Extremity - Right CAR    Swelling of arm    Relevant Orders    Duplex Venous Upper Extremity - Right CAR          PLAN she will continue her furosemide and I have ordered a venous Doppler study of the right arm.  I would like to see the patient back 3 or 4 days following that to review the results.  She can try compression stockings and elevation for the leg edema    There are no Patient Instructions on file for this visit.  Return in about 2 weeks (around 4/3/2019).

## 2019-03-21 ENCOUNTER — HOSPITAL ENCOUNTER (OUTPATIENT)
Dept: MRI IMAGING | Facility: HOSPITAL | Age: 79
Discharge: HOME | End: 2019-03-21
Attending: NEUROLOGICAL SURGERY | Admitting: NEUROLOGICAL SURGERY

## 2019-03-21 DIAGNOSIS — R56.9 SEIZURE (HCC): ICD-10-CM

## 2019-03-21 DIAGNOSIS — D32.0 MENINGIOMA, CEREBRAL (HCC): ICD-10-CM

## 2019-03-21 PROCEDURE — A9577 INJ MULTIHANCE: HCPCS | Performed by: NEUROLOGICAL SURGERY

## 2019-03-21 PROCEDURE — 0 GADOBENATE DIMEGLUMINE 529 MG/ML SOLUTION: Performed by: NEUROLOGICAL SURGERY

## 2019-03-21 PROCEDURE — 70553 MRI BRAIN STEM W/O & W/DYE: CPT

## 2019-03-21 PROCEDURE — 82565 ASSAY OF CREATININE: CPT

## 2019-03-21 RX ADMIN — GADOBENATE DIMEGLUMINE 15 ML: 529 INJECTION, SOLUTION INTRAVENOUS at 15:07

## 2019-03-22 LAB — CREAT BLDA-MCNC: 0.7 MG/DL (ref 0.6–1.3)

## 2019-03-27 ENCOUNTER — OFFICE VISIT (OUTPATIENT)
Dept: NEUROSURGERY | Facility: CLINIC | Age: 79
End: 2019-03-27

## 2019-03-27 VITALS
SYSTOLIC BLOOD PRESSURE: 130 MMHG | HEART RATE: 64 BPM | DIASTOLIC BLOOD PRESSURE: 84 MMHG | HEIGHT: 67 IN | WEIGHT: 157 LBS | RESPIRATION RATE: 18 BRPM | BODY MASS INDEX: 24.64 KG/M2

## 2019-03-27 DIAGNOSIS — D32.9 MENINGIOMA (HCC): Primary | ICD-10-CM

## 2019-03-27 PROCEDURE — 99213 OFFICE O/P EST LOW 20 MIN: CPT | Performed by: NEUROLOGICAL SURGERY

## 2019-03-27 RX ORDER — LEVETIRACETAM 500 MG/1
500 TABLET ORAL 2 TIMES DAILY
Qty: 180 TABLET | Refills: 3 | Status: SHIPPED | OUTPATIENT
Start: 2019-03-27 | End: 2019-08-22 | Stop reason: SDUPTHER

## 2019-03-27 NOTE — PROGRESS NOTES
Subjective   Patient ID: Karolina Ornelas is a 79 y.o. female is here today for follow-up meningioma. Patient had MRI brain 3/21/19 and presents accompanied by her sister.     History of Present Illness 78 yo lady with known meningioma.  This is more or less stable.  No seizure.      The following portions of the patient's history were reviewed and updated as appropriate: allergies, current medications, past family history, past medical history, past social history, past surgical history and problem list.    Review of Systems   HENT: Negative for tinnitus.    Eyes: Negative for visual disturbance.   Musculoskeletal: Positive for gait problem (unchanged).   Neurological: Positive for numbness (right hand). Negative for dizziness, tremors, seizures, syncope, speech difficulty, weakness, light-headedness and headaches.   Psychiatric/Behavioral: Negative for confusion, decreased concentration and sleep disturbance.       Objective   Physical Exam  Neurologic Exam    4-6-5  No drift  Ft n intact  Face symmetric  Cn 2-12 intact    Assessment/Plan   Independent Review of Radiographic Studies:  I reviewed her MRI with her.     Medical Decision Making:  We discussed her tumor and r/b/a of resection.  We discussed the surgery in detail.  The alternative is to observe this.  I do not recommend this given her edema.  However the choice is , of course, hers to make. She does not want surgery.  We will see her in     Karolina was seen today for brain tumor.    Diagnoses and all orders for this visit:    Meningioma (CMS/HCC)  -     MRI Brain With & Without Contrast; Future    Other orders  -     levETIRAcetam (KEPPRA) 500 MG tablet; Take 1 tablet by mouth 2 (Two) Times a Day.      No Follow-up on file.

## 2019-03-28 ENCOUNTER — HOSPITAL ENCOUNTER (OUTPATIENT)
Dept: CARDIOLOGY | Facility: HOSPITAL | Age: 79
Discharge: HOME | End: 2019-03-28
Admitting: INTERNAL MEDICINE

## 2019-03-28 DIAGNOSIS — R60.9 EDEMA, UNSPECIFIED TYPE: ICD-10-CM

## 2019-03-28 DIAGNOSIS — M79.89 SWELLING OF ARM: ICD-10-CM

## 2019-03-28 LAB
BH CV UPPER VENOUS LEFT INTERNAL JUGULAR AUGMENT: NORMAL
BH CV UPPER VENOUS LEFT INTERNAL JUGULAR COMPETENT: NORMAL
BH CV UPPER VENOUS LEFT INTERNAL JUGULAR COMPRESS: NORMAL
BH CV UPPER VENOUS LEFT INTERNAL JUGULAR PHASIC: NORMAL
BH CV UPPER VENOUS LEFT INTERNAL JUGULAR SPONT: NORMAL
BH CV UPPER VENOUS LEFT SUBCLAVIAN AUGMENT: NORMAL
BH CV UPPER VENOUS LEFT SUBCLAVIAN COMPETENT: NORMAL
BH CV UPPER VENOUS LEFT SUBCLAVIAN COMPRESS: NORMAL
BH CV UPPER VENOUS LEFT SUBCLAVIAN PHASIC: NORMAL
BH CV UPPER VENOUS LEFT SUBCLAVIAN SPONT: NORMAL
BH CV UPPER VENOUS RIGHT AXILLARY AUGMENT: NORMAL
BH CV UPPER VENOUS RIGHT AXILLARY COMPETENT: NORMAL
BH CV UPPER VENOUS RIGHT AXILLARY COMPRESS: NORMAL
BH CV UPPER VENOUS RIGHT AXILLARY PHASIC: NORMAL
BH CV UPPER VENOUS RIGHT AXILLARY SPONT: NORMAL
BH CV UPPER VENOUS RIGHT BASILIC FOREARM COMPRESS: NORMAL
BH CV UPPER VENOUS RIGHT BASILIC UPPER COMPRESS: NORMAL
BH CV UPPER VENOUS RIGHT BRACHIAL COMPRESS: NORMAL
BH CV UPPER VENOUS RIGHT CEPHALIC FOREARM COMPRESS: NORMAL
BH CV UPPER VENOUS RIGHT CEPHALIC UPPER COMPRESS: NORMAL
BH CV UPPER VENOUS RIGHT INTERNAL JUGULAR AUGMENT: NORMAL
BH CV UPPER VENOUS RIGHT INTERNAL JUGULAR COMPETENT: NORMAL
BH CV UPPER VENOUS RIGHT INTERNAL JUGULAR COMPRESS: NORMAL
BH CV UPPER VENOUS RIGHT INTERNAL JUGULAR PHASIC: NORMAL
BH CV UPPER VENOUS RIGHT INTERNAL JUGULAR SPONT: NORMAL
BH CV UPPER VENOUS RIGHT RADIAL COMPRESS: NORMAL
BH CV UPPER VENOUS RIGHT SUBCLAVIAN AUGMENT: NORMAL
BH CV UPPER VENOUS RIGHT SUBCLAVIAN COMPETENT: NORMAL
BH CV UPPER VENOUS RIGHT SUBCLAVIAN COMPRESS: NORMAL
BH CV UPPER VENOUS RIGHT SUBCLAVIAN PHASIC: NORMAL
BH CV UPPER VENOUS RIGHT SUBCLAVIAN SPONT: NORMAL
BH CV UPPER VENOUS RIGHT ULNAR COMPRESS: NORMAL

## 2019-03-28 PROCEDURE — 93971 EXTREMITY STUDY: CPT

## 2019-03-28 RX ORDER — ATORVASTATIN CALCIUM 20 MG/1
TABLET, FILM COATED ORAL
Qty: 90 TABLET | Refills: 3 | Status: SHIPPED | OUTPATIENT
Start: 2019-03-28 | End: 2019-08-22 | Stop reason: SDUPTHER

## 2019-04-10 ENCOUNTER — HOSPITAL ENCOUNTER (OUTPATIENT)
Dept: GENERAL RADIOLOGY | Facility: HOSPITAL | Age: 79
Discharge: HOME | End: 2019-04-10
Admitting: INTERNAL MEDICINE

## 2019-04-10 ENCOUNTER — OFFICE VISIT (OUTPATIENT)
Dept: FAMILY MEDICINE CLINIC | Facility: CLINIC | Age: 79
End: 2019-04-10

## 2019-04-10 VITALS
OXYGEN SATURATION: 97 % | WEIGHT: 154 LBS | RESPIRATION RATE: 16 BRPM | DIASTOLIC BLOOD PRESSURE: 70 MMHG | TEMPERATURE: 97.1 F | BODY MASS INDEX: 24.17 KG/M2 | HEIGHT: 67 IN | SYSTOLIC BLOOD PRESSURE: 120 MMHG | HEART RATE: 73 BPM

## 2019-04-10 DIAGNOSIS — I89.0 LYMPHEDEMA OF ARM: ICD-10-CM

## 2019-04-10 DIAGNOSIS — D32.0 MENINGIOMA, CEREBRAL (HCC): ICD-10-CM

## 2019-04-10 DIAGNOSIS — Z85.3 HISTORY OF RIGHT BREAST CANCER: ICD-10-CM

## 2019-04-10 DIAGNOSIS — R60.9 EDEMA, UNSPECIFIED TYPE: ICD-10-CM

## 2019-04-10 DIAGNOSIS — R93.0 ABNORMAL CT OF THE HEAD: ICD-10-CM

## 2019-04-10 DIAGNOSIS — I10 BENIGN ESSENTIAL HYPERTENSION: ICD-10-CM

## 2019-04-10 DIAGNOSIS — H61.21 IMPACTED CERUMEN OF RIGHT EAR: ICD-10-CM

## 2019-04-10 DIAGNOSIS — I10 BENIGN ESSENTIAL HYPERTENSION: Primary | ICD-10-CM

## 2019-04-10 PROBLEM — C50.919 BREAST CANCER (HCC): Status: ACTIVE | Noted: 2019-04-10

## 2019-04-10 PROCEDURE — 99214 OFFICE O/P EST MOD 30 MIN: CPT | Performed by: INTERNAL MEDICINE

## 2019-04-10 PROCEDURE — 71046 X-RAY EXAM CHEST 2 VIEWS: CPT

## 2019-04-10 NOTE — PROGRESS NOTES
Subjective   Karolina Ornelas is a 79 y.o. female. Patient is here today for follow-up on her right arm swelling and dependent edema in both lower legs.  Overall the patient feels stable.  She did see the neurosurgeon recently and continues with a apparent meningioma in the right frontal area that is essentially unchanged.  Otherwise she is been stable.  She has no congestive heart failure symptoms.  There is been no change in her edema  Chief Complaint   Patient presents with   • Edema     right arm still swollen          Vitals:    04/10/19 1258   BP: 120/70   Pulse: 73   Resp: 16   Temp: 97.1 °F (36.2 °C)   SpO2: 97%     The following portions of the patient's history were reviewed and updated as appropriate: allergies, current medications, past family history, past medical history, past social history, past surgical history and problem list.    Past Medical History:   Diagnosis Date   • Arthritis    • Benign essential hypertension    • Breast cancer (CMS/HCC) 20 years ago    negative LN per patient- mastectomy and 5 years of tamoxifen   • Broken bones    • Disease of thyroid gland    • Hyperglycemia    • MVP (mitral valve prolapse)    • Osteoporosis    • Seizures (CMS/HCC)       Allergies   Allergen Reactions   • Cefaclor    • Shellfish-Derived Products Swelling      Social History     Socioeconomic History   • Marital status: Single     Spouse name: Not on file   • Number of children: Not on file   • Years of education: Not on file   • Highest education level: Not on file   Occupational History   • Occupation: retired teacher   Tobacco Use   • Smoking status: Never Smoker   • Smokeless tobacco: Never Used   Substance and Sexual Activity   • Alcohol use: No   • Drug use: No   • Sexual activity: Defer        Current Outpatient Medications:   •  alendronate (FOSAMAX) 70 MG tablet, Take 1 tablet by mouth Every 7 (Seven) Days., Disp: 12 tablet, Rfl: 3  •  atorvastatin (LIPITOR) 20 MG tablet, TAKE 1 TABLET BY MOUTH ONCE  DAILY, Disp: 90 tablet, Rfl: 3  •  Calcium Carbonate (CALTRATE 600 PO), Take  by mouth., Disp: , Rfl:   •  Cholecalciferol (VITAMIN D PO), Take  by mouth., Disp: , Rfl:   •  furosemide (LASIX) 40 MG tablet, Take 1 tablet by mouth Daily., Disp: 30 tablet, Rfl: 5  •  levETIRAcetam (KEPPRA) 500 MG tablet, Take 1 tablet by mouth 2 (Two) Times a Day., Disp: 180 tablet, Rfl: 3  •  levothyroxine (SYNTHROID, LEVOTHROID) 75 MCG tablet, TAKE 1 TABLET BY MOUTH DAILY, Disp: 30 tablet, Rfl: 2  •  lisinopril-hydrochlorothiazide (PRINZIDE,ZESTORETIC) 20-12.5 MG per tablet, TAKE 1 TABLET BY MOUTH ONCE DAILY, Disp: 90 tablet, Rfl: 3  •  PARoxetine (PAXIL) 10 MG tablet, TAKE ONE TABLET BY MOUTH ONCE DAILY IN THE MORNING, Disp: 90 tablet, Rfl: 3  •  propranolol (INDERAL) 40 MG tablet, Take 1 tablet by mouth 2 (Two) Times a Day., Disp: 180 tablet, Rfl: 3     Objective     History of Present Illness     Review of Systems   Constitutional: Negative.    HENT: Negative.    Eyes: Negative.    Respiratory: Negative.    Cardiovascular: Positive for leg swelling.   Gastrointestinal: Negative.    Genitourinary: Negative.    Musculoskeletal:        Diffuse swelling of the right arm   Skin: Negative.    Neurological: Negative.    Psychiatric/Behavioral: Negative.        Physical Exam   Constitutional: She is oriented to person, place, and time. She appears well-developed and well-nourished.   Pleasant, cooperative in no distress   HENT:   Head: Normocephalic and atraumatic.   The right auditory canal is completely obstructed by wax.   Eyes: Conjunctivae are normal. Pupils are equal, round, and reactive to light. No scleral icterus.   Neck: Normal range of motion.   Cardiovascular: Normal rate, regular rhythm and normal heart sounds.   Pulmonary/Chest: Effort normal and breath sounds normal. No respiratory distress. She has no wheezes. She has no rales.   Musculoskeletal: Normal range of motion. She exhibits edema.   Patient has diffuse swelling  of the right upper arm with no palpable clots.  I can appreciate no significant adenopathy in the right axilla.  Secondly she has dependent edema about 2-3+ in both lower legs   Neurological: She is alert and oriented to person, place, and time.   Skin: Skin is warm and dry.   Psychiatric: She has a normal mood and affect. Her behavior is normal.   Nursing note and vitals reviewed.          ASSESSMENT the patient has a remote history of right mastectomy for breast cancer and appears to have some lymphedema in the right arm.  Appreciate no axillary masses.  She also has dependent edema in both lower legs and I would like to make sure there is no sign of some abdominal or pelvic problem causing venous obstruction.  She also has a meningioma, probable, in the brain that seems to be relatively stable and is followed by neurosurgery  #2-right cerumen impaction that was successfully cleared with irrigation.  There is no ear trauma and the patient tolerated the procedure well and the tympanic membrane appeared normal and her hearing normalized     Problem List Items Addressed This Visit        Cardiovascular and Mediastinum    Benign essential hypertension - Primary    Relevant Orders    XR Chest PA & Lateral       Nervous and Auditory    Meningioma, cerebral (CMS/HCC)    Relevant Orders    XR Chest PA & Lateral       Other    Abnormal CT of the head    Relevant Orders    CT Abdomen Pelvis With & Without Contrast    Edema    Relevant Orders    CT Abdomen Pelvis With & Without Contrast    XR Chest PA & Lateral    Lymphedema of arm    Relevant Orders    CT Abdomen Pelvis With & Without Contrast    XR Chest PA & Lateral    Ambulatory Referral to Lymphedema Clinic    History of right breast cancer    Relevant Orders    CT Abdomen Pelvis With & Without Contrast    XR Chest PA & Lateral    Ambulatory Referral to Lymphedema Clinic          PLAN the patient will continue current medicines.  I am referring her to the lymphedema  clinic for her right arm swelling.  I have ordered a chest x-ray to rule out cardiomegaly and signs of CHF and have ordered a CT scan of the abdomen and pelvis to rule out any neoplasm causing venous obstruction.  She is already scheduled for follow-up with me in May.    There are no Patient Instructions on file for this visit.  No Follow-up on file.

## 2019-04-22 ENCOUNTER — HOSPITAL ENCOUNTER (OUTPATIENT)
Dept: CT IMAGING | Facility: HOSPITAL | Age: 79
Discharge: HOME | End: 2019-04-22
Admitting: INTERNAL MEDICINE

## 2019-04-22 DIAGNOSIS — I89.0 LYMPHEDEMA OF ARM: ICD-10-CM

## 2019-04-22 DIAGNOSIS — R93.0 ABNORMAL CT OF THE HEAD: ICD-10-CM

## 2019-04-22 DIAGNOSIS — R60.9 EDEMA, UNSPECIFIED TYPE: ICD-10-CM

## 2019-04-22 DIAGNOSIS — Z85.3 HISTORY OF RIGHT BREAST CANCER: ICD-10-CM

## 2019-04-22 LAB — CREAT BLDA-MCNC: 0.7 MG/DL (ref 0.6–1.3)

## 2019-04-22 PROCEDURE — 74177 CT ABD & PELVIS W/CONTRAST: CPT

## 2019-04-22 PROCEDURE — 25010000002 IOPAMIDOL 61 % SOLUTION: Performed by: INTERNAL MEDICINE

## 2019-04-22 PROCEDURE — 82565 ASSAY OF CREATININE: CPT

## 2019-04-22 RX ADMIN — IOPAMIDOL 85 ML: 612 INJECTION, SOLUTION INTRAVENOUS at 10:32

## 2019-04-24 ENCOUNTER — OFFICE VISIT (OUTPATIENT)
Dept: FAMILY MEDICINE CLINIC | Facility: CLINIC | Age: 79
End: 2019-04-24

## 2019-04-24 VITALS
DIASTOLIC BLOOD PRESSURE: 70 MMHG | HEART RATE: 74 BPM | SYSTOLIC BLOOD PRESSURE: 140 MMHG | OXYGEN SATURATION: 98 % | RESPIRATION RATE: 16 BRPM | WEIGHT: 152 LBS | HEIGHT: 67 IN | BODY MASS INDEX: 23.86 KG/M2

## 2019-04-24 DIAGNOSIS — N28.89 RENAL MASS, RIGHT: Primary | ICD-10-CM

## 2019-04-24 DIAGNOSIS — I10 BENIGN ESSENTIAL HYPERTENSION: ICD-10-CM

## 2019-04-24 DIAGNOSIS — R59.0 LYMPHADENOPATHY, ABDOMINAL: ICD-10-CM

## 2019-04-24 DIAGNOSIS — I89.0 LYMPHEDEMA OF ARM: ICD-10-CM

## 2019-04-24 DIAGNOSIS — Z85.3 HISTORY OF RIGHT BREAST CANCER: ICD-10-CM

## 2019-04-24 PROCEDURE — 99214 OFFICE O/P EST MOD 30 MIN: CPT | Performed by: INTERNAL MEDICINE

## 2019-04-24 NOTE — PROGRESS NOTES
Subjective   Karolina Ornelas is a 79 y.o. female. Patient is here today for follow-up on her leg edema and recent CT scan.  The patient stable and her edema is essentially unchanged and not really responsive to diuretics.  She generally feels okay and is accompanied by her sister.  She does have a history of breast cancer on the right, status post mastectomy and is having swelling in the right arm now consistent with lymphedema.  Chief Complaint   Patient presents with   • Results     ct scan          Vitals:    04/24/19 0831   BP: 140/70   Pulse: 74   Resp: 16   SpO2: 98%     The following portions of the patient's history were reviewed and updated as appropriate: allergies, current medications, past family history, past medical history, past social history, past surgical history and problem list.    Past Medical History:   Diagnosis Date   • Arthritis    • Benign essential hypertension    • Breast cancer (CMS/HCC) 20 years ago    negative LN per patient- mastectomy and 5 years of tamoxifen   • Broken bones    • Disease of thyroid gland    • Hyperglycemia    • MVP (mitral valve prolapse)    • Osteoporosis    • Seizures (CMS/HCC)       Allergies   Allergen Reactions   • Cefaclor    • Shellfish-Derived Products Swelling     shrimp      Social History     Socioeconomic History   • Marital status: Single     Spouse name: Not on file   • Number of children: Not on file   • Years of education: Not on file   • Highest education level: Not on file   Occupational History   • Occupation: retired teacher   Tobacco Use   • Smoking status: Never Smoker   • Smokeless tobacco: Never Used   Substance and Sexual Activity   • Alcohol use: No   • Drug use: No   • Sexual activity: Defer        Current Outpatient Medications:   •  alendronate (FOSAMAX) 70 MG tablet, Take 1 tablet by mouth Every 7 (Seven) Days., Disp: 12 tablet, Rfl: 3  •  atorvastatin (LIPITOR) 20 MG tablet, TAKE 1 TABLET BY MOUTH ONCE DAILY, Disp: 90 tablet, Rfl: 3  •   Calcium Carbonate (CALTRATE 600 PO), Take  by mouth., Disp: , Rfl:   •  Cholecalciferol (VITAMIN D PO), Take  by mouth., Disp: , Rfl:   •  furosemide (LASIX) 40 MG tablet, Take 1 tablet by mouth Daily., Disp: 30 tablet, Rfl: 5  •  levETIRAcetam (KEPPRA) 500 MG tablet, Take 1 tablet by mouth 2 (Two) Times a Day., Disp: 180 tablet, Rfl: 3  •  levothyroxine (SYNTHROID, LEVOTHROID) 75 MCG tablet, TAKE 1 TABLET BY MOUTH DAILY, Disp: 30 tablet, Rfl: 2  •  lisinopril-hydrochlorothiazide (PRINZIDE,ZESTORETIC) 20-12.5 MG per tablet, TAKE 1 TABLET BY MOUTH ONCE DAILY, Disp: 90 tablet, Rfl: 3  •  PARoxetine (PAXIL) 10 MG tablet, TAKE ONE TABLET BY MOUTH ONCE DAILY IN THE MORNING, Disp: 90 tablet, Rfl: 3  •  propranolol (INDERAL) 40 MG tablet, Take 1 tablet by mouth 2 (Two) Times a Day., Disp: 180 tablet, Rfl: 3     Objective     History of Present Illness     Review of Systems   Constitutional: Negative.    HENT: Negative.    Eyes: Negative.    Respiratory: Negative.    Cardiovascular: Positive for leg swelling.   Gastrointestinal: Negative.    Genitourinary: Negative.    Musculoskeletal: Negative.    Skin: Negative.    Neurological: Negative.    Psychiatric/Behavioral: Negative.        Physical Exam   Constitutional: She appears well-developed and well-nourished.   Pleasant, cooperative no distress   HENT:   Head: Normocephalic and atraumatic.   Eyes: Conjunctivae are normal. Pupils are equal, round, and reactive to light. No scleral icterus.   Neck: Normal range of motion. Neck supple.   Cardiovascular: Normal rate, regular rhythm and normal heart sounds.   Pulmonary/Chest: Effort normal and breath sounds normal. No respiratory distress. She has no wheezes. She has no rales.   Abdominal: Soft. Bowel sounds are normal.   Musculoskeletal: Normal range of motion. She exhibits edema.   Neurological: She is alert.   Skin: Skin is warm and dry.   Psychiatric: She has a normal mood and affect. Her behavior is normal.   Nursing note  and vitals reviewed.      ASSESSMENT the CT scan report was reviewed and is very worrisome.  There is a pathologically enlarged lymph node in the area of the portacaval system consistent with metastasis.  A mass was noted in the right kidney that may be benign in by urology evaluation was recommended.  An incidental uterine fibroid was noted.  #1-abnormal abdominal lymphadenopathy consistent with possible metastatic disease  #2-right renal mass  #3-hypertension  #4-meningioma in the brain, asymptomatic currently  #5-lymphedema in the right arm  #6-history of right mastectomy for breast cancer     Problem List Items Addressed This Visit        Cardiovascular and Mediastinum    Benign essential hypertension       Immune and Lymphatic    Lymphadenopathy, abdominal    Relevant Orders    Ambulatory Referral to Hematology / Oncology       Genitourinary    Renal mass, right - Primary    Relevant Orders    Ambulatory Referral to Hematology / Oncology    Ambulatory Referral to Urology       Other    Lymphedema of arm    Relevant Orders    Ambulatory Referral to Hematology / Oncology    History of right breast cancer    Relevant Orders    Ambulatory Referral to Hematology / Oncology          PLAN I am referring the patient to the [facility] group for their help and to urology because of the right renal mass.  I would like to recheck the patient in June with a CBC, CMP, hemoglobin A1c, TSH and free T4 and lipid panel    There are no Patient Instructions on file for this visit.  No Follow-up on file.

## 2019-04-25 DIAGNOSIS — N28.89 RENAL MASS, RIGHT: Primary | ICD-10-CM

## 2019-04-26 ENCOUNTER — HOSPITAL ENCOUNTER (OUTPATIENT)
Dept: OCCUPATIONAL THERAPY | Facility: HOSPITAL | Age: 79
Setting detail: THERAPIES SERIES
Discharge: HOME | End: 2019-04-26

## 2019-04-26 DIAGNOSIS — I97.2 POST-MASTECTOMY LYMPHEDEMA SYNDROME: Primary | ICD-10-CM

## 2019-04-26 DIAGNOSIS — Z85.3 HISTORY OF RIGHT BREAST CANCER: ICD-10-CM

## 2019-04-26 PROCEDURE — 97535 SELF CARE MNGMENT TRAINING: CPT

## 2019-04-26 PROCEDURE — 97165 OT EVAL LOW COMPLEX 30 MIN: CPT

## 2019-04-27 NOTE — THERAPY EVALUATION
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   • Hyperglycemia   • Lymphedema of arm   • History of right breast cancer   • Renal mass, right   • Lymphadenopathy, abdominal        Past Medical History:   Diagnosis Date   • Arthritis    • Benign essential hypertension    • Breast cancer (CMS/HCC) 20 years ago    negative LN per patient- mastectomy and 5 years of tamoxifen   • Broken bones    • Disease of thyroid gland    • Hyperglycemia    • MVP (mitral valve prolapse)    • Osteoporosis    • Seizures (CMS/HCC)         Past Surgical History:   Procedure Laterality Date   • BILATERAL OOPHORECTOMY     • MASTECTOMY     • SKIN LESION EXCISION           Visit Dx:     ICD-10-CM ICD-9-CM   1. 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 History of right breast cancer Z85.3 V10.3       Patient History     Row Name 19 1200             History    Chief Complaint  Swelling;Tightness  -RE      Date Current Problem(s) Began  -- 2 months ago  -RE      Brief Description of Current Complaint  Patient presents with c/o swelling in her R UE which started about 2 months ago.  Elevation has not affected it.  She is currently under further medical evaluation for bilateral LE edema.   -RE      Patient/Caregiver Goals  Know what to do to help the symptoms;Decrease swelling  -RE      How has patient tried to help current problem?  elevation  -RE      What clinical tests have you had for this problem?  Other 1  (comment) doppler  -RE      Results of Clinical Tests  negative  -RE      Are you or can you be pregnant  No  -RE         Pain     Pain at Present  0  -RE      Pain at Best  0  -RE      Pain at Worst  0  -RE         Fall Risk Assessment    Any falls in the past year:  Yes  -RE      Factors that contributed to the fall:  Tripped;Other (comment) seizure  -RE         Services    Prior Rehab/Home Health Experiences  Yes  -RE      Are you currently receiving Home Health services  No  -RE         Daily Activities    Primary Language  English  -RE      Are you able to read  Yes  -RE      Are you able to write  Yes  -RE      How does patient learn best?  Listening;Reading  -RE      Teaching needs identified  Home Exercise Program;Management of Condition  -RE      Patient is concerned about/has problems with  Repetitive movements of the hand, arm, shoulder  -RE      Does patient have problems with the following?  None  -RE      Barriers to learning  None  -RE      Pt Participated in POC and Goals  Yes  -RE         Safety    Are you being hurt, hit, or frightened by anyone at home or in your life?  No  -RE      Are you being neglected by a caregiver  No  -RE        User Key  (r) = Recorded By, (t) = Taken By, (c) = Cosigned By    Initials Name Provider Type    RE Poornima Middleton OTR Occupational Therapist          Lymphedema     Row Name 04/26/19 1200             Subjective Pain    Able to rate subjective pain?  yes  -RE      Pre-Treatment Pain Level  0  -RE      Post-Treatment Pain Level  0  -RE         Lymphedema Assessment    Lymphedema Classification  RUE:;secondary;stage 2 (Spontaneously Irreversible)  -RE      Lymphedema Cancer Related Sx  right;axillary dissection;modified radical mastectomy  -RE      Lymph Nodes Removed #  20  -RE      Positive Lymph Nodes #  0  -RE      Stage of Cancer  Stage II  -RE      Chemo Received  no  -RE      Radiation Therapy Received  no  -RE      Infections or Cellulitis?  no  -RE          General ROM    GENERAL ROM COMMENTS  AROM is WFL   -RE         MMT (Manual Muscle Testing)    General MMT Comments  Strength is 4+/5  -RE         Lymphedema Edema Assessment    Ptting Edema Category  By grade out of 4  -RE      Pitting Edema  + 3/4;Moderate;Severe  -RE      Edema Assessment Comment  fingers to axilla  -RE         Skin Changes/Observations    Skin Observations Comment  Skin is intact and slightly red  -RE         Lymphedema Sensation    Lymphedema Sensation Reports  RUE:;tingling;numbness  -RE      Lymphedema Sensation Comments  PHX of CTS  -RE         Lymphedema Measurements    Measurement Type(s)  Circumferential  -RE      Circumferential Areas  Upper extremities  -RE         BUE Circumferential (cm)    Measurement Location 1  Axilla  -RE      Left 1  26 cm  -RE      Right 1  28.5 cm  -RE      Measurement Location 2  15 cm above elbow  -RE      Left 2  25.5 cm  -RE      Right 2  26.5 cm  -RE      Measurement Location 3  10 cm above elbow  -RE      Left 3  24 cm  -RE      Right 3  27.5 cm  -RE      Measurement Location 4  5 cm above elbow  -RE      Left 4  22 cm  -RE      Right 4  29.5 cm  -RE      Measurement Location 5  Elbow  -RE      Left 5  22.5 cm  -RE      Right 5  28 cm  -RE      Measurement Location 6  5 cm below elbow  -RE      Left 6  22.5 cm  -RE      Right 6  28.5 cm  -RE      Measurement Location 7  10 cm below elbow  -RE      Left 7  19.8 cm  -RE      Right 7  27.5 cm  -RE      Measurement Location 8  15 cm below elbow  -RE      Left 8  16.5 cm  -RE      Right 8  24.4 cm  -RE      Measurement Location 9  20 cm below elbow  -RE      Left 9  14.5 cm  -RE      Right 9  20.8 cm  -RE      Measurement Location 10  Wrist  -RE      Left 10  14.8 cm  -RE      Right 10  18.8 cm  -RE      Measurement Location 11  Mid palm  -RE      Left 11  17 cm  -RE      Right 11  19.5 cm  -RE      LUE Circumferential Total  225.1 cm  -RE      RUE Circumferential Total  279.5 cm  -RE        User Key  (r) =  Recorded By, (t) = Taken By, (c) = Cosigned By    Initials Name Provider Type    Poornima Torres, OTR Occupational Therapist                  Therapy Education  Education Details: Discussed program and plan.  Gave bandage order form.   How Provided: Verbal, Written  Provided to: Patient, Caregiver  Level of Understanding: Teach back education performed, Verbalized  93816 - OT Self Care/Mgmt Minutes: 15        OT Goals     Row Name 04/26/19 1900          OT Short Term Goals    STG Date to Achieve  05/10/19  -RE     STG 1  Patient will demonstrate proper awareness of “What is Lymphedema?” and “Healthy Habits” for improved prevention, management, care of symptoms and ease of transition to self-care of condition.   -RE     STG 1 Progress  New  -RE     STG 2  Patient independent and compliant with self-wrapping techniques of compression bandages with family member as indicated for improved self-management of condition.  -RE     STG 2 Progress  New  -RE     STG 3  Patient will demonstrate decreased net edema of right upper extremity >/= 10-20 cm for decrease in symptoms, decreased risk of infection and improved skin care/transition to self-care of condition.   -RE     STG 3 Progress  New  -RE        Long Term Goals    LTG Date to Achieve  05/24/19  -RE     LTG 1  Patient will demonstrate decreased net edema of right upper extremity >/= 21-30 cm for decrease in symptoms, decreased risk of infection and improved skin care/transition to self-care of condition.   -RE     LTG 1 Progress  New  -RE     LTG 2  Patient independent and compliant with initial home exercise program focused on diaphragmatic breathing, range of motion, flexibility to decrease edema and improve lymphatic flow for decreased edema and decreased risk of infection.   -RE     LTG 2 Progress  New  -RE     LTG 3  Patient independent and compliant with compression garments and night compression as indicated for self-management of edema.   -RE     LTG 3  Progress  New  -RE        Time Calculation    OT Goal Re-Cert Due Date  07/26/19  -RE       User Key  (r) = Recorded By, (t) = Taken By, (c) = Cosigned By    Initials Name Provider Type    Poornima Torres OTR Occupational Therapist          OT Assessment/Plan     Row Name 04/26/19 1953          OT Assessment    Impairments  Edema;Impaired lymphatic circulation  -RE     Assessment Comments  Patient presents with signs and symptoms consistent with R UE post mastectomy lymphedema.  The total circumference of the R UE  is 54.4 cm greater than  The  L UE.  Edema is 3+ and extends from fingers to axilla.  She would benefit from Complete Decongestive Therapy to decrease edema, Protect skin integrity, decrease the risk of and infection and to learn self care strategies.    -RE     Please refer to paper survey for additional self-reported information  Yes  -RE     OT Diagnosis  post mastectomy lymphedema  -RE     OT Rehab Potential  Good  -RE     Patient/caregiver participated in establishment of treatment plan and goals  Yes  -RE     Patient would benefit from skilled therapy intervention  Yes  -RE        OT Plan    OT Frequency  3x/week  -RE     Predicted Duration of Therapy Intervention (Therapy Eval)  4 weeks   -RE     Planned CPT's?  OT EVAL LOW COMPLEXITY: 90968;OT THER ACT EA 15 MIN: 44314HP;OT THER PROC EA 15 MIN: 35989EO;OT SELF CARE/MGMT/TRAIN 15 MIN: 22518;OT MANUAL THERAPY EA 15 MIN: 33814  -RE     Planned Therapy Interventions (Optional Details)  home exercise program;manual therapy techniques;patient/family education;other (see comments) compression bandaging and skin care  -RE       User Key  (r) = Recorded By, (t) = Taken By, (c) = Cosigned By    Initials Name Provider Type    Poornima Torres OTR Occupational Therapist                    Time Calculation:   OT Start Time: 1130  OT Stop Time: 1230  OT Time Calculation (min): 60 min  Total Timed Code Minutes- OT: 15 minute(s)     Therapy Charges for Today      Code Description Service Date Service Provider Modifiers Qty    64341415215 HC OT SELF CARE/MGMT/TRAIN EA 15 MIN 4/26/2019 Poornima Middleton OTR GO 1    [id number]  OT EVAL LOW COMPLEXITY 3 4/26/2019 Poornima Middleton OTR GO 1                    SURY Mae  4/26/2019

## 2019-04-29 DIAGNOSIS — R59.0 LYMPHADENOPATHY, ABDOMINAL: Primary | ICD-10-CM

## 2019-05-07 ENCOUNTER — HOSPITAL ENCOUNTER (OUTPATIENT)
Dept: OCCUPATIONAL THERAPY | Facility: HOSPITAL | Age: 79
Setting detail: THERAPIES SERIES
Discharge: HOME | End: 2019-05-07

## 2019-05-07 DIAGNOSIS — I97.2 POST-MASTECTOMY LYMPHEDEMA SYNDROME: Primary | ICD-10-CM

## 2019-05-07 PROCEDURE — 97535 SELF CARE MNGMENT TRAINING: CPT

## 2019-05-07 PROCEDURE — 97140 MANUAL THERAPY 1/> REGIONS: CPT

## 2019-05-07 NOTE — THERAPY TREATMENT NOTE
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   • Hyperglycemia   • Lymphedema of arm   • History of right breast cancer   • Renal mass, right   • Lymphadenopathy, abdominal        Past Medical History:   Diagnosis Date   • Arthritis    • Benign essential hypertension    • Breast cancer (CMS/HCC)     negative LN per patient- mastectomy and 5 years of tamoxifen   • Broken bones    • Disease of thyroid gland    • Hyperglycemia    • Lymphadenopathy    • MVP (mitral valve prolapse)    • Osteoporosis    • Seizures (CMS/HCC)    • Skin cancer         Past Surgical History:   Procedure Laterality Date   • BILATERAL OOPHORECTOMY     • MASTECTOMY Right 1998   • SKIN LESION EXCISION           Visit Dx:      ICD-10-CM ICD-9-CM   1. 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  yes  -RE      Pre-Treatment Pain Level  0  -RE      Post-Treatment Pain Level  0  -RE         Subjective Comments    Subjective Comments  Continues to c/o numbness and tingling in her R hand  -RE         Manual Lymphatic Drainage    Manual Lymphatic Drainage  initial sequence;opened regional lymph nodes;opened anastamoses;extremity treatment  -RE      Initial Sequence  short neck;cervical;supraclavicular  -RE      Cervical  right;left  -RE      Supraclavicular  right;left  -RE      Opened Regional Lymph Nodes   axillary;inguinal  -RE      Axillary  left  -RE      Inguinal  right  -RE      Opened Anastamoses  anterior axillo-axillary;axillo-inguinal  -RE      Axillo-Inguinal  right  -RE      Extremity Treatment  MLD to full limb  -RE         Compression/Skin Care    Compression/Skin Care  skin care;wrapping location;bandaging  -RE      Skin Care  moisturizing lotion applied  -RE      Wrapping Location  upper extremity  -RE      Wrapping Location UE  right:;fingers to axilla  -RE      Bandage Layers  cotton liner;padding/fluff layer;short-stretch bandages (comment size/quantity)  -RE      Bandaging Comments  tG 7, 1 1/2 Artiflex, 1-6cm,1-8cm and 1-10cm Rosidal k and 1 Mollelast to fingers  -RE      Bandaging Technique  circumferential/spiral;light compression  -RE         L-Dex Bioimpedence Screening    L-Dex Measurement Extremity  RUE  -RE        User Key  (r) = Recorded By, (t) = Taken By, (c) = Cosigned By    Initials Name Provider Type    Poornima Torres OTR Occupational Therapist                  OT Assessment/Plan     Row Name 05/07/19 1102          OT Assessment    Assessment Comments  Tolerated MLD well and good understanding of bandaging.  -RE        OT Plan    OT Plan Comments  continue  -RE       User Key  (r) = Recorded By, (t) = Taken By, (c) = Cosigned By    Initials Name Provider Type    Poornima Torres OTR Occupational Therapist                 Manual Rx (last 36 hours)      Manual Treatments     Row Name 05/07/19 1104             Total Minutes    51227 - OT Manual Therapy Minutes  60  -RE        User Key  (r) = Recorded By, (t) = Taken By, (c) = Cosigned By    Initials Name Provider Type    Poornima Torres OTR Occupational Therapist            Therapy Education  Education Details: Instructed patient and and sister in bandaging.  Gave written information and answered questions.   Given: Bandaging/dressing change  Program: New  How Provided: Verbal, Demonstration, Written  Provided to: Patient,  Caregiver  Level of Understanding: Teach back education performed, Verbalized  37591 - OT Self Care/Mgmt Minutes: 15                Time Calculation:   OT Start Time: 0915  OT Stop Time: 1030  OT Time Calculation (min): 75 min  Total Timed Code Minutes- OT: 75 minute(s)     Therapy Charges for Today     Code Description Service Date Service Provider Modifiers Qty    32040465555 HC OT SELF CARE/MGMT/TRAIN EA 15 MIN 5/7/2019 Poornima Middleton OTR GO 1    05399074693 HC OT MANUAL THERAPY EA 15 MIN 5/7/2019 Poornima Middleton OTR GO 4                      SURY Mae  5/7/2019

## 2019-05-08 ENCOUNTER — CONSULT (OUTPATIENT)
Dept: ONCOLOGY | Facility: CLINIC | Age: 79
End: 2019-05-08

## 2019-05-08 ENCOUNTER — LAB (OUTPATIENT)
Dept: OTHER | Facility: HOSPITAL | Age: 79
End: 2019-05-08

## 2019-05-08 VITALS
DIASTOLIC BLOOD PRESSURE: 79 MMHG | WEIGHT: 153.9 LBS | HEART RATE: 70 BPM | SYSTOLIC BLOOD PRESSURE: 134 MMHG | TEMPERATURE: 98.2 F | HEIGHT: 65 IN | RESPIRATION RATE: 16 BRPM | BODY MASS INDEX: 25.64 KG/M2 | OXYGEN SATURATION: 94 %

## 2019-05-08 DIAGNOSIS — C79.9 METASTATIC CANCER (HCC): Primary | ICD-10-CM

## 2019-05-08 DIAGNOSIS — Z12.39 SCREENING FOR BREAST CANCER: ICD-10-CM

## 2019-05-08 DIAGNOSIS — C25.9 PANCREATIC ADENOCARCINOMA (HCC): ICD-10-CM

## 2019-05-08 DIAGNOSIS — C18.8 OVERLAPPING MALIGNANT NEOPLASM OF COLON (HCC): ICD-10-CM

## 2019-05-08 DIAGNOSIS — C79.9 METASTATIC CANCER (HCC): ICD-10-CM

## 2019-05-08 DIAGNOSIS — R59.1 LYMPHADENOPATHY: ICD-10-CM

## 2019-05-08 DIAGNOSIS — R59.0 LYMPHADENOPATHY, ABDOMINAL: Primary | ICD-10-CM

## 2019-05-08 DIAGNOSIS — C50.111 MALIGNANT NEOPLASM OF CENTRAL PORTION OF RIGHT FEMALE BREAST, UNSPECIFIED ESTROGEN RECEPTOR STATUS (HCC): ICD-10-CM

## 2019-05-08 LAB
ALBUMIN SERPL-MCNC: 3.8 G/DL (ref 3.5–5.2)
ALBUMIN/GLOB SERPL: 1.4 G/DL
ALP SERPL-CCNC: 77 U/L (ref 39–117)
ALT SERPL W P-5'-P-CCNC: 12 U/L (ref 1–33)
ANION GAP SERPL CALCULATED.3IONS-SCNC: 10.6 MMOL/L
AST SERPL-CCNC: 12 U/L (ref 1–32)
BASOPHILS # BLD AUTO: 0.01 10*3/MM3 (ref 0–0.2)
BASOPHILS NFR BLD AUTO: 0.2 % (ref 0–1.5)
BILIRUB SERPL-MCNC: 0.6 MG/DL (ref 0.1–1.2)
BUN BLD-MCNC: 20 MG/DL (ref 8–23)
BUN/CREAT SERPL: 32.3 (ref 7–25)
CALCIUM SPEC-SCNC: 10.1 MG/DL (ref 8.6–10.5)
CANCER AG15-3 SERPL-ACNC: 15.8 U/ML
CANCER AG19-9 SERPL-ACNC: 11 U/ML
CEA SERPL-MCNC: 1.43 NG/ML
CHLORIDE SERPL-SCNC: 104 MMOL/L (ref 98–107)
CO2 SERPL-SCNC: 29.4 MMOL/L (ref 22–29)
CREAT BLD-MCNC: 0.62 MG/DL (ref 0.57–1)
DEPRECATED RDW RBC AUTO: 43.8 FL (ref 37–54)
EOSINOPHIL # BLD AUTO: 0.04 10*3/MM3 (ref 0–0.4)
EOSINOPHIL NFR BLD AUTO: 0.8 % (ref 0.3–6.2)
ERYTHROCYTE [DISTWIDTH] IN BLOOD BY AUTOMATED COUNT: 13.6 % (ref 12.3–15.4)
FERRITIN SERPL-MCNC: 111.5 NG/ML (ref 13–150)
GFR SERPL CREATININE-BSD FRML MDRD: 92 ML/MIN/1.73
GLOBULIN UR ELPH-MCNC: 2.7 GM/DL
GLUCOSE BLD-MCNC: 106 MG/DL (ref 65–99)
HCT VFR BLD AUTO: 38.8 % (ref 34–46.6)
HGB BLD-MCNC: 12.6 G/DL (ref 12–15.9)
HGB RETIC QN AUTO: 27.9 PG (ref 29.8–36.1)
IMM GRANULOCYTES # BLD AUTO: 0.04 10*3/MM3 (ref 0–0.05)
IMM GRANULOCYTES NFR BLD AUTO: 0.8 % (ref 0–0.5)
IMM RETICS NFR: 9.6 % (ref 3–15.8)
IRON 24H UR-MRATE: 31 MCG/DL (ref 37–145)
IRON SATN MFR SERPL: 11 % (ref 20–50)
LDH SERPL-CCNC: 199 U/L (ref 135–214)
LYMPHOCYTES # BLD AUTO: 0.84 10*3/MM3 (ref 0.7–3.1)
LYMPHOCYTES NFR BLD AUTO: 17.8 % (ref 19.6–45.3)
MCH RBC QN AUTO: 28.6 PG (ref 26.6–33)
MCHC RBC AUTO-ENTMCNC: 32.5 G/DL (ref 31.5–35.7)
MCV RBC AUTO: 88 FL (ref 79–97)
MONOCYTES # BLD AUTO: 0.67 10*3/MM3 (ref 0.1–0.9)
MONOCYTES NFR BLD AUTO: 14.2 % (ref 5–12)
NEUTROPHILS # BLD AUTO: 3.11 10*3/MM3 (ref 1.7–7)
NEUTROPHILS NFR BLD AUTO: 66.2 % (ref 42.7–76)
NRBC BLD AUTO-RTO: 0 /100 WBC (ref 0–0.2)
PLATELET # BLD AUTO: 113 10*3/MM3 (ref 140–450)
PMV BLD AUTO: 13 FL (ref 6–12)
POTASSIUM BLD-SCNC: 3.4 MMOL/L (ref 3.5–5.2)
PROT SERPL-MCNC: 6.5 G/DL (ref 6–8.5)
RBC # BLD AUTO: 4.41 10*6/MM3 (ref 3.77–5.28)
RETICS/RBC NFR AUTO: 0.83 % (ref 0.7–1.9)
SODIUM BLD-SCNC: 144 MMOL/L (ref 136–145)
TIBC SERPL-MCNC: 285 MCG/DL (ref 298–536)
TRANSFERRIN SERPL-MCNC: 191 MG/DL (ref 200–360)
VIT B12 BLD-MCNC: 447 PG/ML (ref 211–946)
WBC NRBC COR # BLD: 4.71 10*3/MM3 (ref 3.4–10.8)

## 2019-05-08 PROCEDURE — 83540 ASSAY OF IRON: CPT | Performed by: INTERNAL MEDICINE

## 2019-05-08 PROCEDURE — 86301 IMMUNOASSAY TUMOR CA 19-9: CPT | Performed by: INTERNAL MEDICINE

## 2019-05-08 PROCEDURE — 85025 COMPLETE CBC W/AUTO DIFF WBC: CPT | Performed by: INTERNAL MEDICINE

## 2019-05-08 PROCEDURE — 82747 ASSAY OF FOLIC ACID RBC: CPT | Performed by: INTERNAL MEDICINE

## 2019-05-08 PROCEDURE — 99205 OFFICE O/P NEW HI 60 MIN: CPT | Performed by: INTERNAL MEDICINE

## 2019-05-08 PROCEDURE — 86300 IMMUNOASSAY TUMOR CA 15-3: CPT | Performed by: INTERNAL MEDICINE

## 2019-05-08 PROCEDURE — 82728 ASSAY OF FERRITIN: CPT | Performed by: INTERNAL MEDICINE

## 2019-05-08 PROCEDURE — 82378 CARCINOEMBRYONIC ANTIGEN: CPT | Performed by: INTERNAL MEDICINE

## 2019-05-08 PROCEDURE — 36415 COLL VENOUS BLD VENIPUNCTURE: CPT

## 2019-05-08 PROCEDURE — 83615 LACTATE (LD) (LDH) ENZYME: CPT | Performed by: INTERNAL MEDICINE

## 2019-05-08 PROCEDURE — 82607 VITAMIN B-12: CPT | Performed by: INTERNAL MEDICINE

## 2019-05-08 PROCEDURE — 84466 ASSAY OF TRANSFERRIN: CPT | Performed by: INTERNAL MEDICINE

## 2019-05-08 PROCEDURE — 85046 RETICYTE/HGB CONCENTRATE: CPT | Performed by: INTERNAL MEDICINE

## 2019-05-08 PROCEDURE — 85014 HEMATOCRIT: CPT | Performed by: INTERNAL MEDICINE

## 2019-05-08 PROCEDURE — 83921 ORGANIC ACID SINGLE QUANT: CPT | Performed by: INTERNAL MEDICINE

## 2019-05-08 PROCEDURE — 80053 COMPREHEN METABOLIC PANEL: CPT | Performed by: INTERNAL MEDICINE

## 2019-05-08 RX ORDER — CEPHALEXIN 500 MG/1
500 CAPSULE ORAL AS NEEDED
COMMUNITY
End: 2019-05-19 | Stop reason: HOSPADM

## 2019-05-08 NOTE — PROGRESS NOTES
CT guided biopsy of portacaval lymph node with tissue pathology and flow cytometry labs V.O. Dr. Elizabeth

## 2019-05-08 NOTE — PROGRESS NOTES
Subjective     REASON FOR CONSULTATION: 1.  History of breast cancer in 1988 status post surgery by Dr. Joseph Mera and tamoxifen for 5 years    2.  New onset portacaval lymphadenopathy  Provide an opinion on any further workup or treatment                             REQUESTING PHYSICIAN: Dr. Mak Fong     RECORDS OBTAINED:  Records of the patients history including those obtained from the referring provider were reviewed and summarized in detail.    HISTORY OF PRESENT ILLNESS:  The patient is a 79 y.o. year old female who is here for an opinion about the above issue.    History of Present Illness patient is a 79-year-old female with history of breast cancer in 1998 status post right mastectomy with axillary dissection, underwent tamoxifen for 5 years.  She had 25 lymph nodes removed and were negative.  She has done well up until recently she went to see her primary care physician Dr.Walter Fong.  She had right upper extremity lymphedema for the last 2 months and a Doppler ultrasound of the right upper extremity was negative for any DVT.  Patient then has been sent to lymphedema clinic.  She also has bilateral lower extremity edema for quite some time.  As a result CT abdomen pelvis was done by Dr. Mak Matta and it showed a 3.5 cm portacaval lymph node.  She also had a right renal mass which was 4.3 cm which is thought to be slightly solid and she was referred to Dr. Roberts who has seen her and felt he will follow with observation with repeat CT scan.  I have reviewed the chest x-ray as well which is negative except cardiomegaly.  Patient was seen in the past by Dr. Graves and echocardiogram in November 2018 had shown valvular heart disease.  Currently her chest x-ray does show cardiomegaly and she does have shortness of breath with walking.  She does walk with a walker.    She denies any weight loss, no chest pain but does have shortness of breath.  She denies any active GI bleeding.  She has  had colonoscopy in the past 5 years ago which was showing a polyp and she was repeat colonoscopy in 5 years which she is overdue now.  This was done by Dr. King in the past.  She is also overdue for her left breast mammogram.  She does not have any fever or night sweats.  She has not lost weight.  But she does feel fatigued.      She has intermittent thrombocytopenia.  She has a mild cough but no evidence of active infection right now.    She also has a history of meningioma that is followed by Dr. Oliveira.  She underwent a recent MRI of the brain and I have reviewed that.  MRI of the brain shows a heterogeneous mass overlying the right frontal lobe superolaterally with associated dural tail enhancement.  The mass is 3 cm.  The most posterior nonenhancing or necrotic component has decreased in size slightly from 11.6 mm to 8.6 mm.  Dr. Oliveira wanted to operate on her because she has been complaining of some drowsiness.  Patient does not want to go through surgery on the brain right now.  Patient states that the drowsiness started only when she started Keppra for her seizures 3 months ago.  She likely will require referral to neurology if her drowsiness gets worse.  Clinically she does not look like she has any neurological symptoms.    Past Medical History:   Diagnosis Date   • Arthritis    • Benign essential hypertension    • Breast cancer (CMS/HCC) 1998    negative LN per patient- mastectomy and 5 years of tamoxifen   • Broken bones    • Disease of thyroid gland    • Hyperglycemia    • Lymphadenopathy    • MVP (mitral valve prolapse)    • Osteoporosis    • Seizures (CMS/HCC)    • Skin cancer 2007        Past Surgical History:   Procedure Laterality Date   • BILATERAL OOPHORECTOMY  1999   • MASTECTOMY Right 07/1998   • SKIN LESION EXCISION  2007        Current Outpatient Medications on File Prior to Visit   Medication Sig Dispense Refill   • alendronate (FOSAMAX) 70 MG tablet Take 1 tablet by mouth Every 7 (Seven)  Days. 12 tablet 3   • atorvastatin (LIPITOR) 20 MG tablet TAKE 1 TABLET BY MOUTH ONCE DAILY 90 tablet 3   • Calcium Carbonate (CALTRATE 600 PO) Take  by mouth.     • cephalexin (KEFLEX) 500 MG capsule Take 500 mg by mouth As Needed. Prior to dental work     • Cholecalciferol (VITAMIN D PO) Take  by mouth.     • guaiFENesin (MUCINEX PO) Take  by mouth.     • Ibuprofen (ADVIL PO) Take 200 mg by mouth As Needed.     • levETIRAcetam (KEPPRA) 500 MG tablet Take 1 tablet by mouth 2 (Two) Times a Day. 180 tablet 3   • levothyroxine (SYNTHROID, LEVOTHROID) 75 MCG tablet TAKE 1 TABLET BY MOUTH DAILY 30 tablet 2   • lisinopril-hydrochlorothiazide (PRINZIDE,ZESTORETIC) 20-12.5 MG per tablet TAKE 1 TABLET BY MOUTH ONCE DAILY 90 tablet 3   • PARoxetine (PAXIL) 10 MG tablet TAKE ONE TABLET BY MOUTH ONCE DAILY IN THE MORNING 90 tablet 3   • propranolol (INDERAL) 40 MG tablet Take 1 tablet by mouth 2 (Two) Times a Day. 180 tablet 3   • furosemide (LASIX) 40 MG tablet Take 1 tablet by mouth Daily. 30 tablet 5     No current facility-administered medications on file prior to visit.         ALLERGIES:    Allergies   Allergen Reactions   • Cefaclor    • Shellfish-Derived Products Swelling     shrimp        Social History     Socioeconomic History   • Marital status: Single     Spouse name: Not on file   • Number of children: Not on file   • Years of education: College   • Highest education level: Not on file   Occupational History   • Occupation: Teacher     Employer: RETIRED   Tobacco Use   • Smoking status: Never Smoker   • Smokeless tobacco: Never Used   Substance and Sexual Activity   • Alcohol use: No   • Drug use: No   • Sexual activity: Defer        Family History   Problem Relation Age of Onset   • Cancer Mother    • Cancer Maternal Aunt    • Stroke Father    • Stroke Sister         Review of Systems   Constitutional: Negative for appetite change, chills, diaphoresis, fatigue, fever and unexpected weight change.   HENT:  "Negative for hearing loss, sore throat and trouble swallowing.    Respiratory: Negative for cough, chest tightness, shortness of breath and wheezing.    Cardiovascular: Negative for chest pain, palpitations and leg swelling.   Gastrointestinal: Negative for abdominal distention, abdominal pain, constipation, diarrhea, nausea and vomiting.   Genitourinary: Negative for dysuria, frequency, hematuria and urgency.   Musculoskeletal: Negative for joint swelling.        No muscle weakness.   Skin: Negative for rash and wound.   Neurological: Negative for seizures, syncope, speech difficulty, weakness, numbness and headaches.   Hematological: Negative for adenopathy. Does not bruise/bleed easily.   Psychiatric/Behavioral: Negative for behavioral problems, confusion and suicidal ideas.   She complains of some drowsiness likely related to Keppra.  She has no fever or night sweats.  She has no weight loss.  She does have swelling of the right upper extremity.    Objective     Vitals:    05/08/19 0831   BP: 134/79   Pulse: 70   Resp: 16   Temp: 98.2 °F (36.8 °C)   TempSrc: Oral   SpO2: 94%   Weight: 69.8 kg (153 lb 14.4 oz)   Height: 164 cm (64.57\")   PainSc: 0-No pain     Current Status 5/8/2019   ECOG score 1       Physical Exam      GENERAL: Patient is alert and oriented x3, does not appear drowsy, is able to walk with a walker.  SKIN:  Warm, dry without rashes, purpura or petechiae.  EYES:  Pupils equal, round and reactive to light.  EOMs intact.  Conjunctivae normal.  EARS:  Hearing intact.  NOSE:  Septum midline.  No excoriations or nasal discharge.  MOUTH:  Tongue is well-papillated; no stomatitis or ulcers.  Lips normal.  THROAT:  Oropharynx without lesions or exudates.  NECK:  Supple with good range of motion; no thyromegaly or masses, no JVD.  LYMPHATICS:  No cervical, supraclavicular, axillary or inguinal adenopathy.  CHEST:  Lungs clear to auscultation. Good airflow.  BREAST: Status post right mastectomy, no chest " wall lesions, no evidence of right axillary adenopathy or right supraclavicular adenopathy.    Left breast: No evidence of any skin changes, no evidence of any left breast mass and no evidence of left nipple discharge as well as no left axillary adenopathy or left supraclavicular adenopathy.  CARDIAC:  Regular rate and rhythm without murmurs, rubs or gallops. Normal S1,S2.  ABDOMEN:  Soft, nontender with no hepatosplenomegaly or masses.  EXTREMITIES: Positive right upper extremity lymphedema, bilateral lower extremity edema.    NEUROLOGICAL:  Cranial Nerves II-XII grossly intact.  No focal neurological deficits.  PSYCHIATRIC:  Normal affect and mood.        RECENT LABS:  Hematology WBC   Date Value Ref Range Status   05/08/2019 4.71 3.40 - 10.80 10*3/mm3 Final   12/27/2018 7.63 4.50 - 10.70 10*3/mm3 Final     RBC   Date Value Ref Range Status   05/08/2019 4.41 3.77 - 5.28 10*6/mm3 Final   12/27/2018 4.33 3.90 - 5.20 10*6/mm3 Final     Hemoglobin   Date Value Ref Range Status   05/08/2019 12.6 12.0 - 15.9 g/dL Final     Hematocrit   Date Value Ref Range Status   05/08/2019 38.8 34.0 - 46.6 % Final     Platelets   Date Value Ref Range Status   05/08/2019 113 (L) 140 - 450 10*3/mm3 Final      CT ABDOMEN AND PELVIS   IMPRESSION:  1. Pathological portacaval lymphadenopathy.  Given patient's history of  breast cancer, this is most suggestive of metastatic disease.  2. There is a solid enhancing 4.3 cm right renal neoplasm. 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 Urology consult  is recommended.  3. Suspected large subserosal fibroid.  4. Colonic diverticulosis.    PA AND LATERAL CHEST  IMPRESSIONS: Moderate cardiomegaly. No evidence of acute disease within  the chest.    Duplex Venous Upper Ext  Study Impression     • Right Internal Jugular: No deep vein thrombosis noted  • Right Subclavian: No deep vein thrombosis noted  •  Right Axillary: No deep vein thrombosis noted  • Right Brachial: No deep vein thrombosis noted  • Right Radial: No deep vein thrombosis noted  • Right Ulnar: No deep vein thrombosis noted  • Right Basilic Upper Arm: No superficial thrombophlebitis noted  • Right Basilic Forearm: No superficial thrombophlebitis noted  • Right Cephalic Upper Arm: No superficial thrombophlebitis noted  • Right Cephalic Forearm: No superficial thrombophlebitis noted  • Left Internal Jugular: No deep vein thrombosis noted.   • Left Subclavian: No deep vein thrombosis noted.         Assessment/Plan     1.  History of right breast cancer in 1998 status post right T5 radical mastectomy, followed by tamoxifen x5 years without evidence of recurrence of 4.  Patient is overdue for left screening mammogram.    2.  Right upper extremity lymphedema since last 2 months is now followed by the lymphedema clinic.    3.  New portacaval lymph node 3.5 cm in size concerning for metastasis.  Given her history of breast cancer in the past certainly we would need to consider if she has evidence of any metastasis versus a new malignancy.  CT scan also showed evidence of a right kidney mass for which patient was seen by urologist Dr. Roberts who is going to follow that with observation with repeat CT in several months.  Reviewed CT scan and there is no evidence of any other lymphadenopathy and liver appears normal.  We will need to consider CT-guided biopsy of the portacaval lymph node  We will also obtain cancer markers today.    4.  New onset right renal mass followed by Dr. Roberts, we will get his records.  He is following with observation and repeat CT.    5.  Bilateral lower extremity edema with cardiomegaly, reviewed his previous echocardiogram and it shows valvular heart disease and we will refer her to Dr. Graves.    6.  Meningioma followed by Dr. Oliveira who was considering surgery but patient wants to wait.  I have reviewed her recent MRI of the brain  done March 2019.    7.  Seizure, new onset for which she has been on Keppra since last 3 months.  She has drowsiness since last 3 months she has been on Keppra.    8.  Thrombocytopenia, she has had intermittent thrombocytopenia but today it is 117.    Plan 1.  Obtain CT chest without contrast and a bone scan to rule out any distant metastasis    2.  Refer to cardiology to evaluate her cardiomegaly and bilateral lower extremity edema and to rule out valvular abnormalities and congestive heart failure.    3.  Obtain CT-guided biopsy of the portacaval lymph node.    4.  Refer to Dr. King to consider colonoscopy/EGD given new onset portacaval lymph node.    5.  Will obtain B12, RBC folate to evaluate her thrombocytopenia as well as IPF and iron studies given the fatigue.    6.  Obtain left screening mammogram.    7.  Follow-up with nurse practitioner on May 30, 2 unit appointment, nurse practitioner to discuss with me regarding this patient.    8.  She will likely require referral to neurology in future in order to address the drowsiness which could be related to her Keppra as she did not have drowsiness before starting Keppra.    Kristi Elizabeth MD

## 2019-05-09 ENCOUNTER — HOSPITAL ENCOUNTER (OUTPATIENT)
Dept: NUCLEAR MEDICINE | Facility: HOSPITAL | Age: 79
Discharge: HOME | End: 2019-05-09

## 2019-05-09 ENCOUNTER — HOSPITAL ENCOUNTER (OUTPATIENT)
Dept: CT IMAGING | Facility: HOSPITAL | Age: 79
Discharge: HOME | End: 2019-05-09
Admitting: INTERNAL MEDICINE

## 2019-05-09 ENCOUNTER — HOSPITAL ENCOUNTER (OUTPATIENT)
Dept: MAMMOGRAPHY | Facility: HOSPITAL | Age: 79
Discharge: HOME | End: 2019-05-09

## 2019-05-09 DIAGNOSIS — C79.9 METASTATIC CANCER (HCC): ICD-10-CM

## 2019-05-09 DIAGNOSIS — Z12.39 SCREENING FOR BREAST CANCER: ICD-10-CM

## 2019-05-09 LAB
FOLATE BLD-MCNC: 450.1 NG/ML
FOLATE RBC-MCNC: 1178 NG/ML
HCT VFR BLD AUTO: 38.2 % (ref 34–46.6)

## 2019-05-09 PROCEDURE — 70490 CT SOFT TISSUE NECK W/O DYE: CPT

## 2019-05-09 PROCEDURE — 78306 BONE IMAGING WHOLE BODY: CPT

## 2019-05-09 PROCEDURE — 71250 CT THORAX DX C-: CPT

## 2019-05-09 PROCEDURE — 0 TECHNETIUM MEDRONATE KIT: Performed by: INTERNAL MEDICINE

## 2019-05-09 PROCEDURE — 77067 SCR MAMMO BI INCL CAD: CPT

## 2019-05-09 PROCEDURE — A9503 TC99M MEDRONATE: HCPCS | Performed by: INTERNAL MEDICINE

## 2019-05-09 RX ORDER — TC 99M MEDRONATE 20 MG/10ML
21.7 INJECTION, POWDER, LYOPHILIZED, FOR SOLUTION INTRAVENOUS
Status: COMPLETED | OUTPATIENT
Start: 2019-05-09 | End: 2019-05-09

## 2019-05-09 RX ADMIN — Medication 21.7 MILLICURIE: at 10:00

## 2019-05-10 ENCOUNTER — TELEPHONE (OUTPATIENT)
Dept: ONCOLOGY | Facility: HOSPITAL | Age: 79
End: 2019-05-10

## 2019-05-10 ENCOUNTER — APPOINTMENT (OUTPATIENT)
Dept: OCCUPATIONAL THERAPY | Facility: HOSPITAL | Age: 79
End: 2019-05-10

## 2019-05-10 ENCOUNTER — APPOINTMENT (OUTPATIENT)
Dept: GENERAL RADIOLOGY | Facility: HOSPITAL | Age: 79
End: 2019-05-10

## 2019-05-10 ENCOUNTER — HOSPITAL ENCOUNTER (INPATIENT)
Facility: HOSPITAL | Age: 79
LOS: 9 days | Discharge: NURSING, REHAB OR LONG-TERM CARE | End: 2019-05-19
Attending: EMERGENCY MEDICINE | Admitting: INTERNAL MEDICINE
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
## 2019-05-10 DIAGNOSIS — R74.8 ELEVATED CK: ICD-10-CM

## 2019-05-10 DIAGNOSIS — S72.002A LEFT DISPLACED FEMORAL NECK FRACTURE (HCC): Primary | ICD-10-CM

## 2019-05-10 DIAGNOSIS — S72.009A HIP FRACTURE (HCC): ICD-10-CM

## 2019-05-10 DIAGNOSIS — R26.2 DIFFICULTY WALKING: ICD-10-CM

## 2019-05-10 DIAGNOSIS — R06.09 DOE (DYSPNEA ON EXERTION): ICD-10-CM

## 2019-05-10 DIAGNOSIS — R59.1 LYMPHADENOPATHY: ICD-10-CM

## 2019-05-10 DIAGNOSIS — J18.9 PNEUMONIA OF BOTH LUNGS DUE TO INFECTIOUS ORGANISM, UNSPECIFIED PART OF LUNG: ICD-10-CM

## 2019-05-10 DIAGNOSIS — R09.02 HYPOXIA: ICD-10-CM

## 2019-05-10 DIAGNOSIS — E87.6 HYPOKALEMIA: ICD-10-CM

## 2019-05-10 DIAGNOSIS — I27.20 PULMONARY HTN (HCC): ICD-10-CM

## 2019-05-10 DIAGNOSIS — C79.9 METASTATIC CANCER (HCC): ICD-10-CM

## 2019-05-10 DIAGNOSIS — C25.9 PANCREATIC ADENOCARCINOMA (HCC): ICD-10-CM

## 2019-05-10 DIAGNOSIS — C50.111 MALIGNANT NEOPLASM OF CENTRAL PORTION OF RIGHT FEMALE BREAST, UNSPECIFIED ESTROGEN RECEPTOR STATUS (HCC): ICD-10-CM

## 2019-05-10 DIAGNOSIS — R09.89 PULMONARY HYPERINFLATION: ICD-10-CM

## 2019-05-10 DIAGNOSIS — I36.1 NON-RHEUMATIC TRICUSPID VALVE INSUFFICIENCY: ICD-10-CM

## 2019-05-10 PROBLEM — T79.6XXA TRAUMATIC RHABDOMYOLYSIS (HCC): Status: ACTIVE | Noted: 2019-05-10

## 2019-05-10 LAB
ABO GROUP BLD: NORMAL
ALBUMIN SERPL-MCNC: 3.5 G/DL (ref 3.5–5.2)
ALBUMIN/GLOB SERPL: 1.3 G/DL
ALP SERPL-CCNC: 65 U/L (ref 39–117)
ALT SERPL W P-5'-P-CCNC: 20 U/L (ref 1–33)
ANION GAP SERPL CALCULATED.3IONS-SCNC: 16.2 MMOL/L
AST SERPL-CCNC: 46 U/L (ref 1–32)
B PARAPERT DNA SPEC QL NAA+PROBE: NOT DETECTED
B PERT DNA SPEC QL NAA+PROBE: NOT DETECTED
BACTERIA UR QL AUTO: NORMAL /HPF
BASOPHILS # BLD AUTO: 0.01 10*3/MM3 (ref 0–0.2)
BASOPHILS NFR BLD AUTO: 0.1 % (ref 0–1.5)
BILIRUB SERPL-MCNC: 1 MG/DL (ref 0.2–1.2)
BILIRUB UR QL STRIP: NEGATIVE
BLD GP AB SCN SERPL QL: NEGATIVE
BUN BLD-MCNC: 21 MG/DL (ref 8–23)
BUN/CREAT SERPL: 40.4 (ref 7–25)
C PNEUM DNA NPH QL NAA+NON-PROBE: NOT DETECTED
CALCIUM SPEC-SCNC: 9.5 MG/DL (ref 8.6–10.5)
CHLORIDE SERPL-SCNC: 99 MMOL/L (ref 98–107)
CK SERPL-CCNC: 722 U/L (ref 20–180)
CLARITY UR: ABNORMAL
CO2 SERPL-SCNC: 24.8 MMOL/L (ref 22–29)
COLOR UR: YELLOW
CREAT BLD-MCNC: 0.52 MG/DL (ref 0.57–1)
D-LACTATE SERPL-SCNC: 1.6 MMOL/L (ref 0.5–2)
DEPRECATED RDW RBC AUTO: 44.3 FL (ref 37–54)
EOSINOPHIL # BLD AUTO: 0 10*3/MM3 (ref 0–0.4)
EOSINOPHIL NFR BLD AUTO: 0 % (ref 0.3–6.2)
ERYTHROCYTE [DISTWIDTH] IN BLOOD BY AUTOMATED COUNT: 13.5 % (ref 12.3–15.4)
FLUAV H1 2009 PAND RNA NPH QL NAA+PROBE: NOT DETECTED
FLUAV H1 HA GENE NPH QL NAA+PROBE: NOT DETECTED
FLUAV H3 RNA NPH QL NAA+PROBE: NOT DETECTED
FLUAV SUBTYP SPEC NAA+PROBE: NOT DETECTED
FLUBV RNA ISLT QL NAA+PROBE: NOT DETECTED
GFR SERPL CREATININE-BSD FRML MDRD: 113 ML/MIN/1.73
GLOBULIN UR ELPH-MCNC: 2.7 GM/DL
GLUCOSE BLD-MCNC: 133 MG/DL (ref 65–99)
GLUCOSE UR STRIP-MCNC: ABNORMAL MG/DL
HADV DNA SPEC NAA+PROBE: NOT DETECTED
HCOV 229E RNA SPEC QL NAA+PROBE: NOT DETECTED
HCOV HKU1 RNA SPEC QL NAA+PROBE: NOT DETECTED
HCOV NL63 RNA SPEC QL NAA+PROBE: NOT DETECTED
HCOV OC43 RNA SPEC QL NAA+PROBE: NOT DETECTED
HCT VFR BLD AUTO: 40.5 % (ref 34–46.6)
HGB BLD-MCNC: 12.7 G/DL (ref 12–15.9)
HGB UR QL STRIP.AUTO: NEGATIVE
HMPV RNA NPH QL NAA+NON-PROBE: DETECTED
HPIV1 RNA SPEC QL NAA+PROBE: NOT DETECTED
HPIV2 RNA SPEC QL NAA+PROBE: NOT DETECTED
HPIV3 RNA NPH QL NAA+PROBE: NOT DETECTED
HPIV4 P GENE NPH QL NAA+PROBE: NOT DETECTED
HYALINE CASTS UR QL AUTO: NORMAL /LPF
INR PPP: 1.14 (ref 0.9–1.1)
KETONES UR QL STRIP: ABNORMAL
LEUKOCYTE ESTERASE UR QL STRIP.AUTO: NEGATIVE
LIPASE SERPL-CCNC: 8 U/L (ref 13–60)
LYMPHOCYTES # BLD AUTO: 0.48 10*3/MM3 (ref 0.7–3.1)
LYMPHOCYTES NFR BLD AUTO: 6.9 % (ref 19.6–45.3)
M PNEUMO IGG SER IA-ACNC: NOT DETECTED
MAGNESIUM SERPL-MCNC: 2 MG/DL (ref 1.6–2.4)
MCH RBC QN AUTO: 27.9 PG (ref 26.6–33)
MCHC RBC AUTO-ENTMCNC: 31.4 G/DL (ref 31.5–35.7)
MCV RBC AUTO: 88.8 FL (ref 79–97)
MONOCYTES # BLD AUTO: 0.6 10*3/MM3 (ref 0.1–0.9)
MONOCYTES NFR BLD AUTO: 8.6 % (ref 5–12)
NEUTROPHILS # BLD AUTO: 5.82 10*3/MM3 (ref 1.7–7)
NEUTROPHILS NFR BLD AUTO: 83.8 % (ref 42.7–76)
NITRITE UR QL STRIP: NEGATIVE
NT-PROBNP SERPL-MCNC: 3218 PG/ML (ref 5–1800)
PH UR STRIP.AUTO: 6 [PH] (ref 5–8)
PHOSPHATE SERPL-MCNC: 2.2 MG/DL (ref 2.5–4.5)
PLATELET # BLD AUTO: 114 10*3/MM3 (ref 140–450)
PMV BLD AUTO: 13 FL (ref 6–12)
POTASSIUM BLD-SCNC: 2.7 MMOL/L (ref 3.5–5.2)
PROCALCITONIN SERPL-MCNC: 0.15 NG/ML (ref 0.1–0.25)
PROT SERPL-MCNC: 6.2 G/DL (ref 6–8.5)
PROT UR QL STRIP: ABNORMAL
PROTHROMBIN TIME: 14.3 SECONDS (ref 11.7–14.2)
RBC # BLD AUTO: 4.56 10*6/MM3 (ref 3.77–5.28)
RBC # UR: NORMAL /HPF
REF LAB TEST METHOD: NORMAL
RH BLD: POSITIVE
RHINOVIRUS RNA SPEC NAA+PROBE: NOT DETECTED
RSV RNA NPH QL NAA+NON-PROBE: NOT DETECTED
SODIUM BLD-SCNC: 140 MMOL/L (ref 136–145)
SP GR UR STRIP: 1.02 (ref 1–1.03)
SQUAMOUS #/AREA URNS HPF: NORMAL /HPF
T&S EXPIRATION DATE: NORMAL
TROPONIN T SERPL-MCNC: <0.01 NG/ML (ref 0–0.03)
UROBILINOGEN UR QL STRIP: ABNORMAL
WBC NRBC COR # BLD: 6.95 10*3/MM3 (ref 3.4–10.8)
WBC UR QL AUTO: NORMAL /HPF

## 2019-05-10 PROCEDURE — 93010 ELECTROCARDIOGRAM REPORT: CPT | Performed by: INTERNAL MEDICINE

## 2019-05-10 PROCEDURE — 86900 BLOOD TYPING SEROLOGIC ABO: CPT | Performed by: EMERGENCY MEDICINE

## 2019-05-10 PROCEDURE — 84145 PROCALCITONIN (PCT): CPT | Performed by: EMERGENCY MEDICINE

## 2019-05-10 PROCEDURE — 83605 ASSAY OF LACTIC ACID: CPT | Performed by: EMERGENCY MEDICINE

## 2019-05-10 PROCEDURE — 36415 COLL VENOUS BLD VENIPUNCTURE: CPT

## 2019-05-10 PROCEDURE — 84100 ASSAY OF PHOSPHORUS: CPT | Performed by: NURSE PRACTITIONER

## 2019-05-10 PROCEDURE — 94799 UNLISTED PULMONARY SVC/PX: CPT

## 2019-05-10 PROCEDURE — 94640 AIRWAY INHALATION TREATMENT: CPT

## 2019-05-10 PROCEDURE — 85610 PROTHROMBIN TIME: CPT | Performed by: EMERGENCY MEDICINE

## 2019-05-10 PROCEDURE — 85025 COMPLETE CBC W/AUTO DIFF WBC: CPT | Performed by: EMERGENCY MEDICINE

## 2019-05-10 PROCEDURE — 87581 M.PNEUMON DNA AMP PROBE: CPT | Performed by: NURSE PRACTITIONER

## 2019-05-10 PROCEDURE — 25010000002 AZITHROMYCIN PER 500 MG: Performed by: EMERGENCY MEDICINE

## 2019-05-10 PROCEDURE — 83880 ASSAY OF NATRIURETIC PEPTIDE: CPT | Performed by: NURSE PRACTITIONER

## 2019-05-10 PROCEDURE — 83735 ASSAY OF MAGNESIUM: CPT | Performed by: EMERGENCY MEDICINE

## 2019-05-10 PROCEDURE — 82550 ASSAY OF CK (CPK): CPT | Performed by: EMERGENCY MEDICINE

## 2019-05-10 PROCEDURE — 93005 ELECTROCARDIOGRAM TRACING: CPT | Performed by: EMERGENCY MEDICINE

## 2019-05-10 PROCEDURE — 25010000002 MORPHINE PER 10 MG: Performed by: EMERGENCY MEDICINE

## 2019-05-10 PROCEDURE — 86850 RBC ANTIBODY SCREEN: CPT | Performed by: EMERGENCY MEDICINE

## 2019-05-10 PROCEDURE — 87040 BLOOD CULTURE FOR BACTERIA: CPT | Performed by: EMERGENCY MEDICINE

## 2019-05-10 PROCEDURE — 73502 X-RAY EXAM HIP UNI 2-3 VIEWS: CPT

## 2019-05-10 PROCEDURE — 71045 X-RAY EXAM CHEST 1 VIEW: CPT

## 2019-05-10 PROCEDURE — 84484 ASSAY OF TROPONIN QUANT: CPT | Performed by: EMERGENCY MEDICINE

## 2019-05-10 PROCEDURE — 83690 ASSAY OF LIPASE: CPT | Performed by: EMERGENCY MEDICINE

## 2019-05-10 PROCEDURE — 81001 URINALYSIS AUTO W/SCOPE: CPT | Performed by: EMERGENCY MEDICINE

## 2019-05-10 PROCEDURE — 87486 CHLMYD PNEUM DNA AMP PROBE: CPT | Performed by: NURSE PRACTITIONER

## 2019-05-10 PROCEDURE — 25010000002 CEFTRIAXONE PER 250 MG: Performed by: EMERGENCY MEDICINE

## 2019-05-10 PROCEDURE — 25010000002 ONDANSETRON PER 1 MG: Performed by: EMERGENCY MEDICINE

## 2019-05-10 PROCEDURE — 86901 BLOOD TYPING SEROLOGIC RH(D): CPT | Performed by: EMERGENCY MEDICINE

## 2019-05-10 PROCEDURE — 87633 RESP VIRUS 12-25 TARGETS: CPT | Performed by: NURSE PRACTITIONER

## 2019-05-10 PROCEDURE — 87798 DETECT AGENT NOS DNA AMP: CPT | Performed by: NURSE PRACTITIONER

## 2019-05-10 PROCEDURE — 99285 EMERGENCY DEPT VISIT HI MDM: CPT

## 2019-05-10 PROCEDURE — 80053 COMPREHEN METABOLIC PANEL: CPT | Performed by: EMERGENCY MEDICINE

## 2019-05-10 PROCEDURE — 87081 CULTURE SCREEN ONLY: CPT | Performed by: NURSE PRACTITIONER

## 2019-05-10 PROCEDURE — 83874 ASSAY OF MYOGLOBIN: CPT | Performed by: EMERGENCY MEDICINE

## 2019-05-10 RX ORDER — ATORVASTATIN CALCIUM 20 MG/1
20 TABLET, FILM COATED ORAL DAILY
Status: DISCONTINUED | OUTPATIENT
Start: 2019-05-10 | End: 2019-05-19 | Stop reason: HOSPADM

## 2019-05-10 RX ORDER — MORPHINE SULFATE 2 MG/ML
2 INJECTION, SOLUTION INTRAMUSCULAR; INTRAVENOUS
Status: DISCONTINUED | OUTPATIENT
Start: 2019-05-10 | End: 2019-05-19 | Stop reason: HOSPADM

## 2019-05-10 RX ORDER — ONDANSETRON 2 MG/ML
4 INJECTION INTRAMUSCULAR; INTRAVENOUS EVERY 4 HOURS PRN
Status: DISCONTINUED | OUTPATIENT
Start: 2019-05-10 | End: 2019-05-19 | Stop reason: HOSPADM

## 2019-05-10 RX ORDER — POTASSIUM CHLORIDE 1.5 G/1.77G
40 POWDER, FOR SOLUTION ORAL AS NEEDED
Status: DISCONTINUED | OUTPATIENT
Start: 2019-05-10 | End: 2019-05-19 | Stop reason: HOSPADM

## 2019-05-10 RX ORDER — POTASSIUM CHLORIDE 750 MG/1
40 CAPSULE, EXTENDED RELEASE ORAL AS NEEDED
Status: DISCONTINUED | OUTPATIENT
Start: 2019-05-10 | End: 2019-05-19 | Stop reason: HOSPADM

## 2019-05-10 RX ORDER — ONDANSETRON 2 MG/ML
4 INJECTION INTRAMUSCULAR; INTRAVENOUS ONCE
Status: COMPLETED | OUTPATIENT
Start: 2019-05-10 | End: 2019-05-10

## 2019-05-10 RX ORDER — IPRATROPIUM BROMIDE AND ALBUTEROL SULFATE 2.5; .5 MG/3ML; MG/3ML
3 SOLUTION RESPIRATORY (INHALATION)
Status: DISCONTINUED | OUTPATIENT
Start: 2019-05-10 | End: 2019-05-10 | Stop reason: SDUPTHER

## 2019-05-10 RX ORDER — LEVOTHYROXINE SODIUM 0.07 MG/1
75 TABLET ORAL
Status: DISCONTINUED | OUTPATIENT
Start: 2019-05-10 | End: 2019-05-19 | Stop reason: HOSPADM

## 2019-05-10 RX ORDER — POTASSIUM CHLORIDE 750 MG/1
40 CAPSULE, EXTENDED RELEASE ORAL ONCE
Status: COMPLETED | OUTPATIENT
Start: 2019-05-10 | End: 2019-05-10

## 2019-05-10 RX ORDER — LEVETIRACETAM 500 MG/1
500 TABLET ORAL 2 TIMES DAILY
Status: DISCONTINUED | OUTPATIENT
Start: 2019-05-10 | End: 2019-05-19 | Stop reason: HOSPADM

## 2019-05-10 RX ORDER — LISINOPRIL 20 MG/1
20 TABLET ORAL
Status: DISCONTINUED | OUTPATIENT
Start: 2019-05-10 | End: 2019-05-19 | Stop reason: HOSPADM

## 2019-05-10 RX ORDER — IPRATROPIUM BROMIDE AND ALBUTEROL SULFATE 2.5; .5 MG/3ML; MG/3ML
3 SOLUTION RESPIRATORY (INHALATION)
Status: DISCONTINUED | OUTPATIENT
Start: 2019-05-10 | End: 2019-05-19 | Stop reason: HOSPADM

## 2019-05-10 RX ORDER — ACETAMINOPHEN 325 MG/1
650 TABLET ORAL EVERY 6 HOURS PRN
Status: DISCONTINUED | OUTPATIENT
Start: 2019-05-10 | End: 2019-05-19 | Stop reason: HOSPADM

## 2019-05-10 RX ORDER — CEFTRIAXONE SODIUM 1 G/50ML
1 INJECTION, SOLUTION INTRAVENOUS ONCE
Status: COMPLETED | OUTPATIENT
Start: 2019-05-10 | End: 2019-05-10

## 2019-05-10 RX ORDER — NITROGLYCERIN 0.4 MG/1
0.4 TABLET SUBLINGUAL
Status: DISCONTINUED | OUTPATIENT
Start: 2019-05-10 | End: 2019-05-19 | Stop reason: HOSPADM

## 2019-05-10 RX ORDER — PROPRANOLOL HYDROCHLORIDE 40 MG/1
40 TABLET ORAL 2 TIMES DAILY
Status: DISCONTINUED | OUTPATIENT
Start: 2019-05-10 | End: 2019-05-19 | Stop reason: HOSPADM

## 2019-05-10 RX ORDER — MORPHINE SULFATE 2 MG/ML
4 INJECTION, SOLUTION INTRAMUSCULAR; INTRAVENOUS ONCE
Status: COMPLETED | OUTPATIENT
Start: 2019-05-10 | End: 2019-05-10

## 2019-05-10 RX ORDER — PAROXETINE 10 MG/1
10 TABLET, FILM COATED ORAL DAILY
Status: DISCONTINUED | OUTPATIENT
Start: 2019-05-10 | End: 2019-05-19 | Stop reason: HOSPADM

## 2019-05-10 RX ORDER — SODIUM CHLORIDE 9 MG/ML
75 INJECTION, SOLUTION INTRAVENOUS CONTINUOUS
Status: DISCONTINUED | OUTPATIENT
Start: 2019-05-10 | End: 2019-05-11

## 2019-05-10 RX ORDER — IPRATROPIUM BROMIDE AND ALBUTEROL SULFATE 2.5; .5 MG/3ML; MG/3ML
3 SOLUTION RESPIRATORY (INHALATION) EVERY 4 HOURS PRN
Status: DISCONTINUED | OUTPATIENT
Start: 2019-05-10 | End: 2019-05-19 | Stop reason: HOSPADM

## 2019-05-10 RX ORDER — CEFTRIAXONE SODIUM 1 G/50ML
1 INJECTION, SOLUTION INTRAVENOUS EVERY 24 HOURS
Status: DISPENSED | OUTPATIENT
Start: 2019-05-11 | End: 2019-05-18

## 2019-05-10 RX ADMIN — LISINOPRIL 20 MG: 20 TABLET ORAL at 21:05

## 2019-05-10 RX ADMIN — PROPRANOLOL HYDROCHLORIDE 40 MG: 40 TABLET ORAL at 21:05

## 2019-05-10 RX ADMIN — CEFTRIAXONE SODIUM 1 G: 1 INJECTION, SOLUTION INTRAVENOUS at 12:51

## 2019-05-10 RX ADMIN — MORPHINE SULFATE 4 MG: 2 INJECTION, SOLUTION INTRAMUSCULAR; INTRAVENOUS at 10:14

## 2019-05-10 RX ADMIN — LEVOTHYROXINE SODIUM 75 MCG: 75 TABLET ORAL at 21:05

## 2019-05-10 RX ADMIN — PAROXETINE HYDROCHLORIDE 10 MG: 10 TABLET, FILM COATED ORAL at 21:05

## 2019-05-10 RX ADMIN — POTASSIUM CHLORIDE 40 MEQ: 750 CAPSULE, EXTENDED RELEASE ORAL at 12:51

## 2019-05-10 RX ADMIN — LEVETIRACETAM 500 MG: 500 TABLET, FILM COATED ORAL at 21:06

## 2019-05-10 RX ADMIN — IPRATROPIUM BROMIDE AND ALBUTEROL SULFATE 3 ML: 2.5; .5 SOLUTION RESPIRATORY (INHALATION) at 19:28

## 2019-05-10 RX ADMIN — SODIUM CHLORIDE 75 ML/HR: 9 INJECTION, SOLUTION INTRAVENOUS at 21:06

## 2019-05-10 RX ADMIN — AZITHROMYCIN 500 MG: 500 INJECTION, POWDER, LYOPHILIZED, FOR SOLUTION INTRAVENOUS at 14:05

## 2019-05-10 RX ADMIN — POTASSIUM CHLORIDE 40 MEQ: 750 CAPSULE, EXTENDED RELEASE ORAL at 21:05

## 2019-05-10 RX ADMIN — ONDANSETRON 4 MG: 2 INJECTION INTRAMUSCULAR; INTRAVENOUS at 10:14

## 2019-05-10 RX ADMIN — ATORVASTATIN CALCIUM 20 MG: 20 TABLET, FILM COATED ORAL at 21:05

## 2019-05-10 RX ADMIN — SODIUM CHLORIDE 500 ML: 9 INJECTION, SOLUTION INTRAVENOUS at 10:14

## 2019-05-10 RX ADMIN — SODIUM CHLORIDE 125 ML/HR: 9 INJECTION, SOLUTION INTRAVENOUS at 11:30

## 2019-05-10 NOTE — TELEPHONE ENCOUNTER
----- Message from Kristi Elizabeth MD sent at 5/10/2019  3:56 PM EDT -----  Please call patient and let her know that CT chest shows bilateral possibly bronchitis versus pneumonia and if she is not allergic to Z-Elmo please give her a 5-day course of Z-Elmo, 500 mg on day 1 and then to 50 mg p.o. day 2 to day 4.  I could not contact her directly I left a message on her phone so please notify her.  If she feels very sick asked her to go to the emergency room.  Kristi Elizabeth MD      Attempted to call pt, no answer. Called emergency contact. Lacy states pt is in hospital with broken hip and on antibiotics for pneumonia as well. Informed Dr. Elizabeth

## 2019-05-11 ENCOUNTER — ANESTHESIA EVENT (OUTPATIENT)
Dept: PERIOP | Facility: HOSPITAL | Age: 79
End: 2019-05-11

## 2019-05-11 ENCOUNTER — ANESTHESIA (OUTPATIENT)
Dept: PERIOP | Facility: HOSPITAL | Age: 79
End: 2019-05-11

## 2019-05-11 LAB
25(OH)D3 SERPL-MCNC: 23 NG/ML (ref 30–100)
ANION GAP SERPL CALCULATED.3IONS-SCNC: 9.8 MMOL/L
BUN BLD-MCNC: 20 MG/DL (ref 8–23)
BUN/CREAT SERPL: 43.5 (ref 7–25)
CALCIUM SPEC-SCNC: 8.5 MG/DL (ref 8.6–10.5)
CHLORIDE SERPL-SCNC: 109 MMOL/L (ref 98–107)
CK SERPL-CCNC: 286 U/L (ref 20–180)
CO2 SERPL-SCNC: 23.2 MMOL/L (ref 22–29)
CREAT BLD-MCNC: 0.46 MG/DL (ref 0.57–1)
DEPRECATED RDW RBC AUTO: 46.1 FL (ref 37–54)
ERYTHROCYTE [DISTWIDTH] IN BLOOD BY AUTOMATED COUNT: 13.9 % (ref 12.3–15.4)
GFR SERPL CREATININE-BSD FRML MDRD: 130 ML/MIN/1.73
GLUCOSE BLD-MCNC: 134 MG/DL (ref 65–99)
HCT VFR BLD AUTO: 35.1 % (ref 34–46.6)
HGB BLD-MCNC: 11 G/DL (ref 12–15.9)
L PNEUMO1 AG UR QL IA: NEGATIVE
Lab: NORMAL
MCH RBC QN AUTO: 28.4 PG (ref 26.6–33)
MCHC RBC AUTO-ENTMCNC: 31.3 G/DL (ref 31.5–35.7)
MCV RBC AUTO: 90.5 FL (ref 79–97)
METHYLMALONATE SERPL-SCNC: 152 NMOL/L (ref 0–378)
PLATELET # BLD AUTO: 107 10*3/MM3 (ref 140–450)
PMV BLD AUTO: 13 FL (ref 6–12)
POTASSIUM BLD-SCNC: 3.9 MMOL/L (ref 3.5–5.2)
RBC # BLD AUTO: 3.88 10*6/MM3 (ref 3.77–5.28)
S PNEUM AG SPEC QL LA: NEGATIVE
SODIUM BLD-SCNC: 142 MMOL/L (ref 136–145)
URINE MYOGLOBIN, QUALITATIVE: NEGATIVE
WBC NRBC COR # BLD: 7.04 10*3/MM3 (ref 3.4–10.8)

## 2019-05-11 PROCEDURE — 94799 UNLISTED PULMONARY SVC/PX: CPT

## 2019-05-11 PROCEDURE — 80048 BASIC METABOLIC PNL TOTAL CA: CPT | Performed by: NURSE PRACTITIONER

## 2019-05-11 PROCEDURE — 85027 COMPLETE CBC AUTOMATED: CPT | Performed by: NURSE PRACTITIONER

## 2019-05-11 PROCEDURE — 87899 AGENT NOS ASSAY W/OPTIC: CPT | Performed by: NURSE PRACTITIONER

## 2019-05-11 PROCEDURE — 82550 ASSAY OF CK (CPK): CPT | Performed by: INTERNAL MEDICINE

## 2019-05-11 PROCEDURE — 25010000002 MORPHINE PER 10 MG: Performed by: INTERNAL MEDICINE

## 2019-05-11 PROCEDURE — 82306 VITAMIN D 25 HYDROXY: CPT | Performed by: NURSE PRACTITIONER

## 2019-05-11 PROCEDURE — 25010000002 CEFTRIAXONE PER 250 MG: Performed by: NURSE PRACTITIONER

## 2019-05-11 PROCEDURE — 25010000002 METHYLPREDNISOLONE PER 40 MG: Performed by: INTERNAL MEDICINE

## 2019-05-11 RX ORDER — CLINDAMYCIN PHOSPHATE 900 MG/50ML
900 INJECTION INTRAVENOUS ONCE
Status: COMPLETED | OUTPATIENT
Start: 2019-05-11 | End: 2019-05-12

## 2019-05-11 RX ORDER — ACETAMINOPHEN 500 MG
1000 TABLET ORAL ONCE
Status: DISCONTINUED | OUTPATIENT
Start: 2019-05-11 | End: 2019-05-12 | Stop reason: HOSPADM

## 2019-05-11 RX ORDER — VANCOMYCIN HYDROCHLORIDE 1 G/200ML
15 INJECTION, SOLUTION INTRAVENOUS ONCE
Status: COMPLETED | OUTPATIENT
Start: 2019-05-11 | End: 2019-05-12

## 2019-05-11 RX ORDER — HYDROCODONE BITARTRATE AND ACETAMINOPHEN 5; 325 MG/1; MG/1
1 TABLET ORAL EVERY 6 HOURS PRN
Status: DISCONTINUED | OUTPATIENT
Start: 2019-05-11 | End: 2019-05-16 | Stop reason: SDUPTHER

## 2019-05-11 RX ORDER — METHYLPREDNISOLONE SODIUM SUCCINATE 40 MG/ML
20 INJECTION, POWDER, LYOPHILIZED, FOR SOLUTION INTRAMUSCULAR; INTRAVENOUS EVERY 12 HOURS
Status: DISCONTINUED | OUTPATIENT
Start: 2019-05-11 | End: 2019-05-15

## 2019-05-11 RX ADMIN — IPRATROPIUM BROMIDE AND ALBUTEROL SULFATE 3 ML: 2.5; .5 SOLUTION RESPIRATORY (INHALATION) at 19:53

## 2019-05-11 RX ADMIN — IPRATROPIUM BROMIDE AND ALBUTEROL SULFATE 3 ML: 2.5; .5 SOLUTION RESPIRATORY (INHALATION) at 14:31

## 2019-05-11 RX ADMIN — MORPHINE SULFATE 2 MG: 2 INJECTION, SOLUTION INTRAMUSCULAR; INTRAVENOUS at 10:52

## 2019-05-11 RX ADMIN — LISINOPRIL 20 MG: 20 TABLET ORAL at 10:26

## 2019-05-11 RX ADMIN — CEFTRIAXONE SODIUM 1 G: 1 INJECTION, SOLUTION INTRAVENOUS at 12:06

## 2019-05-11 RX ADMIN — PAROXETINE HYDROCHLORIDE 10 MG: 10 TABLET, FILM COATED ORAL at 10:27

## 2019-05-11 RX ADMIN — IPRATROPIUM BROMIDE AND ALBUTEROL SULFATE 3 ML: 2.5; .5 SOLUTION RESPIRATORY (INHALATION) at 07:04

## 2019-05-11 RX ADMIN — METHYLPREDNISOLONE SODIUM SUCCINATE 20 MG: 40 INJECTION, POWDER, LYOPHILIZED, FOR SOLUTION INTRAMUSCULAR; INTRAVENOUS at 23:34

## 2019-05-11 RX ADMIN — LEVETIRACETAM 500 MG: 500 TABLET, FILM COATED ORAL at 20:50

## 2019-05-11 RX ADMIN — IPRATROPIUM BROMIDE AND ALBUTEROL SULFATE 3 ML: 2.5; .5 SOLUTION RESPIRATORY (INHALATION) at 11:46

## 2019-05-11 RX ADMIN — ATORVASTATIN CALCIUM 20 MG: 20 TABLET, FILM COATED ORAL at 10:27

## 2019-05-11 RX ADMIN — POTASSIUM CHLORIDE 40 MEQ: 750 CAPSULE, EXTENDED RELEASE ORAL at 03:08

## 2019-05-11 RX ADMIN — AZITHROMYCIN MONOHYDRATE 500 MG: 500 INJECTION, POWDER, LYOPHILIZED, FOR SOLUTION INTRAVENOUS at 16:11

## 2019-05-11 RX ADMIN — PROPRANOLOL HYDROCHLORIDE 40 MG: 40 TABLET ORAL at 10:27

## 2019-05-11 RX ADMIN — LEVOTHYROXINE SODIUM 75 MCG: 75 TABLET ORAL at 06:48

## 2019-05-11 RX ADMIN — LEVETIRACETAM 500 MG: 500 TABLET, FILM COATED ORAL at 10:27

## 2019-05-11 RX ADMIN — PROPRANOLOL HYDROCHLORIDE 40 MG: 40 TABLET ORAL at 20:50

## 2019-05-12 ENCOUNTER — APPOINTMENT (OUTPATIENT)
Dept: GENERAL RADIOLOGY | Facility: HOSPITAL | Age: 79
End: 2019-05-12

## 2019-05-12 PROBLEM — M62.82 NON-TRAUMATIC RHABDOMYOLYSIS: Status: ACTIVE | Noted: 2019-05-10

## 2019-05-12 LAB
ANION GAP SERPL CALCULATED.3IONS-SCNC: 7.3 MMOL/L
BASOPHILS # BLD AUTO: 0.02 10*3/MM3 (ref 0–0.2)
BASOPHILS NFR BLD AUTO: 0.4 % (ref 0–1.5)
BUN BLD-MCNC: 21 MG/DL (ref 8–23)
BUN/CREAT SERPL: 51.2 (ref 7–25)
CALCIUM SPEC-SCNC: 8.9 MG/DL (ref 8.6–10.5)
CHLORIDE SERPL-SCNC: 107 MMOL/L (ref 98–107)
CO2 SERPL-SCNC: 23.7 MMOL/L (ref 22–29)
CREAT BLD-MCNC: 0.41 MG/DL (ref 0.57–1)
DEPRECATED RDW RBC AUTO: 46.6 FL (ref 37–54)
EOSINOPHIL # BLD AUTO: 0 10*3/MM3 (ref 0–0.4)
EOSINOPHIL NFR BLD AUTO: 0 % (ref 0.3–6.2)
ERYTHROCYTE [DISTWIDTH] IN BLOOD BY AUTOMATED COUNT: 14 % (ref 12.3–15.4)
GFR SERPL CREATININE-BSD FRML MDRD: 149 ML/MIN/1.73
GLUCOSE BLD-MCNC: 167 MG/DL (ref 65–99)
HCT VFR BLD AUTO: 35.1 % (ref 34–46.6)
HGB BLD-MCNC: 10.8 G/DL (ref 12–15.9)
IMM GRANULOCYTES # BLD AUTO: 0.11 10*3/MM3 (ref 0–0.05)
IMM GRANULOCYTES NFR BLD AUTO: 2 % (ref 0–0.5)
LYMPHOCYTES # BLD AUTO: 0.49 10*3/MM3 (ref 0.7–3.1)
LYMPHOCYTES NFR BLD AUTO: 8.8 % (ref 19.6–45.3)
MCH RBC QN AUTO: 27.8 PG (ref 26.6–33)
MCHC RBC AUTO-ENTMCNC: 30.8 G/DL (ref 31.5–35.7)
MCV RBC AUTO: 90.2 FL (ref 79–97)
MONOCYTES # BLD AUTO: 0.22 10*3/MM3 (ref 0.1–0.9)
MONOCYTES NFR BLD AUTO: 4 % (ref 5–12)
MRSA SPEC QL CULT: NORMAL
NEUTROPHILS # BLD AUTO: 4.71 10*3/MM3 (ref 1.7–7)
NEUTROPHILS NFR BLD AUTO: 84.8 % (ref 42.7–76)
NRBC BLD AUTO-RTO: 0 /100 WBC (ref 0–0.2)
PLATELET # BLD AUTO: 102 10*3/MM3 (ref 140–450)
PMV BLD AUTO: 12.7 FL (ref 6–12)
POTASSIUM BLD-SCNC: 4 MMOL/L (ref 3.5–5.2)
RBC # BLD AUTO: 3.89 10*6/MM3 (ref 3.77–5.28)
SODIUM BLD-SCNC: 138 MMOL/L (ref 136–145)
WBC NRBC COR # BLD: 5.55 10*3/MM3 (ref 3.4–10.8)

## 2019-05-12 PROCEDURE — 25010000002 KETOROLAC TROMETHAMINE PER 15 MG: Performed by: ORTHOPAEDIC SURGERY

## 2019-05-12 PROCEDURE — 94799 UNLISTED PULMONARY SVC/PX: CPT

## 2019-05-12 PROCEDURE — 25010000002 DEXAMETHASONE PER 1 MG: Performed by: NURSE ANESTHETIST, CERTIFIED REGISTERED

## 2019-05-12 PROCEDURE — 73501 X-RAY EXAM HIP UNI 1 VIEW: CPT

## 2019-05-12 PROCEDURE — 85025 COMPLETE CBC W/AUTO DIFF WBC: CPT | Performed by: INTERNAL MEDICINE

## 2019-05-12 PROCEDURE — 25010000002 CLONIDINE PER 1 MG: Performed by: ORTHOPAEDIC SURGERY

## 2019-05-12 PROCEDURE — C1776 JOINT DEVICE (IMPLANTABLE): HCPCS | Performed by: ORTHOPAEDIC SURGERY

## 2019-05-12 PROCEDURE — 25010000002 MORPHINE PER 10 MG: Performed by: INTERNAL MEDICINE

## 2019-05-12 PROCEDURE — C1713 ANCHOR/SCREW BN/BN,TIS/BN: HCPCS | Performed by: ORTHOPAEDIC SURGERY

## 2019-05-12 PROCEDURE — 0SRB049 REPLACEMENT OF LEFT HIP JOINT WITH CERAMIC ON POLYETHYLENE SYNTHETIC SUBSTITUTE, CEMENTED, OPEN APPROACH: ICD-10-PCS | Performed by: ORTHOPAEDIC SURGERY

## 2019-05-12 PROCEDURE — 25010000002 ONDANSETRON PER 1 MG: Performed by: NURSE ANESTHETIST, CERTIFIED REGISTERED

## 2019-05-12 PROCEDURE — 25010000002 VANCOMYCIN PER 500 MG: Performed by: ORTHOPAEDIC SURGERY

## 2019-05-12 PROCEDURE — 73502 X-RAY EXAM HIP UNI 2-3 VIEWS: CPT

## 2019-05-12 PROCEDURE — 80048 BASIC METABOLIC PNL TOTAL CA: CPT | Performed by: INTERNAL MEDICINE

## 2019-05-12 PROCEDURE — 76000 FLUOROSCOPY <1 HR PHYS/QHP: CPT

## 2019-05-12 PROCEDURE — 88305 TISSUE EXAM BY PATHOLOGIST: CPT | Performed by: ORTHOPAEDIC SURGERY

## 2019-05-12 PROCEDURE — 25010000002 FENTANYL CITRATE (PF) 100 MCG/2ML SOLUTION: Performed by: NURSE ANESTHETIST, CERTIFIED REGISTERED

## 2019-05-12 PROCEDURE — 25010000002 METHYLPREDNISOLONE PER 40 MG: Performed by: INTERNAL MEDICINE

## 2019-05-12 PROCEDURE — 25010000002 PROPOFOL 10 MG/ML EMULSION: Performed by: NURSE ANESTHETIST, CERTIFIED REGISTERED

## 2019-05-12 PROCEDURE — 25010000002 ROPIVACAINE PER 1 MG: Performed by: ORTHOPAEDIC SURGERY

## 2019-05-12 PROCEDURE — 88311 DECALCIFY TISSUE: CPT | Performed by: ORTHOPAEDIC SURGERY

## 2019-05-12 DEVICE — SUT FW #2 W/TPR NDL 1/2 CIR 38IN 97CM 26.5MM BLU: Type: IMPLANTABLE DEVICE | Site: HIP | Status: FUNCTIONAL

## 2019-05-12 DEVICE — CMT BONE SIMPLEX/P FULL DOSE 10/PK: Type: IMPLANTABLE DEVICE | Site: HIP | Status: FUNCTIONAL

## 2019-05-12 DEVICE — SUMMIT FEMORAL STEM 12/14 TAPER CEMENTED SIZE 5 HI 120MM
Type: IMPLANTABLE DEVICE | Site: HIP | Status: FUNCTIONAL
Brand: SUMMIT

## 2019-05-12 DEVICE — PINNACLE HIP SOLUTIONS ALTRX POLYETHYLENE ACETABULAR LINER NEUTRAL 36MM ID 52MM OD
Type: IMPLANTABLE DEVICE | Site: HIP | Status: FUNCTIONAL
Brand: PINNACLE ALTRX

## 2019-05-12 DEVICE — CABL COCR 1.7MM WTI CR750MM STRL: Type: IMPLANTABLE DEVICE | Site: HIP | Status: FUNCTIONAL

## 2019-05-12 DEVICE — BIOLOX DELTA CERAMIC FEMORAL HEAD +5.0 36MM DIA 12/14 TAPER
Type: IMPLANTABLE DEVICE | Site: HIP | Status: FUNCTIONAL
Brand: BIOLOX DELTA

## 2019-05-12 DEVICE — TOTL HIP COP DEPUY 9641334: Type: IMPLANTABLE DEVICE | Site: HIP | Status: FUNCTIONAL

## 2019-05-12 DEVICE — CEMENTRALIZER STEM CENTRALIZER 10.0MM CEMENTED
Type: IMPLANTABLE DEVICE | Site: HIP | Status: FUNCTIONAL
Brand: CEMENTRALIZER

## 2019-05-12 DEVICE — PINNACLE GRIPTION ACETABULAR SHELL SECTOR 52MM OD
Type: IMPLANTABLE DEVICE | Site: HIP | Status: FUNCTIONAL
Brand: PINNACLE GRIPTION

## 2019-05-12 RX ORDER — PROMETHAZINE HYDROCHLORIDE 25 MG/1
25 TABLET ORAL ONCE AS NEEDED
Status: DISCONTINUED | OUTPATIENT
Start: 2019-05-12 | End: 2019-05-12 | Stop reason: HOSPADM

## 2019-05-12 RX ORDER — HYDROMORPHONE HYDROCHLORIDE 1 MG/ML
0.5 INJECTION, SOLUTION INTRAMUSCULAR; INTRAVENOUS; SUBCUTANEOUS
Status: DISCONTINUED | OUTPATIENT
Start: 2019-05-12 | End: 2019-05-12 | Stop reason: HOSPADM

## 2019-05-12 RX ORDER — LIDOCAINE HYDROCHLORIDE 20 MG/ML
INJECTION, SOLUTION INFILTRATION; PERINEURAL AS NEEDED
Status: DISCONTINUED | OUTPATIENT
Start: 2019-05-12 | End: 2019-05-12 | Stop reason: SURG

## 2019-05-12 RX ORDER — CLINDAMYCIN PHOSPHATE 900 MG/50ML
900 INJECTION INTRAVENOUS EVERY 8 HOURS
Status: COMPLETED | OUTPATIENT
Start: 2019-05-12 | End: 2019-05-13

## 2019-05-12 RX ORDER — LABETALOL HYDROCHLORIDE 5 MG/ML
5 INJECTION, SOLUTION INTRAVENOUS
Status: DISCONTINUED | OUTPATIENT
Start: 2019-05-12 | End: 2019-05-12 | Stop reason: HOSPADM

## 2019-05-12 RX ORDER — ROCURONIUM BROMIDE 10 MG/ML
INJECTION, SOLUTION INTRAVENOUS AS NEEDED
Status: DISCONTINUED | OUTPATIENT
Start: 2019-05-12 | End: 2019-05-12 | Stop reason: SURG

## 2019-05-12 RX ORDER — BUPIVACAINE HYDROCHLORIDE 7.5 MG/ML
INJECTION, SOLUTION INTRASPINAL
Status: DISPENSED
Start: 2019-05-12 | End: 2019-05-12

## 2019-05-12 RX ORDER — LIDOCAINE HYDROCHLORIDE 10 MG/ML
0.5 INJECTION, SOLUTION EPIDURAL; INFILTRATION; INTRACAUDAL; PERINEURAL ONCE AS NEEDED
Status: DISCONTINUED | OUTPATIENT
Start: 2019-05-12 | End: 2019-05-12 | Stop reason: HOSPADM

## 2019-05-12 RX ORDER — FAMOTIDINE 10 MG/ML
20 INJECTION, SOLUTION INTRAVENOUS ONCE
Status: COMPLETED | OUTPATIENT
Start: 2019-05-12 | End: 2019-05-12

## 2019-05-12 RX ORDER — DIPHENHYDRAMINE HCL 25 MG
25 CAPSULE ORAL
Status: DISCONTINUED | OUTPATIENT
Start: 2019-05-12 | End: 2019-05-12 | Stop reason: HOSPADM

## 2019-05-12 RX ORDER — NALOXONE HCL 0.4 MG/ML
0.2 VIAL (ML) INJECTION AS NEEDED
Status: DISCONTINUED | OUTPATIENT
Start: 2019-05-12 | End: 2019-05-12 | Stop reason: HOSPADM

## 2019-05-12 RX ORDER — HYDROCODONE BITARTRATE AND ACETAMINOPHEN 7.5; 325 MG/1; MG/1
1 TABLET ORAL ONCE AS NEEDED
Status: DISCONTINUED | OUTPATIENT
Start: 2019-05-12 | End: 2019-05-12 | Stop reason: HOSPADM

## 2019-05-12 RX ORDER — PROPOFOL 10 MG/ML
VIAL (ML) INTRAVENOUS AS NEEDED
Status: DISCONTINUED | OUTPATIENT
Start: 2019-05-12 | End: 2019-05-12 | Stop reason: SURG

## 2019-05-12 RX ORDER — SODIUM CHLORIDE, SODIUM LACTATE, POTASSIUM CHLORIDE, CALCIUM CHLORIDE 600; 310; 30; 20 MG/100ML; MG/100ML; MG/100ML; MG/100ML
9 INJECTION, SOLUTION INTRAVENOUS CONTINUOUS
Status: DISCONTINUED | OUTPATIENT
Start: 2019-05-12 | End: 2019-05-12

## 2019-05-12 RX ORDER — OXYCODONE AND ACETAMINOPHEN 7.5; 325 MG/1; MG/1
1 TABLET ORAL ONCE AS NEEDED
Status: DISCONTINUED | OUTPATIENT
Start: 2019-05-12 | End: 2019-05-12 | Stop reason: HOSPADM

## 2019-05-12 RX ORDER — MEPERIDINE HYDROCHLORIDE 25 MG/ML
12.5 INJECTION INTRAMUSCULAR; INTRAVENOUS; SUBCUTANEOUS
Status: DISCONTINUED | OUTPATIENT
Start: 2019-05-12 | End: 2019-05-12 | Stop reason: HOSPADM

## 2019-05-12 RX ORDER — FENTANYL CITRATE 50 UG/ML
INJECTION, SOLUTION INTRAMUSCULAR; INTRAVENOUS AS NEEDED
Status: DISCONTINUED | OUTPATIENT
Start: 2019-05-12 | End: 2019-05-12 | Stop reason: SURG

## 2019-05-12 RX ORDER — ACETAMINOPHEN 325 MG/1
650 TABLET ORAL ONCE AS NEEDED
Status: DISCONTINUED | OUTPATIENT
Start: 2019-05-12 | End: 2019-05-12 | Stop reason: HOSPADM

## 2019-05-12 RX ORDER — TRANEXAMIC ACID 100 MG/ML
INJECTION, SOLUTION INTRAVENOUS AS NEEDED
Status: DISCONTINUED | OUTPATIENT
Start: 2019-05-12 | End: 2019-05-12 | Stop reason: SURG

## 2019-05-12 RX ORDER — PROMETHAZINE HYDROCHLORIDE 25 MG/ML
12.5 INJECTION, SOLUTION INTRAMUSCULAR; INTRAVENOUS ONCE AS NEEDED
Status: DISCONTINUED | OUTPATIENT
Start: 2019-05-12 | End: 2019-05-12 | Stop reason: HOSPADM

## 2019-05-12 RX ORDER — FENTANYL CITRATE 50 UG/ML
INJECTION, SOLUTION INTRAMUSCULAR; INTRAVENOUS
Status: COMPLETED
Start: 2019-05-12 | End: 2019-05-12

## 2019-05-12 RX ORDER — HYDRALAZINE HYDROCHLORIDE 20 MG/ML
5 INJECTION INTRAMUSCULAR; INTRAVENOUS
Status: DISCONTINUED | OUTPATIENT
Start: 2019-05-12 | End: 2019-05-12 | Stop reason: HOSPADM

## 2019-05-12 RX ORDER — EPHEDRINE SULFATE 50 MG/ML
INJECTION, SOLUTION INTRAVENOUS AS NEEDED
Status: DISCONTINUED | OUTPATIENT
Start: 2019-05-12 | End: 2019-05-12 | Stop reason: SURG

## 2019-05-12 RX ORDER — FENTANYL CITRATE 50 UG/ML
50 INJECTION, SOLUTION INTRAMUSCULAR; INTRAVENOUS
Status: DISCONTINUED | OUTPATIENT
Start: 2019-05-12 | End: 2019-05-12 | Stop reason: HOSPADM

## 2019-05-12 RX ORDER — SODIUM CHLORIDE 0.9 % (FLUSH) 0.9 %
1-10 SYRINGE (ML) INJECTION AS NEEDED
Status: DISCONTINUED | OUTPATIENT
Start: 2019-05-12 | End: 2019-05-12 | Stop reason: HOSPADM

## 2019-05-12 RX ORDER — SODIUM CHLORIDE 9 MG/ML
100 INJECTION, SOLUTION INTRAVENOUS CONTINUOUS
Status: ACTIVE | OUTPATIENT
Start: 2019-05-12 | End: 2019-05-13

## 2019-05-12 RX ORDER — ONDANSETRON 2 MG/ML
INJECTION INTRAMUSCULAR; INTRAVENOUS AS NEEDED
Status: DISCONTINUED | OUTPATIENT
Start: 2019-05-12 | End: 2019-05-12 | Stop reason: SURG

## 2019-05-12 RX ORDER — FLUMAZENIL 0.1 MG/ML
0.2 INJECTION INTRAVENOUS AS NEEDED
Status: DISCONTINUED | OUTPATIENT
Start: 2019-05-12 | End: 2019-05-12 | Stop reason: HOSPADM

## 2019-05-12 RX ORDER — PROMETHAZINE HYDROCHLORIDE 25 MG/1
25 SUPPOSITORY RECTAL ONCE AS NEEDED
Status: DISCONTINUED | OUTPATIENT
Start: 2019-05-12 | End: 2019-05-12 | Stop reason: HOSPADM

## 2019-05-12 RX ORDER — HYDROCODONE BITARTRATE AND ACETAMINOPHEN 7.5; 325 MG/1; MG/1
1 TABLET ORAL EVERY 4 HOURS PRN
Status: DISCONTINUED | OUTPATIENT
Start: 2019-05-12 | End: 2019-05-19 | Stop reason: HOSPADM

## 2019-05-12 RX ORDER — EPHEDRINE SULFATE 50 MG/ML
5 INJECTION, SOLUTION INTRAVENOUS ONCE AS NEEDED
Status: DISCONTINUED | OUTPATIENT
Start: 2019-05-12 | End: 2019-05-12 | Stop reason: HOSPADM

## 2019-05-12 RX ORDER — ONDANSETRON 2 MG/ML
4 INJECTION INTRAMUSCULAR; INTRAVENOUS ONCE AS NEEDED
Status: DISCONTINUED | OUTPATIENT
Start: 2019-05-12 | End: 2019-05-12 | Stop reason: HOSPADM

## 2019-05-12 RX ORDER — DEXAMETHASONE SODIUM PHOSPHATE 10 MG/ML
INJECTION INTRAMUSCULAR; INTRAVENOUS AS NEEDED
Status: DISCONTINUED | OUTPATIENT
Start: 2019-05-12 | End: 2019-05-12 | Stop reason: SURG

## 2019-05-12 RX ORDER — PROMETHAZINE HYDROCHLORIDE 25 MG/ML
6.25 INJECTION, SOLUTION INTRAMUSCULAR; INTRAVENOUS
Status: DISCONTINUED | OUTPATIENT
Start: 2019-05-12 | End: 2019-05-12 | Stop reason: HOSPADM

## 2019-05-12 RX ORDER — DIPHENHYDRAMINE HYDROCHLORIDE 50 MG/ML
12.5 INJECTION INTRAMUSCULAR; INTRAVENOUS
Status: DISCONTINUED | OUTPATIENT
Start: 2019-05-12 | End: 2019-05-12 | Stop reason: HOSPADM

## 2019-05-12 RX ADMIN — TRANEXAMIC ACID 1000 MG: 100 INJECTION, SOLUTION INTRAVENOUS at 13:26

## 2019-05-12 RX ADMIN — FENTANYL CITRATE 25 MCG: 50 INJECTION INTRAMUSCULAR; INTRAVENOUS at 12:53

## 2019-05-12 RX ADMIN — FENTANYL CITRATE 25 MCG: 50 INJECTION INTRAMUSCULAR; INTRAVENOUS at 12:45

## 2019-05-12 RX ADMIN — IPRATROPIUM BROMIDE AND ALBUTEROL SULFATE 3 ML: 2.5; .5 SOLUTION RESPIRATORY (INHALATION) at 07:14

## 2019-05-12 RX ADMIN — FENTANYL CITRATE 25 MCG: 50 INJECTION INTRAMUSCULAR; INTRAVENOUS at 13:13

## 2019-05-12 RX ADMIN — LEVETIRACETAM 500 MG: 500 TABLET, FILM COATED ORAL at 09:13

## 2019-05-12 RX ADMIN — LEVETIRACETAM 500 MG: 500 TABLET, FILM COATED ORAL at 21:09

## 2019-05-12 RX ADMIN — METHYLPREDNISOLONE SODIUM SUCCINATE 20 MG: 40 INJECTION, POWDER, LYOPHILIZED, FOR SOLUTION INTRAMUSCULAR; INTRAVENOUS at 21:09

## 2019-05-12 RX ADMIN — HYDROCODONE BITARTRATE AND ACETAMINOPHEN 1 TABLET: 7.5; 325 TABLET ORAL at 21:09

## 2019-05-12 RX ADMIN — FENTANYL CITRATE 25 MCG: 50 INJECTION INTRAMUSCULAR; INTRAVENOUS at 13:05

## 2019-05-12 RX ADMIN — DEXAMETHASONE SODIUM PHOSPHATE 8 MG: 10 INJECTION INTRAMUSCULAR; INTRAVENOUS at 13:59

## 2019-05-12 RX ADMIN — ONDANSETRON 4 MG: 2 INJECTION INTRAMUSCULAR; INTRAVENOUS at 15:18

## 2019-05-12 RX ADMIN — FAMOTIDINE 20 MG: 10 INJECTION INTRAVENOUS at 11:44

## 2019-05-12 RX ADMIN — IPRATROPIUM BROMIDE AND ALBUTEROL SULFATE 3 ML: 2.5; .5 SOLUTION RESPIRATORY (INHALATION) at 19:54

## 2019-05-12 RX ADMIN — METHYLPREDNISOLONE SODIUM SUCCINATE 20 MG: 40 INJECTION, POWDER, LYOPHILIZED, FOR SOLUTION INTRAMUSCULAR; INTRAVENOUS at 09:14

## 2019-05-12 RX ADMIN — SODIUM CHLORIDE 100 ML/HR: 9 INJECTION, SOLUTION INTRAVENOUS at 17:47

## 2019-05-12 RX ADMIN — VANCOMYCIN HYDROCHLORIDE 1000 MG: 1 INJECTION, SOLUTION INTRAVENOUS at 11:44

## 2019-05-12 RX ADMIN — ATORVASTATIN CALCIUM 20 MG: 20 TABLET, FILM COATED ORAL at 09:13

## 2019-05-12 RX ADMIN — PAROXETINE HYDROCHLORIDE 10 MG: 10 TABLET, FILM COATED ORAL at 09:13

## 2019-05-12 RX ADMIN — PROPRANOLOL HYDROCHLORIDE 40 MG: 40 TABLET ORAL at 22:55

## 2019-05-12 RX ADMIN — EPHEDRINE SULFATE 10 MG: 50 INJECTION INTRAMUSCULAR; INTRAVENOUS; SUBCUTANEOUS at 14:28

## 2019-05-12 RX ADMIN — MUPIROCIN 1 APPLICATION: 20 OINTMENT TOPICAL at 11:38

## 2019-05-12 RX ADMIN — SODIUM CHLORIDE, POTASSIUM CHLORIDE, SODIUM LACTATE AND CALCIUM CHLORIDE 9 ML/HR: 600; 310; 30; 20 INJECTION, SOLUTION INTRAVENOUS at 11:44

## 2019-05-12 RX ADMIN — SODIUM CHLORIDE, POTASSIUM CHLORIDE, SODIUM LACTATE AND CALCIUM CHLORIDE: 600; 310; 30; 20 INJECTION, SOLUTION INTRAVENOUS at 15:25

## 2019-05-12 RX ADMIN — SUGAMMADEX 200 MG: 100 INJECTION, SOLUTION INTRAVENOUS at 15:25

## 2019-05-12 RX ADMIN — LISINOPRIL 20 MG: 20 TABLET ORAL at 09:13

## 2019-05-12 RX ADMIN — ROCURONIUM BROMIDE 40 MG: 10 INJECTION INTRAVENOUS at 13:20

## 2019-05-12 RX ADMIN — CLINDAMYCIN PHOSPHATE 900 MG: 900 INJECTION, SOLUTION INTRAVENOUS at 17:49

## 2019-05-12 RX ADMIN — MORPHINE SULFATE 2 MG: 2 INJECTION, SOLUTION INTRAMUSCULAR; INTRAVENOUS at 09:13

## 2019-05-12 RX ADMIN — CLINDAMYCIN PHOSPHATE 900 MG: 900 INJECTION INTRAVENOUS at 13:23

## 2019-05-12 RX ADMIN — PROPRANOLOL HYDROCHLORIDE 40 MG: 40 TABLET ORAL at 09:13

## 2019-05-12 RX ADMIN — SODIUM CHLORIDE, POTASSIUM CHLORIDE, SODIUM LACTATE AND CALCIUM CHLORIDE: 600; 310; 30; 20 INJECTION, SOLUTION INTRAVENOUS at 12:43

## 2019-05-12 RX ADMIN — LIDOCAINE HYDROCHLORIDE 100 MG: 20 INJECTION, SOLUTION INFILTRATION; PERINEURAL at 13:20

## 2019-05-12 RX ADMIN — PROPOFOL 100 MG: 10 INJECTION, EMULSION INTRAVENOUS at 13:20

## 2019-05-12 NOTE — ANESTHESIA PREPROCEDURE EVALUATION
Anesthesia Evaluation     Patient summary reviewed and Nursing notes reviewed   NPO Solid Status: > 8 hours  NPO Liquid Status: > 2 hours           Airway   Mallampati: III  TM distance: <3 FB  Possible difficult intubation  Dental - normal exam     Pulmonary    (+) pneumonia , rhonchi, decreased breath sounds, wheezes,   Cardiovascular - normal exam    ECG reviewed    (+) hypertension, hyperlipidemia,       Neuro/Psych  (+) seizures, psychiatric history Anxiety,     GI/Hepatic/Renal/Endo    (+)   hypothyroidism,     Musculoskeletal     Abdominal    Substance History      OB/GYN          Other   (+) arthritis   history of cancer                  Anesthesia Plan    ASA 4     spinal and general

## 2019-05-12 NOTE — ANESTHESIA PROCEDURE NOTES
Airway  Urgency: elective    Date/Time: 5/12/2019 1:34 PM  Airway not difficult    General Information and Staff    Patient location during procedure: OR  Anesthesiologist: Chi Herndon MD  CRNA: Andie Best CRNA    Indications and Patient Condition  Indications for airway management: airway protection    Preoxygenated: yes  MILS not maintained throughout  Mask difficulty assessment: 1 - vent by mask    Final Airway Details  Final airway type: endotracheal airway      Successful airway: ETT  Cuffed: yes   Successful intubation technique: direct laryngoscopy  Endotracheal tube insertion site: oral  Blade: Viktoria  Blade size: 3  ETT size (mm): 7.0  Cormack-Lehane Classification: grade I - full view of glottis  Placement verified by: chest auscultation and capnometry   Cuff volume (mL): 7  Measured from: lips  ETT to lips (cm): 22  Number of attempts at approach: 1    Additional Comments  Pt preoxygenated prior to induction, easy mask airway, atraumatic intubation,+ ETCO2, + bs bilat,  ETT secured and connected to ventilator.

## 2019-05-13 ENCOUNTER — APPOINTMENT (OUTPATIENT)
Dept: CARDIOLOGY | Facility: HOSPITAL | Age: 79
End: 2019-05-13

## 2019-05-13 PROBLEM — G93.41 METABOLIC ENCEPHALOPATHY: Status: ACTIVE | Noted: 2019-05-13

## 2019-05-13 LAB
ANION GAP SERPL CALCULATED.3IONS-SCNC: 10 MMOL/L
AORTIC DIMENSIONLESS INDEX: 0.7 (DI)
BASOPHILS # BLD AUTO: 0.01 10*3/MM3 (ref 0–0.2)
BASOPHILS NFR BLD AUTO: 0.1 % (ref 0–1.5)
BH CV ECHO MEAS - ACS: 1.7 CM
BH CV ECHO MEAS - AO MAX PG (FULL): 4.6 MMHG
BH CV ECHO MEAS - AO MAX PG: 10 MMHG
BH CV ECHO MEAS - AO MEAN PG (FULL): 2 MMHG
BH CV ECHO MEAS - AO MEAN PG: 5 MMHG
BH CV ECHO MEAS - AO ROOT AREA (BSA CORRECTED): 1.5
BH CV ECHO MEAS - AO ROOT AREA: 6.2 CM^2
BH CV ECHO MEAS - AO ROOT DIAM: 2.8 CM
BH CV ECHO MEAS - AO V2 MAX: 158 CM/SEC
BH CV ECHO MEAS - AO V2 MEAN: 108 CM/SEC
BH CV ECHO MEAS - AO V2 VTI: 39.2 CM
BH CV ECHO MEAS - ASC AORTA: 3 CM
BH CV ECHO MEAS - AVA(I,A): 1.6 CM^2
BH CV ECHO MEAS - AVA(I,D): 1.6 CM^2
BH CV ECHO MEAS - AVA(V,A): 1.7 CM^2
BH CV ECHO MEAS - AVA(V,D): 1.7 CM^2
BH CV ECHO MEAS - BSA(HAYCOCK): 1.9 M^2
BH CV ECHO MEAS - BSA: 1.9 M^2
BH CV ECHO MEAS - BZI_BMI: 29 KILOGRAMS/M^2
BH CV ECHO MEAS - BZI_METRIC_HEIGHT: 165 CM
BH CV ECHO MEAS - BZI_METRIC_WEIGHT: 79 KG
BH CV ECHO MEAS - EDV(CUBED): 59.3 ML
BH CV ECHO MEAS - EDV(MOD-SP2): 85 ML
BH CV ECHO MEAS - EDV(MOD-SP4): 67 ML
BH CV ECHO MEAS - EDV(TEICH): 65.9 ML
BH CV ECHO MEAS - EF(CUBED): 58.9 %
BH CV ECHO MEAS - EF(MOD-BP): 60 %
BH CV ECHO MEAS - EF(MOD-SP2): 55.3 %
BH CV ECHO MEAS - EF(MOD-SP4): 65.7 %
BH CV ECHO MEAS - EF(TEICH): 51.1 %
BH CV ECHO MEAS - ESV(CUBED): 24.4 ML
BH CV ECHO MEAS - ESV(MOD-SP2): 38 ML
BH CV ECHO MEAS - ESV(MOD-SP4): 23 ML
BH CV ECHO MEAS - ESV(TEICH): 32.2 ML
BH CV ECHO MEAS - FS: 25.6 %
BH CV ECHO MEAS - IVS/LVPW: 1.2
BH CV ECHO MEAS - IVSD: 1.2 CM
BH CV ECHO MEAS - LAT PEAK E' VEL: 10.9 CM/SEC
BH CV ECHO MEAS - LV DIASTOLIC VOL/BSA (35-75): 35.9 ML/M^2
BH CV ECHO MEAS - LV MASS(C)D: 140.1 GRAMS
BH CV ECHO MEAS - LV MASS(C)DI: 75.1 GRAMS/M^2
BH CV ECHO MEAS - LV MAX PG: 5.4 MMHG
BH CV ECHO MEAS - LV MEAN PG: 3 MMHG
BH CV ECHO MEAS - LV SYSTOLIC VOL/BSA (12-30): 12.3 ML/M^2
BH CV ECHO MEAS - LV V1 MAX: 116 CM/SEC
BH CV ECHO MEAS - LV V1 MEAN: 76.8 CM/SEC
BH CV ECHO MEAS - LV V1 VTI: 26.8 CM
BH CV ECHO MEAS - LVIDD: 3.9 CM
BH CV ECHO MEAS - LVIDS: 2.9 CM
BH CV ECHO MEAS - LVLD AP2: 7.3 CM
BH CV ECHO MEAS - LVLD AP4: 7.5 CM
BH CV ECHO MEAS - LVLS AP2: 5.7 CM
BH CV ECHO MEAS - LVLS AP4: 6.2 CM
BH CV ECHO MEAS - LVOT AREA (M): 2.3 CM^2
BH CV ECHO MEAS - LVOT AREA: 2.3 CM^2
BH CV ECHO MEAS - LVOT DIAM: 1.7 CM
BH CV ECHO MEAS - LVPWD: 1 CM
BH CV ECHO MEAS - MED PEAK E' VEL: 8.49 CM/SEC
BH CV ECHO MEAS - MV A DUR: 143 SEC
BH CV ECHO MEAS - MV A MAX VEL: 109 CM/SEC
BH CV ECHO MEAS - MV DEC SLOPE: 527.3 CM/SEC^2
BH CV ECHO MEAS - MV DEC TIME: 174 SEC
BH CV ECHO MEAS - MV E MAX VEL: 113.5 CM/SEC
BH CV ECHO MEAS - MV E/A: 1
BH CV ECHO MEAS - MV MAX PG: 6.1 MMHG
BH CV ECHO MEAS - MV MEAN PG: 3 MMHG
BH CV ECHO MEAS - MV P1/2T MAX VEL: 131 CM/SEC
BH CV ECHO MEAS - MV P1/2T: 72.8 MSEC
BH CV ECHO MEAS - MV V2 MAX: 123 CM/SEC
BH CV ECHO MEAS - MV V2 MEAN: 78.8 CM/SEC
BH CV ECHO MEAS - MV V2 VTI: 37.6 CM
BH CV ECHO MEAS - MVA P1/2T LCG: 1.7 CM^2
BH CV ECHO MEAS - MVA(P1/2T): 3 CM^2
BH CV ECHO MEAS - MVA(VTI): 1.6 CM^2
BH CV ECHO MEAS - PA ACC TIME: 0.13 SEC
BH CV ECHO MEAS - PA PR(ACCEL): 21.9 MMHG
BH CV ECHO MEAS - PULM A REVS DUR: 0.11 SEC
BH CV ECHO MEAS - PULM A REVS VEL: 28.6 CM/SEC
BH CV ECHO MEAS - PULM DIAS VEL: 52.4 CM/SEC
BH CV ECHO MEAS - PULM S/D: 1.4
BH CV ECHO MEAS - PULM SYS VEL: 72.4 CM/SEC
BH CV ECHO MEAS - QP/QS: 1
BH CV ECHO MEAS - RAP SYSTOLE: 15 MMHG
BH CV ECHO MEAS - RV MAX PG: 1.2 MMHG
BH CV ECHO MEAS - RV MEAN PG: 1 MMHG
BH CV ECHO MEAS - RV V1 MAX: 54.9 CM/SEC
BH CV ECHO MEAS - RV V1 MEAN: 38.6 CM/SEC
BH CV ECHO MEAS - RV V1 VTI: 15.1 CM
BH CV ECHO MEAS - RVOT AREA: 4.2 CM^2
BH CV ECHO MEAS - RVOT DIAM: 2.3 CM
BH CV ECHO MEAS - RVSP: 64.3 MMHG
BH CV ECHO MEAS - SI(AO): 129.5 ML/M^2
BH CV ECHO MEAS - SI(CUBED): 18.7 ML/M^2
BH CV ECHO MEAS - SI(LVOT): 32.6 ML/M^2
BH CV ECHO MEAS - SI(MOD-SP2): 25.2 ML/M^2
BH CV ECHO MEAS - SI(MOD-SP4): 23.6 ML/M^2
BH CV ECHO MEAS - SI(TEICH): 18.1 ML/M^2
BH CV ECHO MEAS - SV(AO): 241.4 ML
BH CV ECHO MEAS - SV(CUBED): 34.9 ML
BH CV ECHO MEAS - SV(LVOT): 60.8 ML
BH CV ECHO MEAS - SV(MOD-SP2): 47 ML
BH CV ECHO MEAS - SV(MOD-SP4): 44 ML
BH CV ECHO MEAS - SV(RVOT): 62.7 ML
BH CV ECHO MEAS - SV(TEICH): 33.7 ML
BH CV ECHO MEAS - TAPSE (>1.6): 2.9 CM2
BH CV ECHO MEAS - TR MAX VEL: 351 CM/SEC
BH CV ECHO MEASUREMENTS AVERAGE E/E' RATIO: 11.71
BH CV VAS BP RIGHT ARM: NORMAL MMHG
BH CV XLRA - RV BASE: 3.9 CM
BH CV XLRA - TDI S': 18 CM/SEC
BUN BLD-MCNC: 36 MG/DL (ref 8–23)
BUN/CREAT SERPL: 50.7 (ref 7–25)
CALCIUM SPEC-SCNC: 8.6 MG/DL (ref 8.6–10.5)
CHLORIDE SERPL-SCNC: 111 MMOL/L (ref 98–107)
CO2 SERPL-SCNC: 23 MMOL/L (ref 22–29)
CREAT BLD-MCNC: 0.71 MG/DL (ref 0.57–1)
DEPRECATED RDW RBC AUTO: 45.7 FL (ref 37–54)
EOSINOPHIL # BLD AUTO: 0 10*3/MM3 (ref 0–0.4)
EOSINOPHIL NFR BLD AUTO: 0 % (ref 0.3–6.2)
ERYTHROCYTE [DISTWIDTH] IN BLOOD BY AUTOMATED COUNT: 13.9 % (ref 12.3–15.4)
GFR SERPL CREATININE-BSD FRML MDRD: 79 ML/MIN/1.73
GLUCOSE BLD-MCNC: 162 MG/DL (ref 65–99)
HCT VFR BLD AUTO: 32.6 % (ref 34–46.6)
HGB BLD-MCNC: 10.1 G/DL (ref 12–15.9)
IMM GRANULOCYTES # BLD AUTO: 0.2 10*3/MM3 (ref 0–0.05)
IMM GRANULOCYTES NFR BLD AUTO: 2 % (ref 0–0.5)
LEFT ATRIUM VOLUME INDEX: 35 ML/M2
LV EF 2D ECHO EST: 60 %
LYMPHOCYTES # BLD AUTO: 0.46 10*3/MM3 (ref 0.7–3.1)
LYMPHOCYTES NFR BLD AUTO: 4.7 % (ref 19.6–45.3)
MCH RBC QN AUTO: 27.7 PG (ref 26.6–33)
MCHC RBC AUTO-ENTMCNC: 31 G/DL (ref 31.5–35.7)
MCV RBC AUTO: 89.6 FL (ref 79–97)
MONOCYTES # BLD AUTO: 0.79 10*3/MM3 (ref 0.1–0.9)
MONOCYTES NFR BLD AUTO: 8.1 % (ref 5–12)
NEUTROPHILS # BLD AUTO: 8.35 10*3/MM3 (ref 1.7–7)
NEUTROPHILS NFR BLD AUTO: 85.1 % (ref 42.7–76)
NRBC BLD AUTO-RTO: 0 /100 WBC (ref 0–0.2)
PLATELET # BLD AUTO: 142 10*3/MM3 (ref 140–450)
PMV BLD AUTO: 12.9 FL (ref 6–12)
POTASSIUM BLD-SCNC: 4.1 MMOL/L (ref 3.5–5.2)
RBC # BLD AUTO: 3.64 10*6/MM3 (ref 3.77–5.28)
SODIUM BLD-SCNC: 144 MMOL/L (ref 136–145)
WBC NRBC COR # BLD: 9.81 10*3/MM3 (ref 3.4–10.8)

## 2019-05-13 PROCEDURE — 97110 THERAPEUTIC EXERCISES: CPT

## 2019-05-13 PROCEDURE — 80048 BASIC METABOLIC PNL TOTAL CA: CPT | Performed by: INTERNAL MEDICINE

## 2019-05-13 PROCEDURE — 25010000002 METHYLPREDNISOLONE PER 40 MG: Performed by: INTERNAL MEDICINE

## 2019-05-13 PROCEDURE — 25010000002 ENOXAPARIN PER 10 MG: Performed by: ORTHOPAEDIC SURGERY

## 2019-05-13 PROCEDURE — 85025 COMPLETE CBC W/AUTO DIFF WBC: CPT | Performed by: INTERNAL MEDICINE

## 2019-05-13 PROCEDURE — 94799 UNLISTED PULMONARY SVC/PX: CPT

## 2019-05-13 PROCEDURE — 25010000002 CEFTRIAXONE PER 250 MG: Performed by: NURSE PRACTITIONER

## 2019-05-13 PROCEDURE — 97162 PT EVAL MOD COMPLEX 30 MIN: CPT

## 2019-05-13 PROCEDURE — 25010000002 AZITHROMYCIN PER 500 MG: Performed by: NURSE PRACTITIONER

## 2019-05-13 PROCEDURE — 93306 TTE W/DOPPLER COMPLETE: CPT

## 2019-05-13 PROCEDURE — 93306 TTE W/DOPPLER COMPLETE: CPT | Performed by: INTERNAL MEDICINE

## 2019-05-13 PROCEDURE — 99233 SBSQ HOSP IP/OBS HIGH 50: CPT | Performed by: INTERNAL MEDICINE

## 2019-05-13 RX ADMIN — SODIUM CHLORIDE 100 ML/HR: 9 INJECTION, SOLUTION INTRAVENOUS at 06:01

## 2019-05-13 RX ADMIN — PROPRANOLOL HYDROCHLORIDE 40 MG: 40 TABLET ORAL at 09:44

## 2019-05-13 RX ADMIN — LEVETIRACETAM 500 MG: 500 TABLET, FILM COATED ORAL at 09:44

## 2019-05-13 RX ADMIN — ENOXAPARIN SODIUM 40 MG: 40 INJECTION SUBCUTANEOUS at 09:43

## 2019-05-13 RX ADMIN — AZITHROMYCIN MONOHYDRATE 500 MG: 500 INJECTION, POWDER, LYOPHILIZED, FOR SOLUTION INTRAVENOUS at 15:01

## 2019-05-13 RX ADMIN — IPRATROPIUM BROMIDE AND ALBUTEROL SULFATE 3 ML: 2.5; .5 SOLUTION RESPIRATORY (INHALATION) at 10:33

## 2019-05-13 RX ADMIN — METHYLPREDNISOLONE SODIUM SUCCINATE 20 MG: 40 INJECTION, POWDER, LYOPHILIZED, FOR SOLUTION INTRAMUSCULAR; INTRAVENOUS at 09:43

## 2019-05-13 RX ADMIN — LISINOPRIL 20 MG: 20 TABLET ORAL at 09:44

## 2019-05-13 RX ADMIN — IPRATROPIUM BROMIDE AND ALBUTEROL SULFATE 3 ML: 2.5; .5 SOLUTION RESPIRATORY (INHALATION) at 15:28

## 2019-05-13 RX ADMIN — LEVOTHYROXINE SODIUM 75 MCG: 75 TABLET ORAL at 05:59

## 2019-05-13 RX ADMIN — METHYLPREDNISOLONE SODIUM SUCCINATE 20 MG: 40 INJECTION, POWDER, LYOPHILIZED, FOR SOLUTION INTRAMUSCULAR; INTRAVENOUS at 21:00

## 2019-05-13 RX ADMIN — PAROXETINE HYDROCHLORIDE 10 MG: 10 TABLET, FILM COATED ORAL at 09:44

## 2019-05-13 RX ADMIN — ACETAMINOPHEN 650 MG: 325 TABLET, FILM COATED ORAL at 21:16

## 2019-05-13 RX ADMIN — IPRATROPIUM BROMIDE AND ALBUTEROL SULFATE 3 ML: 2.5; .5 SOLUTION RESPIRATORY (INHALATION) at 20:16

## 2019-05-13 RX ADMIN — ATORVASTATIN CALCIUM 20 MG: 20 TABLET, FILM COATED ORAL at 09:44

## 2019-05-13 RX ADMIN — LEVETIRACETAM 500 MG: 500 TABLET, FILM COATED ORAL at 21:00

## 2019-05-13 RX ADMIN — PROPRANOLOL HYDROCHLORIDE 40 MG: 40 TABLET ORAL at 20:59

## 2019-05-13 RX ADMIN — CEFTRIAXONE SODIUM 1 G: 1 INJECTION, SOLUTION INTRAVENOUS at 12:12

## 2019-05-13 RX ADMIN — CLINDAMYCIN PHOSPHATE 900 MG: 900 INJECTION, SOLUTION INTRAVENOUS at 03:23

## 2019-05-14 ENCOUNTER — APPOINTMENT (OUTPATIENT)
Dept: GENERAL RADIOLOGY | Facility: HOSPITAL | Age: 79
End: 2019-05-14

## 2019-05-14 ENCOUNTER — APPOINTMENT (OUTPATIENT)
Dept: OCCUPATIONAL THERAPY | Facility: HOSPITAL | Age: 79
End: 2019-05-14

## 2019-05-14 LAB
ANION GAP SERPL CALCULATED.3IONS-SCNC: 5.8 MMOL/L
BASOPHILS # BLD AUTO: 0.01 10*3/MM3 (ref 0–0.2)
BASOPHILS NFR BLD AUTO: 0.1 % (ref 0–1.5)
BUN BLD-MCNC: 37 MG/DL (ref 8–23)
BUN/CREAT SERPL: 56.1 (ref 7–25)
CALCIUM SPEC-SCNC: 9 MG/DL (ref 8.6–10.5)
CHLORIDE SERPL-SCNC: 106 MMOL/L (ref 98–107)
CO2 SERPL-SCNC: 26.2 MMOL/L (ref 22–29)
CREAT BLD-MCNC: 0.66 MG/DL (ref 0.57–1)
CYTO UR: NORMAL
DEPRECATED RDW RBC AUTO: 46.1 FL (ref 37–54)
EOSINOPHIL # BLD AUTO: 0 10*3/MM3 (ref 0–0.4)
EOSINOPHIL NFR BLD AUTO: 0 % (ref 0.3–6.2)
ERYTHROCYTE [DISTWIDTH] IN BLOOD BY AUTOMATED COUNT: 14 % (ref 12.3–15.4)
GFR SERPL CREATININE-BSD FRML MDRD: 86 ML/MIN/1.73
GLUCOSE BLD-MCNC: 151 MG/DL (ref 65–99)
HCT VFR BLD AUTO: 34 % (ref 34–46.6)
HGB BLD-MCNC: 10.6 G/DL (ref 12–15.9)
IMM GRANULOCYTES # BLD AUTO: 0.32 10*3/MM3 (ref 0–0.05)
IMM GRANULOCYTES NFR BLD AUTO: 3 % (ref 0–0.5)
LAB AP CASE REPORT: NORMAL
LYMPHOCYTES # BLD AUTO: 0.69 10*3/MM3 (ref 0.7–3.1)
LYMPHOCYTES NFR BLD AUTO: 6.4 % (ref 19.6–45.3)
MCH RBC QN AUTO: 28 PG (ref 26.6–33)
MCHC RBC AUTO-ENTMCNC: 31.2 G/DL (ref 31.5–35.7)
MCV RBC AUTO: 89.7 FL (ref 79–97)
MONOCYTES # BLD AUTO: 0.71 10*3/MM3 (ref 0.1–0.9)
MONOCYTES NFR BLD AUTO: 6.6 % (ref 5–12)
NEUTROPHILS # BLD AUTO: 9.05 10*3/MM3 (ref 1.7–7)
NEUTROPHILS NFR BLD AUTO: 83.9 % (ref 42.7–76)
NRBC BLD AUTO-RTO: 0 /100 WBC (ref 0–0.2)
PATH REPORT.FINAL DX SPEC: NORMAL
PATH REPORT.GROSS SPEC: NORMAL
PLATELET # BLD AUTO: 197 10*3/MM3 (ref 140–450)
PMV BLD AUTO: 12.6 FL (ref 6–12)
POTASSIUM BLD-SCNC: 3.9 MMOL/L (ref 3.5–5.2)
RBC # BLD AUTO: 3.79 10*6/MM3 (ref 3.77–5.28)
SODIUM BLD-SCNC: 138 MMOL/L (ref 136–145)
WBC NRBC COR # BLD: 10.78 10*3/MM3 (ref 3.4–10.8)

## 2019-05-14 PROCEDURE — 71045 X-RAY EXAM CHEST 1 VIEW: CPT

## 2019-05-14 PROCEDURE — 94799 UNLISTED PULMONARY SVC/PX: CPT

## 2019-05-14 PROCEDURE — 25010000002 METHYLPREDNISOLONE PER 40 MG: Performed by: INTERNAL MEDICINE

## 2019-05-14 PROCEDURE — 25010000002 CEFTRIAXONE PER 250 MG: Performed by: NURSE PRACTITIONER

## 2019-05-14 PROCEDURE — 85025 COMPLETE CBC W/AUTO DIFF WBC: CPT | Performed by: INTERNAL MEDICINE

## 2019-05-14 PROCEDURE — 97110 THERAPEUTIC EXERCISES: CPT

## 2019-05-14 PROCEDURE — 25010000002 ENOXAPARIN PER 10 MG: Performed by: ORTHOPAEDIC SURGERY

## 2019-05-14 PROCEDURE — 25010000002 FUROSEMIDE PER 20 MG: Performed by: INTERNAL MEDICINE

## 2019-05-14 PROCEDURE — 80048 BASIC METABOLIC PNL TOTAL CA: CPT | Performed by: INTERNAL MEDICINE

## 2019-05-14 PROCEDURE — 25010000002 AZITHROMYCIN PER 500 MG: Performed by: NURSE PRACTITIONER

## 2019-05-14 RX ORDER — FUROSEMIDE 10 MG/ML
40 INJECTION INTRAMUSCULAR; INTRAVENOUS ONCE
Status: COMPLETED | OUTPATIENT
Start: 2019-05-14 | End: 2019-05-14

## 2019-05-14 RX ADMIN — METHYLPREDNISOLONE SODIUM SUCCINATE 20 MG: 40 INJECTION, POWDER, LYOPHILIZED, FOR SOLUTION INTRAMUSCULAR; INTRAVENOUS at 20:37

## 2019-05-14 RX ADMIN — IPRATROPIUM BROMIDE AND ALBUTEROL SULFATE 3 ML: 2.5; .5 SOLUTION RESPIRATORY (INHALATION) at 10:48

## 2019-05-14 RX ADMIN — PROPRANOLOL HYDROCHLORIDE 40 MG: 40 TABLET ORAL at 08:29

## 2019-05-14 RX ADMIN — LEVOTHYROXINE SODIUM 75 MCG: 75 TABLET ORAL at 06:28

## 2019-05-14 RX ADMIN — IPRATROPIUM BROMIDE AND ALBUTEROL SULFATE 3 ML: 2.5; .5 SOLUTION RESPIRATORY (INHALATION) at 07:11

## 2019-05-14 RX ADMIN — AZITHROMYCIN MONOHYDRATE 500 MG: 500 INJECTION, POWDER, LYOPHILIZED, FOR SOLUTION INTRAVENOUS at 14:18

## 2019-05-14 RX ADMIN — PROPRANOLOL HYDROCHLORIDE 40 MG: 40 TABLET ORAL at 20:37

## 2019-05-14 RX ADMIN — METHYLPREDNISOLONE SODIUM SUCCINATE 20 MG: 40 INJECTION, POWDER, LYOPHILIZED, FOR SOLUTION INTRAMUSCULAR; INTRAVENOUS at 08:28

## 2019-05-14 RX ADMIN — ACETAMINOPHEN 650 MG: 325 TABLET, FILM COATED ORAL at 03:42

## 2019-05-14 RX ADMIN — ACETAMINOPHEN 650 MG: 325 TABLET, FILM COATED ORAL at 20:46

## 2019-05-14 RX ADMIN — IPRATROPIUM BROMIDE AND ALBUTEROL SULFATE 3 ML: 2.5; .5 SOLUTION RESPIRATORY (INHALATION) at 15:29

## 2019-05-14 RX ADMIN — FUROSEMIDE 40 MG: 10 INJECTION, SOLUTION INTRAMUSCULAR; INTRAVENOUS at 20:46

## 2019-05-14 RX ADMIN — ENOXAPARIN SODIUM 40 MG: 40 INJECTION SUBCUTANEOUS at 08:28

## 2019-05-14 RX ADMIN — LEVETIRACETAM 500 MG: 500 TABLET, FILM COATED ORAL at 20:38

## 2019-05-14 RX ADMIN — LEVETIRACETAM 500 MG: 500 TABLET, FILM COATED ORAL at 08:29

## 2019-05-14 RX ADMIN — PAROXETINE HYDROCHLORIDE 10 MG: 10 TABLET, FILM COATED ORAL at 08:29

## 2019-05-14 RX ADMIN — CEFTRIAXONE SODIUM 1 G: 1 INJECTION, SOLUTION INTRAVENOUS at 11:57

## 2019-05-14 RX ADMIN — ACETAMINOPHEN 650 MG: 325 TABLET, FILM COATED ORAL at 14:17

## 2019-05-14 RX ADMIN — LISINOPRIL 20 MG: 20 TABLET ORAL at 08:29

## 2019-05-14 RX ADMIN — ATORVASTATIN CALCIUM 20 MG: 20 TABLET, FILM COATED ORAL at 08:29

## 2019-05-14 RX ADMIN — IPRATROPIUM BROMIDE AND ALBUTEROL SULFATE 3 ML: 2.5; .5 SOLUTION RESPIRATORY (INHALATION) at 19:47

## 2019-05-14 NOTE — ANESTHESIA POSTPROCEDURE EVALUATION
"Patient: Karolina Ornelas    Procedure Summary     Date:  05/12/19 Room / Location:  [facility] OR  / [facility] MAIN OR    Anesthesia Start:  1243 Anesthesia Stop:  1555    Procedure:  LEFT ANTERIOR TOTAL HIP ARTHROPLASTY (Left Hip) Diagnosis:       Closed fracture of intracapsular section of neck of left femur (CMS/HCC)      (Closed fracture of intracapsular section of neck of left femur)    Surgeon:  Jak Hdz MD Provider:  Chi Herndon MD    Anesthesia Type:  spinal, general ASA Status:  4          Anesthesia Type: spinal, general  Last vitals  BP   126/66 (05/14/19 0012)   Temp   36.4 °C (97.5 °F) (05/14/19 0728)   Pulse   79 (05/14/19 0714)   Resp   18 (05/14/19 0728)     SpO2   98 % (05/14/19 0711)     Post Anesthesia Care and Evaluation    Patient location during evaluation: bedside  Patient participation: complete - patient participated  Level of consciousness: awake and alert  Pain management: adequate  Airway patency: patent  Anesthetic complications: No anesthetic complications    Cardiovascular status: acceptable  Respiratory status: acceptable  Hydration status: acceptable    Comments: /66 (BP Location: Left arm, Patient Position: Lying)   Pulse 79   Temp 36.4 °C (97.5 °F) (Oral)   Resp 18   Ht 165 cm (64.96\")   Wt 78.2 kg (172 lb 8 oz)   SpO2 98%   BMI 28.74 kg/m²       "

## 2019-05-15 PROBLEM — B37.0 ORAL THRUSH: Status: ACTIVE | Noted: 2019-05-15

## 2019-05-15 LAB
ANION GAP SERPL CALCULATED.3IONS-SCNC: 8.8 MMOL/L
ANISOCYTOSIS BLD QL: ABNORMAL
BACTERIA SPEC AEROBE CULT: NORMAL
BACTERIA SPEC AEROBE CULT: NORMAL
BASOPHILS # BLD MANUAL: 0.13 10*3/MM3 (ref 0–0.2)
BASOPHILS NFR BLD AUTO: 1 % (ref 0–1.5)
BUN BLD-MCNC: 25 MG/DL (ref 8–23)
BUN/CREAT SERPL: 45.5 (ref 7–25)
BURR CELLS BLD QL SMEAR: ABNORMAL
CALCIUM SPEC-SCNC: 9.2 MG/DL (ref 8.6–10.5)
CHLORIDE SERPL-SCNC: 109 MMOL/L (ref 98–107)
CO2 SERPL-SCNC: 26.2 MMOL/L (ref 22–29)
CREAT BLD-MCNC: 0.55 MG/DL (ref 0.57–1)
DEPRECATED RDW RBC AUTO: 50.2 FL (ref 37–54)
ELLIPTOCYTES BLD QL SMEAR: ABNORMAL
ERYTHROCYTE [DISTWIDTH] IN BLOOD BY AUTOMATED COUNT: 14.3 % (ref 12.3–15.4)
GFR SERPL CREATININE-BSD FRML MDRD: 106 ML/MIN/1.73
GLUCOSE BLD-MCNC: 149 MG/DL (ref 65–99)
HCT VFR BLD AUTO: 36.1 % (ref 34–46.6)
HGB BLD-MCNC: 10.5 G/DL (ref 12–15.9)
LYMPHOCYTES # BLD MANUAL: 0.92 10*3/MM3 (ref 0.7–3.1)
LYMPHOCYTES NFR BLD MANUAL: 1 % (ref 5–12)
LYMPHOCYTES NFR BLD MANUAL: 7 % (ref 19.6–45.3)
MCH RBC QN AUTO: 27.9 PG (ref 26.6–33)
MCHC RBC AUTO-ENTMCNC: 29.1 G/DL (ref 31.5–35.7)
MCV RBC AUTO: 95.8 FL (ref 79–97)
MONOCYTES # BLD AUTO: 0.13 10*3/MM3 (ref 0.1–0.9)
MYELOCYTES NFR BLD MANUAL: 1 % (ref 0–0)
NEUTROPHILS # BLD AUTO: 11.8 10*3/MM3 (ref 1.7–7)
NEUTROPHILS NFR BLD MANUAL: 90 % (ref 42.7–76)
NRBC SPEC MANUAL: 1 /100 WBC (ref 0–0.2)
NT-PROBNP SERPL-MCNC: 2731 PG/ML (ref 5–1800)
PLAT MORPH BLD: NORMAL
PLATELET # BLD AUTO: 237 10*3/MM3 (ref 140–450)
PMV BLD AUTO: 11.7 FL (ref 6–12)
POIKILOCYTOSIS BLD QL SMEAR: ABNORMAL
POTASSIUM BLD-SCNC: 3.8 MMOL/L (ref 3.5–5.2)
RBC # BLD AUTO: 3.77 10*6/MM3 (ref 3.77–5.28)
SODIUM BLD-SCNC: 144 MMOL/L (ref 136–145)
WBC MORPH BLD: NORMAL
WBC NRBC COR # BLD: 13.11 10*3/MM3 (ref 3.4–10.8)

## 2019-05-15 PROCEDURE — 25010000002 AZITHROMYCIN PER 500 MG: Performed by: NURSE PRACTITIONER

## 2019-05-15 PROCEDURE — 83880 ASSAY OF NATRIURETIC PEPTIDE: CPT | Performed by: INTERNAL MEDICINE

## 2019-05-15 PROCEDURE — 85007 BL SMEAR W/DIFF WBC COUNT: CPT | Performed by: INTERNAL MEDICINE

## 2019-05-15 PROCEDURE — 25010000002 FUROSEMIDE PER 20 MG: Performed by: INTERNAL MEDICINE

## 2019-05-15 PROCEDURE — 25010000002 ENOXAPARIN PER 10 MG: Performed by: ORTHOPAEDIC SURGERY

## 2019-05-15 PROCEDURE — 25010000002 CEFTRIAXONE PER 250 MG: Performed by: NURSE PRACTITIONER

## 2019-05-15 PROCEDURE — 85025 COMPLETE CBC W/AUTO DIFF WBC: CPT | Performed by: INTERNAL MEDICINE

## 2019-05-15 PROCEDURE — 80048 BASIC METABOLIC PNL TOTAL CA: CPT | Performed by: INTERNAL MEDICINE

## 2019-05-15 PROCEDURE — 94799 UNLISTED PULMONARY SVC/PX: CPT

## 2019-05-15 PROCEDURE — 25010000002 METHYLPREDNISOLONE PER 40 MG: Performed by: INTERNAL MEDICINE

## 2019-05-15 PROCEDURE — 97110 THERAPEUTIC EXERCISES: CPT

## 2019-05-15 RX ORDER — FUROSEMIDE 10 MG/ML
40 INJECTION INTRAMUSCULAR; INTRAVENOUS ONCE
Status: COMPLETED | OUTPATIENT
Start: 2019-05-15 | End: 2019-05-15

## 2019-05-15 RX ORDER — FLUTICASONE PROPIONATE 50 MCG
2 SPRAY, SUSPENSION (ML) NASAL DAILY
Status: DISCONTINUED | OUTPATIENT
Start: 2019-05-15 | End: 2019-05-19 | Stop reason: HOSPADM

## 2019-05-15 RX ORDER — FUROSEMIDE 40 MG/1
40 TABLET ORAL DAILY
Status: DISCONTINUED | OUTPATIENT
Start: 2019-05-16 | End: 2019-05-19 | Stop reason: HOSPADM

## 2019-05-15 RX ORDER — PREDNISONE 20 MG/1
40 TABLET ORAL
Status: COMPLETED | OUTPATIENT
Start: 2019-05-16 | End: 2019-05-17

## 2019-05-15 RX ADMIN — ATORVASTATIN CALCIUM 20 MG: 20 TABLET, FILM COATED ORAL at 08:49

## 2019-05-15 RX ADMIN — PROPRANOLOL HYDROCHLORIDE 40 MG: 40 TABLET ORAL at 22:19

## 2019-05-15 RX ADMIN — PROPRANOLOL HYDROCHLORIDE 40 MG: 40 TABLET ORAL at 08:49

## 2019-05-15 RX ADMIN — LEVETIRACETAM 500 MG: 500 TABLET, FILM COATED ORAL at 22:19

## 2019-05-15 RX ADMIN — IPRATROPIUM BROMIDE AND ALBUTEROL SULFATE 3 ML: 2.5; .5 SOLUTION RESPIRATORY (INHALATION) at 11:25

## 2019-05-15 RX ADMIN — CEFTRIAXONE SODIUM 1 G: 1 INJECTION, SOLUTION INTRAVENOUS at 12:22

## 2019-05-15 RX ADMIN — METHYLPREDNISOLONE SODIUM SUCCINATE 20 MG: 40 INJECTION, POWDER, LYOPHILIZED, FOR SOLUTION INTRAMUSCULAR; INTRAVENOUS at 08:50

## 2019-05-15 RX ADMIN — IPRATROPIUM BROMIDE AND ALBUTEROL SULFATE 3 ML: 2.5; .5 SOLUTION RESPIRATORY (INHALATION) at 21:02

## 2019-05-15 RX ADMIN — LEVOTHYROXINE SODIUM 75 MCG: 75 TABLET ORAL at 05:27

## 2019-05-15 RX ADMIN — LISINOPRIL 20 MG: 20 TABLET ORAL at 08:49

## 2019-05-15 RX ADMIN — ENOXAPARIN SODIUM 40 MG: 40 INJECTION SUBCUTANEOUS at 08:50

## 2019-05-15 RX ADMIN — ACETAMINOPHEN 650 MG: 325 TABLET, FILM COATED ORAL at 04:13

## 2019-05-15 RX ADMIN — AZITHROMYCIN MONOHYDRATE 500 MG: 500 INJECTION, POWDER, LYOPHILIZED, FOR SOLUTION INTRAVENOUS at 14:19

## 2019-05-15 RX ADMIN — FLUTICASONE PROPIONATE 2 SPRAY: 50 SPRAY, METERED NASAL at 15:49

## 2019-05-15 RX ADMIN — IPRATROPIUM BROMIDE AND ALBUTEROL SULFATE 3 ML: 2.5; .5 SOLUTION RESPIRATORY (INHALATION) at 07:18

## 2019-05-15 RX ADMIN — FUROSEMIDE 40 MG: 10 INJECTION, SOLUTION INTRAMUSCULAR; INTRAVENOUS at 17:27

## 2019-05-15 RX ADMIN — LEVETIRACETAM 500 MG: 500 TABLET, FILM COATED ORAL at 08:49

## 2019-05-15 RX ADMIN — IPRATROPIUM BROMIDE AND ALBUTEROL SULFATE 3 ML: 2.5; .5 SOLUTION RESPIRATORY (INHALATION) at 15:01

## 2019-05-15 RX ADMIN — PAROXETINE HYDROCHLORIDE 10 MG: 10 TABLET, FILM COATED ORAL at 08:50

## 2019-05-15 RX ADMIN — NYSTATIN 500000 UNITS: 500000 SUSPENSION ORAL at 17:27

## 2019-05-16 ENCOUNTER — APPOINTMENT (OUTPATIENT)
Dept: OCCUPATIONAL THERAPY | Facility: HOSPITAL | Age: 79
End: 2019-05-16

## 2019-05-16 PROBLEM — R33.9 URINE RETENTION: Status: ACTIVE | Noted: 2019-05-16

## 2019-05-16 LAB
ANION GAP SERPL CALCULATED.3IONS-SCNC: 10 MMOL/L
ANISOCYTOSIS BLD QL: ABNORMAL
BUN BLD-MCNC: 19 MG/DL (ref 8–23)
BUN/CREAT SERPL: 31.7 (ref 7–25)
CALCIUM SPEC-SCNC: 9.5 MG/DL (ref 8.6–10.5)
CHLORIDE SERPL-SCNC: 105 MMOL/L (ref 98–107)
CO2 SERPL-SCNC: 30 MMOL/L (ref 22–29)
CREAT BLD-MCNC: 0.6 MG/DL (ref 0.57–1)
DEPRECATED RDW RBC AUTO: 45.6 FL (ref 37–54)
ERYTHROCYTE [DISTWIDTH] IN BLOOD BY AUTOMATED COUNT: 13.9 % (ref 12.3–15.4)
GFR SERPL CREATININE-BSD FRML MDRD: 96 ML/MIN/1.73
GLUCOSE BLD-MCNC: 108 MG/DL (ref 65–99)
HCT VFR BLD AUTO: 33.7 % (ref 34–46.6)
HGB BLD-MCNC: 10.5 G/DL (ref 12–15.9)
LYMPHOCYTES # BLD MANUAL: 0.89 10*3/MM3 (ref 0.7–3.1)
LYMPHOCYTES NFR BLD MANUAL: 6 % (ref 19.6–45.3)
LYMPHOCYTES NFR BLD MANUAL: 6 % (ref 5–12)
MCH RBC QN AUTO: 28 PG (ref 26.6–33)
MCHC RBC AUTO-ENTMCNC: 31.2 G/DL (ref 31.5–35.7)
MCV RBC AUTO: 89.9 FL (ref 79–97)
MICROCYTES BLD QL: ABNORMAL
MONOCYTES # BLD AUTO: 0.89 10*3/MM3 (ref 0.1–0.9)
NEUTROPHILS # BLD AUTO: 13.02 10*3/MM3 (ref 1.7–7)
NEUTROPHILS NFR BLD MANUAL: 88 % (ref 42.7–76)
PLAT MORPH BLD: NORMAL
PLATELET # BLD AUTO: 267 10*3/MM3 (ref 140–450)
PMV BLD AUTO: 11.8 FL (ref 6–12)
POIKILOCYTOSIS BLD QL SMEAR: ABNORMAL
POTASSIUM BLD-SCNC: 3.3 MMOL/L (ref 3.5–5.2)
RBC # BLD AUTO: 3.75 10*6/MM3 (ref 3.77–5.28)
SODIUM BLD-SCNC: 145 MMOL/L (ref 136–145)
WBC MORPH BLD: NORMAL
WBC NRBC COR # BLD: 14.8 10*3/MM3 (ref 3.4–10.8)

## 2019-05-16 PROCEDURE — 85025 COMPLETE CBC W/AUTO DIFF WBC: CPT | Performed by: INTERNAL MEDICINE

## 2019-05-16 PROCEDURE — 94799 UNLISTED PULMONARY SVC/PX: CPT

## 2019-05-16 PROCEDURE — 25010000002 FUROSEMIDE PER 20 MG: Performed by: INTERNAL MEDICINE

## 2019-05-16 PROCEDURE — 25010000002 ENOXAPARIN PER 10 MG: Performed by: ORTHOPAEDIC SURGERY

## 2019-05-16 PROCEDURE — 25010000002 CEFTRIAXONE PER 250 MG: Performed by: NURSE PRACTITIONER

## 2019-05-16 PROCEDURE — 97110 THERAPEUTIC EXERCISES: CPT

## 2019-05-16 PROCEDURE — 80048 BASIC METABOLIC PNL TOTAL CA: CPT | Performed by: INTERNAL MEDICINE

## 2019-05-16 PROCEDURE — 85007 BL SMEAR W/DIFF WBC COUNT: CPT | Performed by: INTERNAL MEDICINE

## 2019-05-16 PROCEDURE — 63710000001 PREDNISONE PER 1 MG: Performed by: INTERNAL MEDICINE

## 2019-05-16 RX ORDER — CHOLECALCIFEROL (VITAMIN D3) 125 MCG
5 CAPSULE ORAL NIGHTLY
Status: DISCONTINUED | OUTPATIENT
Start: 2019-05-16 | End: 2019-05-19 | Stop reason: HOSPADM

## 2019-05-16 RX ORDER — FUROSEMIDE 10 MG/ML
40 INJECTION INTRAMUSCULAR; INTRAVENOUS ONCE
Status: COMPLETED | OUTPATIENT
Start: 2019-05-16 | End: 2019-05-16

## 2019-05-16 RX ADMIN — LEVETIRACETAM 500 MG: 500 TABLET, FILM COATED ORAL at 08:16

## 2019-05-16 RX ADMIN — FLUTICASONE PROPIONATE 2 SPRAY: 50 SPRAY, METERED NASAL at 08:16

## 2019-05-16 RX ADMIN — ACETAMINOPHEN 650 MG: 325 TABLET, FILM COATED ORAL at 18:53

## 2019-05-16 RX ADMIN — LISINOPRIL 20 MG: 20 TABLET ORAL at 08:16

## 2019-05-16 RX ADMIN — CEFTRIAXONE SODIUM 1 G: 1 INJECTION, SOLUTION INTRAVENOUS at 18:11

## 2019-05-16 RX ADMIN — PREDNISONE 40 MG: 20 TABLET ORAL at 08:16

## 2019-05-16 RX ADMIN — LEVETIRACETAM 500 MG: 500 TABLET, FILM COATED ORAL at 20:39

## 2019-05-16 RX ADMIN — HYDROCODONE BITARTRATE AND ACETAMINOPHEN 1 TABLET: 5; 325 TABLET ORAL at 07:51

## 2019-05-16 RX ADMIN — ATORVASTATIN CALCIUM 20 MG: 20 TABLET, FILM COATED ORAL at 08:16

## 2019-05-16 RX ADMIN — ENOXAPARIN SODIUM 40 MG: 40 INJECTION SUBCUTANEOUS at 08:16

## 2019-05-16 RX ADMIN — IPRATROPIUM BROMIDE AND ALBUTEROL SULFATE 3 ML: 2.5; .5 SOLUTION RESPIRATORY (INHALATION) at 15:01

## 2019-05-16 RX ADMIN — POTASSIUM CHLORIDE 40 MEQ: 750 CAPSULE, EXTENDED RELEASE ORAL at 20:39

## 2019-05-16 RX ADMIN — PAROXETINE HYDROCHLORIDE 10 MG: 10 TABLET, FILM COATED ORAL at 08:16

## 2019-05-16 RX ADMIN — IPRATROPIUM BROMIDE AND ALBUTEROL SULFATE 3 ML: 2.5; .5 SOLUTION RESPIRATORY (INHALATION) at 07:02

## 2019-05-16 RX ADMIN — IPRATROPIUM BROMIDE AND ALBUTEROL SULFATE 3 ML: 2.5; .5 SOLUTION RESPIRATORY (INHALATION) at 10:33

## 2019-05-16 RX ADMIN — Medication 5 MG: at 20:39

## 2019-05-16 RX ADMIN — FUROSEMIDE 40 MG: 10 INJECTION, SOLUTION INTRAMUSCULAR; INTRAVENOUS at 18:16

## 2019-05-16 RX ADMIN — NYSTATIN 500000 UNITS: 500000 SUSPENSION ORAL at 18:16

## 2019-05-16 RX ADMIN — NYSTATIN 500000 UNITS: 500000 SUSPENSION ORAL at 02:12

## 2019-05-16 RX ADMIN — IPRATROPIUM BROMIDE AND ALBUTEROL SULFATE 3 ML: 2.5; .5 SOLUTION RESPIRATORY (INHALATION) at 19:31

## 2019-05-16 RX ADMIN — LEVOTHYROXINE SODIUM 75 MCG: 75 TABLET ORAL at 08:20

## 2019-05-16 RX ADMIN — FUROSEMIDE 40 MG: 40 TABLET ORAL at 08:16

## 2019-05-16 RX ADMIN — NYSTATIN 500000 UNITS: 500000 SUSPENSION ORAL at 14:00

## 2019-05-16 RX ADMIN — NYSTATIN 500000 UNITS: 500000 SUSPENSION ORAL at 20:39

## 2019-05-16 RX ADMIN — NYSTATIN 500000 UNITS: 500000 SUSPENSION ORAL at 08:16

## 2019-05-16 RX ADMIN — PROPRANOLOL HYDROCHLORIDE 40 MG: 40 TABLET ORAL at 20:39

## 2019-05-16 RX ADMIN — PROPRANOLOL HYDROCHLORIDE 40 MG: 40 TABLET ORAL at 08:16

## 2019-05-17 ENCOUNTER — APPOINTMENT (OUTPATIENT)
Dept: OCCUPATIONAL THERAPY | Facility: HOSPITAL | Age: 79
End: 2019-05-17

## 2019-05-17 LAB
ANION GAP SERPL CALCULATED.3IONS-SCNC: 10.2 MMOL/L
BUN BLD-MCNC: 26 MG/DL (ref 8–23)
BUN/CREAT SERPL: 38.8 (ref 7–25)
CALCIUM SPEC-SCNC: 9.9 MG/DL (ref 8.6–10.5)
CHLORIDE SERPL-SCNC: 103 MMOL/L (ref 98–107)
CO2 SERPL-SCNC: 32.8 MMOL/L (ref 22–29)
CREAT BLD-MCNC: 0.67 MG/DL (ref 0.57–1)
GFR SERPL CREATININE-BSD FRML MDRD: 84 ML/MIN/1.73
GLUCOSE BLD-MCNC: 95 MG/DL (ref 65–99)
POTASSIUM BLD-SCNC: 3.8 MMOL/L (ref 3.5–5.2)
SODIUM BLD-SCNC: 146 MMOL/L (ref 136–145)

## 2019-05-17 PROCEDURE — 94799 UNLISTED PULMONARY SVC/PX: CPT

## 2019-05-17 PROCEDURE — 25010000002 FUROSEMIDE PER 20 MG: Performed by: INTERNAL MEDICINE

## 2019-05-17 PROCEDURE — 25010000002 ENOXAPARIN PER 10 MG: Performed by: ORTHOPAEDIC SURGERY

## 2019-05-17 PROCEDURE — 63710000001 PREDNISONE PER 1 MG: Performed by: INTERNAL MEDICINE

## 2019-05-17 PROCEDURE — 80048 BASIC METABOLIC PNL TOTAL CA: CPT | Performed by: INTERNAL MEDICINE

## 2019-05-17 PROCEDURE — 97110 THERAPEUTIC EXERCISES: CPT

## 2019-05-17 PROCEDURE — 25010000002 CEFTRIAXONE PER 250 MG: Performed by: NURSE PRACTITIONER

## 2019-05-17 RX ORDER — FUROSEMIDE 10 MG/ML
40 INJECTION INTRAMUSCULAR; INTRAVENOUS ONCE
Status: COMPLETED | OUTPATIENT
Start: 2019-05-17 | End: 2019-05-17

## 2019-05-17 RX ADMIN — NYSTATIN 500000 UNITS: 500000 SUSPENSION ORAL at 09:46

## 2019-05-17 RX ADMIN — PROPRANOLOL HYDROCHLORIDE 40 MG: 40 TABLET ORAL at 09:47

## 2019-05-17 RX ADMIN — LEVETIRACETAM 500 MG: 500 TABLET, FILM COATED ORAL at 09:45

## 2019-05-17 RX ADMIN — NYSTATIN 500000 UNITS: 500000 SUSPENSION ORAL at 18:26

## 2019-05-17 RX ADMIN — Medication 5 MG: at 20:55

## 2019-05-17 RX ADMIN — CEFTRIAXONE SODIUM 1 G: 1 INJECTION, SOLUTION INTRAVENOUS at 13:30

## 2019-05-17 RX ADMIN — ENOXAPARIN SODIUM 40 MG: 40 INJECTION SUBCUTANEOUS at 09:44

## 2019-05-17 RX ADMIN — FLUTICASONE PROPIONATE 2 SPRAY: 50 SPRAY, METERED NASAL at 09:46

## 2019-05-17 RX ADMIN — NYSTATIN 500000 UNITS: 500000 SUSPENSION ORAL at 20:58

## 2019-05-17 RX ADMIN — FUROSEMIDE 40 MG: 40 TABLET ORAL at 09:45

## 2019-05-17 RX ADMIN — IPRATROPIUM BROMIDE AND ALBUTEROL SULFATE 3 ML: 2.5; .5 SOLUTION RESPIRATORY (INHALATION) at 15:39

## 2019-05-17 RX ADMIN — PAROXETINE HYDROCHLORIDE 10 MG: 10 TABLET, FILM COATED ORAL at 09:46

## 2019-05-17 RX ADMIN — FUROSEMIDE 40 MG: 10 INJECTION, SOLUTION INTRAMUSCULAR; INTRAVENOUS at 18:26

## 2019-05-17 RX ADMIN — NYSTATIN 500000 UNITS: 500000 SUSPENSION ORAL at 13:30

## 2019-05-17 RX ADMIN — PROPRANOLOL HYDROCHLORIDE 40 MG: 40 TABLET ORAL at 20:55

## 2019-05-17 RX ADMIN — IPRATROPIUM BROMIDE AND ALBUTEROL SULFATE 3 ML: 2.5; .5 SOLUTION RESPIRATORY (INHALATION) at 19:24

## 2019-05-17 RX ADMIN — LEVOTHYROXINE SODIUM 75 MCG: 75 TABLET ORAL at 09:47

## 2019-05-17 RX ADMIN — LEVETIRACETAM 500 MG: 500 TABLET, FILM COATED ORAL at 20:55

## 2019-05-17 RX ADMIN — IPRATROPIUM BROMIDE AND ALBUTEROL SULFATE 3 ML: 2.5; .5 SOLUTION RESPIRATORY (INHALATION) at 11:04

## 2019-05-17 RX ADMIN — LISINOPRIL 20 MG: 20 TABLET ORAL at 09:45

## 2019-05-17 RX ADMIN — ACETAMINOPHEN 650 MG: 325 TABLET, FILM COATED ORAL at 13:29

## 2019-05-17 RX ADMIN — PREDNISONE 40 MG: 20 TABLET ORAL at 09:45

## 2019-05-17 RX ADMIN — ATORVASTATIN CALCIUM 20 MG: 20 TABLET, FILM COATED ORAL at 09:45

## 2019-05-18 ENCOUNTER — APPOINTMENT (OUTPATIENT)
Dept: GENERAL RADIOLOGY | Facility: HOSPITAL | Age: 79
End: 2019-05-18

## 2019-05-18 LAB
ANION GAP SERPL CALCULATED.3IONS-SCNC: 8.8 MMOL/L
BUN BLD-MCNC: 28 MG/DL (ref 8–23)
BUN/CREAT SERPL: 51.9 (ref 7–25)
CALCIUM SPEC-SCNC: 9.3 MG/DL (ref 8.6–10.5)
CHLORIDE SERPL-SCNC: 99 MMOL/L (ref 98–107)
CO2 SERPL-SCNC: 32.2 MMOL/L (ref 22–29)
CREAT BLD-MCNC: 0.54 MG/DL (ref 0.57–1)
DEPRECATED RDW RBC AUTO: 48.2 FL (ref 37–54)
ERYTHROCYTE [DISTWIDTH] IN BLOOD BY AUTOMATED COUNT: 14.4 % (ref 12.3–15.4)
GFR SERPL CREATININE-BSD FRML MDRD: 108 ML/MIN/1.73
GLUCOSE BLD-MCNC: 98 MG/DL (ref 65–99)
HCT VFR BLD AUTO: 34.4 % (ref 34–46.6)
HGB BLD-MCNC: 10.5 G/DL (ref 12–15.9)
MCH RBC QN AUTO: 28.2 PG (ref 26.6–33)
MCHC RBC AUTO-ENTMCNC: 30.5 G/DL (ref 31.5–35.7)
MCV RBC AUTO: 92.2 FL (ref 79–97)
NT-PROBNP SERPL-MCNC: 675.5 PG/ML (ref 5–1800)
PLATELET # BLD AUTO: 271 10*3/MM3 (ref 140–450)
PMV BLD AUTO: 11.1 FL (ref 6–12)
POTASSIUM BLD-SCNC: 3.3 MMOL/L (ref 3.5–5.2)
RBC # BLD AUTO: 3.73 10*6/MM3 (ref 3.77–5.28)
SODIUM BLD-SCNC: 140 MMOL/L (ref 136–145)
WBC NRBC COR # BLD: 12.95 10*3/MM3 (ref 3.4–10.8)

## 2019-05-18 PROCEDURE — 94799 UNLISTED PULMONARY SVC/PX: CPT

## 2019-05-18 PROCEDURE — 97110 THERAPEUTIC EXERCISES: CPT

## 2019-05-18 PROCEDURE — 83880 ASSAY OF NATRIURETIC PEPTIDE: CPT | Performed by: INTERNAL MEDICINE

## 2019-05-18 PROCEDURE — 71045 X-RAY EXAM CHEST 1 VIEW: CPT

## 2019-05-18 PROCEDURE — 25010000002 ENOXAPARIN PER 10 MG: Performed by: ORTHOPAEDIC SURGERY

## 2019-05-18 PROCEDURE — 80048 BASIC METABOLIC PNL TOTAL CA: CPT | Performed by: INTERNAL MEDICINE

## 2019-05-18 PROCEDURE — 85027 COMPLETE CBC AUTOMATED: CPT | Performed by: INTERNAL MEDICINE

## 2019-05-18 RX ADMIN — PROPRANOLOL HYDROCHLORIDE 40 MG: 40 TABLET ORAL at 09:16

## 2019-05-18 RX ADMIN — LEVETIRACETAM 500 MG: 500 TABLET, FILM COATED ORAL at 21:12

## 2019-05-18 RX ADMIN — LEVOTHYROXINE SODIUM 75 MCG: 75 TABLET ORAL at 06:12

## 2019-05-18 RX ADMIN — IPRATROPIUM BROMIDE AND ALBUTEROL SULFATE 3 ML: 2.5; .5 SOLUTION RESPIRATORY (INHALATION) at 06:39

## 2019-05-18 RX ADMIN — FLUTICASONE PROPIONATE 2 SPRAY: 50 SPRAY, METERED NASAL at 09:18

## 2019-05-18 RX ADMIN — NYSTATIN 500000 UNITS: 500000 SUSPENSION ORAL at 13:09

## 2019-05-18 RX ADMIN — FUROSEMIDE 40 MG: 40 TABLET ORAL at 09:16

## 2019-05-18 RX ADMIN — LEVETIRACETAM 500 MG: 500 TABLET, FILM COATED ORAL at 09:16

## 2019-05-18 RX ADMIN — POTASSIUM CHLORIDE 40 MEQ: 750 CAPSULE, EXTENDED RELEASE ORAL at 19:27

## 2019-05-18 RX ADMIN — PROPRANOLOL HYDROCHLORIDE 40 MG: 40 TABLET ORAL at 21:10

## 2019-05-18 RX ADMIN — ATORVASTATIN CALCIUM 20 MG: 20 TABLET, FILM COATED ORAL at 09:16

## 2019-05-18 RX ADMIN — ACETAMINOPHEN 650 MG: 325 TABLET, FILM COATED ORAL at 07:32

## 2019-05-18 RX ADMIN — Medication 5 MG: at 21:10

## 2019-05-18 RX ADMIN — LISINOPRIL 20 MG: 20 TABLET ORAL at 09:16

## 2019-05-18 RX ADMIN — PAROXETINE HYDROCHLORIDE 10 MG: 10 TABLET, FILM COATED ORAL at 09:16

## 2019-05-18 RX ADMIN — IPRATROPIUM BROMIDE AND ALBUTEROL SULFATE 3 ML: 2.5; .5 SOLUTION RESPIRATORY (INHALATION) at 10:26

## 2019-05-18 RX ADMIN — IPRATROPIUM BROMIDE AND ALBUTEROL SULFATE 3 ML: 2.5; .5 SOLUTION RESPIRATORY (INHALATION) at 16:09

## 2019-05-18 RX ADMIN — NYSTATIN 500000 UNITS: 500000 SUSPENSION ORAL at 21:10

## 2019-05-18 RX ADMIN — IPRATROPIUM BROMIDE AND ALBUTEROL SULFATE 3 ML: 2.5; .5 SOLUTION RESPIRATORY (INHALATION) at 21:02

## 2019-05-18 RX ADMIN — NYSTATIN 500000 UNITS: 500000 SUSPENSION ORAL at 09:15

## 2019-05-18 RX ADMIN — ENOXAPARIN SODIUM 40 MG: 40 INJECTION SUBCUTANEOUS at 09:19

## 2019-05-18 RX ADMIN — NYSTATIN 500000 UNITS: 500000 SUSPENSION ORAL at 18:00

## 2019-05-19 VITALS
RESPIRATION RATE: 18 BRPM | OXYGEN SATURATION: 95 % | TEMPERATURE: 97.7 F | BODY MASS INDEX: 25.59 KG/M2 | WEIGHT: 153.6 LBS | HEART RATE: 89 BPM | HEIGHT: 65 IN | SYSTOLIC BLOOD PRESSURE: 110 MMHG | DIASTOLIC BLOOD PRESSURE: 60 MMHG

## 2019-05-19 PROBLEM — J18.9 COMMUNITY ACQUIRED PNEUMONIA OF LEFT LUNG: Status: RESOLVED | Noted: 2019-05-10 | Resolved: 2019-05-19

## 2019-05-19 PROBLEM — J21.1 ACUTE BRONCHIOLITIS DUE TO HUMAN METAPNEUMOVIRUS: Status: ACTIVE | Noted: 2019-05-19

## 2019-05-19 PROBLEM — M62.82 NON-TRAUMATIC RHABDOMYOLYSIS: Status: RESOLVED | Noted: 2019-05-10 | Resolved: 2019-05-19

## 2019-05-19 PROBLEM — E87.6 HYPOKALEMIA: Status: RESOLVED | Noted: 2018-11-08 | Resolved: 2019-05-19

## 2019-05-19 PROBLEM — R74.8 ELEVATED CK: Status: RESOLVED | Noted: 2019-05-10 | Resolved: 2019-05-19

## 2019-05-19 PROBLEM — B37.0 ORAL THRUSH: Status: RESOLVED | Noted: 2019-05-15 | Resolved: 2019-05-19

## 2019-05-19 PROBLEM — G93.41 METABOLIC ENCEPHALOPATHY: Status: RESOLVED | Noted: 2019-05-13 | Resolved: 2019-05-19

## 2019-05-19 PROBLEM — R33.9 URINE RETENTION: Status: RESOLVED | Noted: 2019-05-16 | Resolved: 2019-05-19

## 2019-05-19 LAB
ANION GAP SERPL CALCULATED.3IONS-SCNC: 7.3 MMOL/L
BUN BLD-MCNC: 29 MG/DL (ref 8–23)
BUN/CREAT SERPL: 55.8 (ref 7–25)
CALCIUM SPEC-SCNC: 9.8 MG/DL (ref 8.6–10.5)
CHLORIDE SERPL-SCNC: 105 MMOL/L (ref 98–107)
CO2 SERPL-SCNC: 28.7 MMOL/L (ref 22–29)
CREAT BLD-MCNC: 0.52 MG/DL (ref 0.57–1)
GFR SERPL CREATININE-BSD FRML MDRD: 113 ML/MIN/1.73
GLUCOSE BLD-MCNC: 111 MG/DL (ref 65–99)
POTASSIUM BLD-SCNC: 4.4 MMOL/L (ref 3.5–5.2)
SODIUM BLD-SCNC: 141 MMOL/L (ref 136–145)

## 2019-05-19 PROCEDURE — 25010000002 ENOXAPARIN PER 10 MG: Performed by: ORTHOPAEDIC SURGERY

## 2019-05-19 PROCEDURE — 94799 UNLISTED PULMONARY SVC/PX: CPT

## 2019-05-19 PROCEDURE — 80048 BASIC METABOLIC PNL TOTAL CA: CPT | Performed by: INTERNAL MEDICINE

## 2019-05-19 RX ORDER — GUAIFENESIN 600 MG/1
600 TABLET, EXTENDED RELEASE ORAL 2 TIMES DAILY
Start: 2019-05-19

## 2019-05-19 RX ORDER — FUROSEMIDE 40 MG/1
40 TABLET ORAL DAILY
Start: 2019-05-20 | End: 2019-07-08 | Stop reason: SDUPTHER

## 2019-05-19 RX ORDER — HYDROCODONE BITARTRATE AND ACETAMINOPHEN 7.5; 325 MG/1; MG/1
1 TABLET ORAL EVERY 4 HOURS PRN
Qty: 20 TABLET | Refills: 0 | Status: SHIPPED | OUTPATIENT
Start: 2019-05-19 | End: 2019-05-22

## 2019-05-19 RX ORDER — ACETAMINOPHEN 325 MG/1
650 TABLET ORAL EVERY 6 HOURS PRN
Start: 2019-05-19

## 2019-05-19 RX ORDER — IPRATROPIUM BROMIDE AND ALBUTEROL SULFATE 2.5; .5 MG/3ML; MG/3ML
3 SOLUTION RESPIRATORY (INHALATION)
Qty: 360 ML
Start: 2019-05-19 | End: 2019-06-19

## 2019-05-19 RX ORDER — FLUTICASONE PROPIONATE 50 MCG
2 SPRAY, SUSPENSION (ML) NASAL DAILY
Start: 2019-05-20

## 2019-05-19 RX ORDER — LISINOPRIL 20 MG/1
20 TABLET ORAL
Start: 2019-05-20 | End: 2019-07-08 | Stop reason: SDUPTHER

## 2019-05-19 RX ORDER — POTASSIUM CHLORIDE 20 MEQ/1
20 TABLET, EXTENDED RELEASE ORAL DAILY
Start: 2019-05-19 | End: 2019-08-29 | Stop reason: SDUPTHER

## 2019-05-19 RX ADMIN — IPRATROPIUM BROMIDE AND ALBUTEROL SULFATE 3 ML: 2.5; .5 SOLUTION RESPIRATORY (INHALATION) at 08:15

## 2019-05-19 RX ADMIN — ENOXAPARIN SODIUM 40 MG: 40 INJECTION SUBCUTANEOUS at 09:10

## 2019-05-19 RX ADMIN — NYSTATIN 500000 UNITS: 500000 SUSPENSION ORAL at 09:15

## 2019-05-19 RX ADMIN — NYSTATIN 500000 UNITS: 500000 SUSPENSION ORAL at 12:44

## 2019-05-19 RX ADMIN — FLUTICASONE PROPIONATE 2 SPRAY: 50 SPRAY, METERED NASAL at 09:15

## 2019-05-19 RX ADMIN — PROPRANOLOL HYDROCHLORIDE 40 MG: 40 TABLET ORAL at 09:12

## 2019-05-19 RX ADMIN — LISINOPRIL 20 MG: 20 TABLET ORAL at 09:12

## 2019-05-19 RX ADMIN — LEVETIRACETAM 500 MG: 500 TABLET, FILM COATED ORAL at 09:12

## 2019-05-19 RX ADMIN — ATORVASTATIN CALCIUM 20 MG: 20 TABLET, FILM COATED ORAL at 09:12

## 2019-05-19 RX ADMIN — POTASSIUM CHLORIDE 40 MEQ: 750 CAPSULE, EXTENDED RELEASE ORAL at 00:23

## 2019-05-19 RX ADMIN — LEVOTHYROXINE SODIUM 75 MCG: 75 TABLET ORAL at 07:19

## 2019-05-19 RX ADMIN — PAROXETINE HYDROCHLORIDE 10 MG: 10 TABLET, FILM COATED ORAL at 09:10

## 2019-05-19 RX ADMIN — IPRATROPIUM BROMIDE AND ALBUTEROL SULFATE 3 ML: 2.5; .5 SOLUTION RESPIRATORY (INHALATION) at 11:18

## 2019-05-19 RX ADMIN — FUROSEMIDE 40 MG: 40 TABLET ORAL at 09:12

## 2019-05-20 ENCOUNTER — DOCUMENTATION (OUTPATIENT)
Dept: ONCOLOGY | Facility: CLINIC | Age: 79
End: 2019-05-20

## 2019-05-20 NOTE — PROGRESS NOTES
The pt had an initial medical oncology appointment on 5/8/19 with Dr. Elizabeth for recommendations related to her history of treated breast cancer (1998). She completed the Distress Questionnaire and scored 5/10.  The pt had total hip replacement on 5/12/19. OSW attempted to reach the pt today. However, her voicemail box has not been set up and a message could not be left.  OSW will send the pt a letter of introduction explaining oncology social work services.    The pt has a living will scanned into her medical record.    Agustina Kessler, [facility]  Oncology Social Worker

## 2019-05-21 ENCOUNTER — APPOINTMENT (OUTPATIENT)
Dept: OCCUPATIONAL THERAPY | Facility: HOSPITAL | Age: 79
End: 2019-05-21

## 2019-05-23 ENCOUNTER — APPOINTMENT (OUTPATIENT)
Dept: OCCUPATIONAL THERAPY | Facility: HOSPITAL | Age: 79
End: 2019-05-23

## 2019-05-24 ENCOUNTER — APPOINTMENT (OUTPATIENT)
Dept: OCCUPATIONAL THERAPY | Facility: HOSPITAL | Age: 79
End: 2019-05-24

## 2019-05-28 ENCOUNTER — APPOINTMENT (OUTPATIENT)
Dept: OCCUPATIONAL THERAPY | Facility: HOSPITAL | Age: 79
End: 2019-05-28

## 2019-05-30 ENCOUNTER — APPOINTMENT (OUTPATIENT)
Dept: LAB | Facility: HOSPITAL | Age: 79
End: 2019-05-30

## 2019-05-30 ENCOUNTER — APPOINTMENT (OUTPATIENT)
Dept: ONCOLOGY | Facility: CLINIC | Age: 79
End: 2019-05-30

## 2019-06-05 ENCOUNTER — HOSPITAL ENCOUNTER (OUTPATIENT)
Dept: CT IMAGING | Facility: HOSPITAL | Age: 79
Discharge: HOME | End: 2019-06-05
Admitting: INTERNAL MEDICINE

## 2019-06-05 VITALS
BODY MASS INDEX: 24.99 KG/M2 | WEIGHT: 150 LBS | DIASTOLIC BLOOD PRESSURE: 63 MMHG | HEART RATE: 77 BPM | SYSTOLIC BLOOD PRESSURE: 117 MMHG | TEMPERATURE: 97.3 F | HEIGHT: 65 IN | RESPIRATION RATE: 16 BRPM | OXYGEN SATURATION: 97 %

## 2019-06-05 DIAGNOSIS — Z85.3 HISTORY OF RIGHT BREAST CANCER: ICD-10-CM

## 2019-06-05 DIAGNOSIS — R59.1 LYMPHADENOPATHY: ICD-10-CM

## 2019-06-05 DIAGNOSIS — C50.111 MALIGNANT NEOPLASM OF CENTRAL PORTION OF RIGHT FEMALE BREAST, UNSPECIFIED ESTROGEN RECEPTOR STATUS (HCC): ICD-10-CM

## 2019-06-05 DIAGNOSIS — C79.9 METASTATIC CANCER (HCC): ICD-10-CM

## 2019-06-05 DIAGNOSIS — R59.0 LYMPHADENOPATHY, ABDOMINAL: ICD-10-CM

## 2019-06-05 DIAGNOSIS — C25.9 PANCREATIC ADENOCARCINOMA (HCC): ICD-10-CM

## 2019-06-05 LAB
INR PPP: 1.1 (ref 0.8–1.2)
PROTHROMBIN TIME: 12.7 SECONDS (ref 12.8–15.2)

## 2019-06-05 PROCEDURE — 85610 PROTHROMBIN TIME: CPT

## 2019-06-05 PROCEDURE — 77012 CT SCAN FOR NEEDLE BIOPSY: CPT

## 2019-06-05 RX ORDER — SODIUM CHLORIDE 0.9 % (FLUSH) 0.9 %
1-10 SYRINGE (ML) INJECTION AS NEEDED
Status: DISCONTINUED | OUTPATIENT
Start: 2019-06-05 | End: 2019-06-06 | Stop reason: HOSPADM

## 2019-06-05 RX ORDER — SODIUM CHLORIDE 0.9 % (FLUSH) 0.9 %
3 SYRINGE (ML) INJECTION EVERY 12 HOURS SCHEDULED
Status: DISCONTINUED | OUTPATIENT
Start: 2019-06-05 | End: 2019-06-06 | Stop reason: HOSPADM

## 2019-06-05 NOTE — DISCHARGE INSTRUCTIONS
EDUCATION /DISCHARGE INSTRUCTIONS  CT/US guided biopsy:  A biopsy is a procedure done to remove tissue for further analysis.  Before images are taken to locate the target area.  Images can be obtained using ultrasound, CT or MRI.  A physician will clean your skin with antiseptic soap, place a sterile towel around the site and administer a local anesthetic to numb the area.  The physician will then insert a special needle.  Sometimes images are taken of the needle after it is inserted to ensure the needle is in the correct area to be biopsied.   A sample is obtained and sent to the laboratory for study.  Occasionally the laboratory is unable to make a diagnosis from the sample and the procedure may need to be repeated.  Within a week the radiologist will send a report to your physician.  A pathologist will also examine the tissue and send a report.    Risks of the procedure include but are not limited to:   *  Bleeding    *  Infection   *  Puncture of surrounding organs *  Death    Benefits of the procedure:  Using x-ray helps to locate the area that requires a biopsy. The procedure is less invasive than a surgical procedure, there are no large incisions and it does not require anesthesia.    Alternatives to the procedure:  A biopsy can be performed surgically.  Risks of a surgical biopsy include exposure to anesthesia, infection, excessive bleeding and injury to abdominal organs.  A benefit of surgical biopsy is the ability to see the area to be biopsied and remove of a larger piece of tissue.    THIS EDUCATION INFORMATION WAS REVIEWED PRIOR TO PROCEDURE AND CONSENT. Patient initials__________________Time_____0714______________  Post Procedure:    *  Expect the biopsy site may be tender up to one week.    *  Rest today (no pushing pulling or straining).   *  Slowly increase activity tomorrow.    *  If you received sedation do not drive for 24 hours.   *  Keep dressing clean and dry.   *  Leave dressing on puncture  site for 24 hours.    *  You may shower when dressing removed.  Call your doctor if experiencing:   *  Signs of infection such as redness, swelling, excessive pain and / or foul        smelling drainage from the puncture site.   *  Chills or fever over 101 degrees (by mouth).   *  Unrelieved pain.   *  Any new or severe symptoms.   *  If experiencing sudden / severe shortness of breath or chest pain go to the       nearest emergency room.   Following the procedure:     Follow-up with the ordering physician as directed.    Continue to take other medications as directed by your physician unless    otherwise instructed.   If applicable, resume taking your blood thinners or Aspirin on _6/6/19 after 1100__________.  If you have any concerns please call the Radiology Nurses Desk at [phone].  You are the most important factor in your recovery.  Follow the above instructions carefully.

## 2019-06-05 NOTE — NURSING NOTE
Pt returned to triage Dr unable to perform procedure/ No visible window. Physician spoke with  Pt aware and will be d/c'd after IV is removed.

## 2019-06-06 RX ORDER — ALENDRONATE SODIUM 70 MG/1
TABLET ORAL
Qty: 12 TABLET | Refills: 3 | Status: SHIPPED | OUTPATIENT
Start: 2019-06-06

## 2019-06-07 RX ORDER — LEVOTHYROXINE SODIUM 0.07 MG/1
TABLET ORAL
Qty: 30 TABLET | Refills: 2 | Status: SHIPPED | OUTPATIENT
Start: 2019-06-07 | End: 2019-08-01 | Stop reason: SDUPTHER

## 2019-06-18 NOTE — PROGRESS NOTES
Subjective     REASON FOR FOLLOW-UP: 1.  History of breast cancer in 1988 status post surgery by Dr. Joseph Mera and tamoxifen for 5 years    2.  New onset portacaval lymphadenopathy    3.  Meningioma followed by Dr. Oliveira      Provide an opinion on any further workup or treatment                             HISTORY OF PRESENT ILLNESS:  The patient is a 79 y.o. year old female who is here for an opinion about the above issue.    History of Present Illness patient is a 79-year-old female with above mentioned history. Patient underwent hip surgery in May and developed pneumonia as a complication, she is currently still receiving breathing treatments. She is currently using a walker to ambulate. Patient notes fatigue but denies abdominal pain. She states her appetite is healthy and denies weight changes. She denies fevers, chills or night sweats.    We had discussed about obtaining a CT-guided liver biopsy and she states that she went for the liver biopsy but they said they could not do it.  Patient currently is totally asymptomatic from the portacaval lymph node.  She does not have fever or chills.  She states that she would rather be observed and if it worsens at that point to consider biopsy.    ONCOLOGY HISTORY  history of breast cancer in 1998 status post right mastectomy with axillary dissection, underwent tamoxifen for 5 years.  She had 25 lymph nodes removed and were negative.  She has done well up until recently she went to see her primary care physician Dr.Walter Fong.  She had right upper extremity lymphedema for the last 2 months and a Doppler ultrasound of the right upper extremity was negative for any DVT.  Patient then has been sent to lymphedema clinic.  She also has bilateral lower extremity edema for quite some time.  As a result CT abdomen pelvis was done by Dr. Mak Matta and it showed a 3.5 cm portacaval lymph node.  She also had a right renal mass which was 4.3 cm which is thought to be  slightly solid and she was referred to Dr. Roberts who has seen her and felt he will follow with observation with repeat CT scan.  I have reviewed the chest x-ray as well which is negative except cardiomegaly.  Patient was seen in the past by Dr. Graves and echocardiogram in November 2018 had shown valvular heart disease.  Currently her chest x-ray does show cardiomegaly and she does have shortness of breath with walking.  She does walk with a walker.    She denies any weight loss, no chest pain but does have shortness of breath.  She denies any active GI bleeding.  She has had colonoscopy in the past 5 years ago which was showing a polyp and she was repeat colonoscopy in 5 years which she is overdue now.  This was done by Dr. King in the past.  She is also overdue for her left breast mammogram.  She does not have any fever or night sweats.  She has not lost weight.  But she does feel fatigued.      She has intermittent thrombocytopenia.  She has a mild cough but no evidence of active infection right now.    She also has a history of meningioma that is followed by Dr. Oliveira.  She underwent a recent MRI of the brain and I have reviewed that.  MRI of the brain shows a heterogeneous mass overlying the right frontal lobe superolaterally with associated dural tail enhancement.  The mass is 3 cm.  The most posterior nonenhancing or necrotic component has decreased in size slightly from 11.6 mm to 8.6 mm.  Dr. Oliveira wanted to operate on her because she has been complaining of some drowsiness.  Patient does not want to go through surgery on the brain right now.  Patient states that the drowsiness started only when she started Keppra for her seizures 3 months ago.  She likely will require referral to neurology if her drowsiness gets worse.  Clinically she does not look like she has any neurological symptoms.      PA AND LATERAL CHEST 04/10/2019  Moderate cardiomegaly. No evidence of acute disease within  the chest.    CT  "ABDOMEN AND PELVIS 04/22/2019  Pathological portacaval lymphadenopathy.  Given patient's history of  breast cancer, this is most suggestive of metastatic disease.  There is a solid enhancing 4.3 cm right renal neoplasm. 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 Urology consult  is recommended. Suspected large subserosal fibroid.  Colonic diverticulosis.    CT Chest 05/09/2019- Patchy nodular infiltrates throughout the entire left lung  and at the right lung base consistent with pneumonia, likely due to an  atypical infectious agent. Status post right mastectomy. Otherwise  unremarkable CT imaging of the neck and chest.     Bone scan 05/09/2019-No scintigraphic evidence for bony metastatic disease.    CT Needle Biopsy Lymph Node 06/05/2019-No CT-guided core biopsy.able to be performed due to lesion location.    Past Medical History:   Diagnosis Date   • Arthritis    • Benign essential hypertension    • Breast cancer (CMS/HCC) 1998    negative LN per patient- mastectomy and 5 years of tamoxifen   • Broken bones    • Chronic kidney disease     \"mass on Kidney\"  urologist says it was benign   • Coronary artery disease     MV - takes propranolol   • Disease of thyroid gland    • Hyperglycemia    • Lymphadenopathy    • MVP (mitral valve prolapse)    • Osteoporosis    • Pneumonia    • Seizures (CMS/HCC) 2018   • Skin cancer 2007        Past Surgical History:   Procedure Laterality Date   • BILATERAL OOPHORECTOMY  1999   • BREAST SURGERY  1998    Biopsy   • MASTECTOMY Right 07/1998   • SKIN BIOPSY     • SKIN LESION EXCISION  2007   • TOTAL HIP ARTHROPLASTY Left 5/12/2019    Procedure: LEFT ANTERIOR TOTAL HIP ARTHROPLASTY;  Surgeon: Jak Hdz MD;  Location: [facility];  Service: Orthopedics        Current Outpatient Medications on File Prior to Visit   Medication Sig Dispense Refill   • acetaminophen (TYLENOL) 325 " MG tablet Take 2 tablets by mouth Every 6 (Six) Hours As Needed for Mild Pain  (pain or symptomatic fever).     • alendronate (FOSAMAX) 70 MG tablet TAKE 1 TABLET EVERY 7 DAYS 12 tablet 3   • atorvastatin (LIPITOR) 20 MG tablet TAKE 1 TABLET BY MOUTH ONCE DAILY 90 tablet 3   • Calcium Carbonate (CALTRATE 600 PO) Take  by mouth Daily.     • Cholecalciferol (VITAMIN D PO) Take 1,000 Units by mouth Daily.     • fluticasone (FLONASE) 50 MCG/ACT nasal spray 2 sprays by Each Nare route Daily.     • furosemide (LASIX) 40 MG tablet Take 1 tablet by mouth Daily.     • guaiFENesin (MUCINEX) 600 MG 12 hr tablet Take 1 tablet by mouth 2 (Two) Times a Day.     • levETIRAcetam (KEPPRA) 500 MG tablet Take 1 tablet by mouth 2 (Two) Times a Day. 180 tablet 3   • levothyroxine (SYNTHROID, LEVOTHROID) 75 MCG tablet TAKE 1 TABLET BY MOUTH ONCE DAILY 30 tablet 2   • lisinopril (PRINIVIL,ZESTRIL) 20 MG tablet Take 1 tablet by mouth Daily.     • PARoxetine (PAXIL) 10 MG tablet TAKE ONE TABLET BY MOUTH ONCE DAILY IN THE MORNING 90 tablet 3   • potassium chloride (K-DUR,KLOR-CON) 20 MEQ CR tablet Take 1 tablet by mouth Daily.     • propranolol (INDERAL) 40 MG tablet Take 1 tablet by mouth 2 (Two) Times a Day. 180 tablet 3   • ipratropium-albuterol (DUO-NEB) 0.5-2.5 mg/3 ml nebulizer Take 3 mL by nebulization 4 (Four) Times a Day. (Patient taking differently: Take 3 mL by nebulization 4 (Four) Times a Day As Needed.) 360 mL      No current facility-administered medications on file prior to visit.         ALLERGIES:    Allergies   Allergen Reactions   • Shellfish-Derived Products Swelling     shrimp   • Cefaclor Unknown (See Comments)     Pt believes it caused weakness. Pt tolerated ceftriaxone 5/19 admission        Social History     Socioeconomic History   • Marital status: Single     Spouse name: Not on file   • Number of children: Not on file   • Years of education: College   • Highest education level: Not on file   Occupational History  "  • Occupation: Teacher     Employer: RETIRED   Tobacco Use   • Smoking status: Never Smoker   • Smokeless tobacco: Never Used   Substance and Sexual Activity   • Alcohol use: No   • Drug use: No   • Sexual activity: Defer        Family History   Problem Relation Age of Onset   • Cancer Mother    • Cancer Maternal Aunt    • Stroke Father    • Stroke Sister         Review of Systems   Constitutional: Negative for appetite change, chills, diaphoresis, fatigue, fever and unexpected weight change.   HENT: Negative for hearing loss, sore throat and trouble swallowing.    Respiratory: Negative for cough, chest tightness, shortness of breath and wheezing.    Cardiovascular: Negative for chest pain, palpitations and leg swelling.   Gastrointestinal: Negative for abdominal distention, abdominal pain, constipation, diarrhea, nausea and vomiting.   Genitourinary: Negative for dysuria, frequency, hematuria and urgency.   Musculoskeletal: Negative for joint swelling.        No muscle weakness.   Skin: Negative for rash and wound.   Neurological: Negative for seizures, syncope, speech difficulty, weakness, numbness and headaches.   Hematological: Negative for adenopathy. Does not bruise/bleed easily.   Psychiatric/Behavioral: Negative for behavioral problems, confusion and suicidal ideas.     Objective     Vitals:    06/19/19 1020   BP: 112/70   Pulse: 73   Resp: 18   Temp: 97.8 °F (36.6 °C)   TempSrc: Oral   SpO2: 99%   Weight: 67.5 kg (148 lb 12.8 oz)   Height: 165.1 cm (65\")   PainSc: 0-No pain     Current Status 6/19/2019   ECOG score 1       Physical Exam        GENERAL:  Well-developed, well-nourished in no acute distress.   SKIN:  Warm, dry without rashes, purpura or petechiae.  NECK:  Supple with good range of motion; no thyromegaly or masses, no JVD.  LYMPHATICS:  No cervical, supraclavicular, axillary or inguinal adenopathy.  CHEST:  Lungs clear to auscultation. Good airflow.  CARDIAC:  Regular rate and rhythm without " murmurs, rubs or gallops. Normal S1,S2.  ABDOMEN:  Soft, nontender with no hepatosplenomegaly or masses.  EXTREMITIES:  No clubbing, cyanosis or edema.  NEUROLOGICAL:  Cranial Nerves II-XII grossly intact.  No focal neurological deficits.  PSYCHIATRIC:  Normal affect and mood.  ,      RECENT LABS:  Hematology WBC   Date Value Ref Range Status   06/19/2019 5.58 3.40 - 10.80 10*3/mm3 Final   12/27/2018 7.63 4.50 - 10.70 10*3/mm3 Final     RBC   Date Value Ref Range Status   06/19/2019 3.85 3.77 - 5.28 10*6/mm3 Final   12/27/2018 4.33 3.90 - 5.20 10*6/mm3 Final     Hemoglobin   Date Value Ref Range Status   06/19/2019 10.8 (L) 12.0 - 15.9 g/dL Final     Hematocrit   Date Value Ref Range Status   06/19/2019 35.4 34.0 - 46.6 % Final     Platelets   Date Value Ref Range Status   06/19/2019 196 140 - 450 10*3/mm3 Final        Duplex Venous Upper Ext  Study Impression     • Right Internal Jugular: No deep vein thrombosis noted  • Right Subclavian: No deep vein thrombosis noted  • Right Axillary: No deep vein thrombosis noted  • Right Brachial: No deep vein thrombosis noted  • Right Radial: No deep vein thrombosis noted  • Right Ulnar: No deep vein thrombosis noted  • Right Basilic Upper Arm: No superficial thrombophlebitis noted  • Right Basilic Forearm: No superficial thrombophlebitis noted  • Right Cephalic Upper Arm: No superficial thrombophlebitis noted  • Right Cephalic Forearm: No superficial thrombophlebitis noted  • Left Internal Jugular: No deep vein thrombosis noted.   • Left Subclavian: No deep vein thrombosis noted.     Imaging-    CT Abdomen, Pelvis 04/22/2019  FINDINGS: The liver demonstrates normal attenuation. No focal hepatic  lesions or intrahepatic biliary dilatation. There is enlarged lymph node  seen within the portacaval region measuring 3.5 x 3.1 cm. The  gallbladder, spleen and the pancreas is normal. Bilateral adrenal glands  are normal. Both kidneys are normal in size and attenuation. There is  a  partly exophytic heterogeneous solid lesion arising from the midpole  medially within the right kidney measuring 4.3 cm transverse by 4 cm AP  and approximately 4 cm in craniocaudal dimension. The lesion  demonstrates areas of microscopic fat attenuation as well as enhancing  foci. There are bilateral renal cortical cysts present. A 6 mm calculus  is seen within the lower pole of the right kidney. The urinary bladder  is partially distended and normal. The uterus is anteverted. There  appears to be a subserosal fibroid measuring 4.6 x 3.7 cm. No abnormal  adnexal mass is seen. The small and large bowel loops demonstrate normal  caliber. Colonic diverticulosis is present. No pathological  retroperitoneal lymphadenopathy. Incidental note is made of a  retroaortic left renal vein. Degenerative disc disease is seen in the  spine. Vacuum disc phenomena is seen at multiple levels. Changes of  right mastectomy are present.     IMPRESSION:  1. Pathological portacaval lymphadenopathy.  Given patient's history of  breast cancer, this is most suggestive of metastatic disease.  2. There is a solid enhancing 4.3 cm right renal neoplasm. 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 Urology consult  is recommended.  3. Suspected large subserosal fibroid.  4. Colonic diverticulosis.    CT Chest 05/09/2019  FINDINGS: No abnormal masses or fluid collections are identified in the  neck. There is no cervical lymphadenopathy. The parotid glands and  submandibular glands and thyroid gland are unremarkable. The pharynx and  larynx and trachea appear within normal limits. There are moderately  extensive multilevel degenerative disc changes in the cervical spine  that results in mild narrowing of the spinal canal at the C6-C7 level.  There is no encroachment on the spinal canal at any other level.     The patient is status post right mastectomy  without breast  reconstruction. The chest wall is otherwise unremarkable. There is no  mediastinal or hilar or axillary lymphadenopathy. Lung window images  demonstrate small patchy nodular areas of infiltrate throughout the  entire left lung and also in the inferior aspect of the right lower lobe  consistent with pneumonia, likely due to an atypical infectious agent.  No morphologically suspicious lung masses are identified. The heart is  mildly enlarged. There are no pleural effusions.     IMPRESSION:  Patchy nodular infiltrates throughout the entire left lung  and at the right lung base consistent with pneumonia, likely due to an  atypical infectious agent. Status post right mastectomy. Otherwise  unremarkable CT imaging of the neck and chest.     Bone scan 05/09/2019    FINDINGS:  There is a scoliotic curvature of the thoracolumbar spine.  Degenerative uptake is present within the right mid cervical spine  overlying facet joints. This also degenerative uptake within the lumbar  spine. Arthritic uptake overlies both knees. There are no abnormal foci  of uptake to diagnose bony metastatic disease. Both kidneys and the  urinary bladder are visualized.     IMPRESSION:  No scintigraphic evidence for bony metastatic disease.    CT Needle Biopsy Lymph Node 06/05/2019  IMPRESSION:  No CT-guided core biopsy able to be performed due to lesion  location.         Assessment/Plan     1.  History of right breast cancer in 1998 status post right  radical mastectomy, followed by tamoxifen x5 years without evidence of recurrence of 4.  Patient is overdue for left screening mammogram.    2.  Right upper extremity lymphedema since last 2 months is now followed by the lymphedema clinic.    3.  New portacaval lymph node 3.5 cm in size concerning for metastasis.  Given her history of breast cancer in the past certainly we would need to consider if she has evidence of any metastasis versus a new malignancy.  CT scan also showed  evidence of a right kidney mass for which patient was seen by urologist Dr. Roberts who is going to follow that with observation with repeat CT in several months.  Reviewed CT scan and there is no evidence of any other lymphadenopathy and liver appears normal.  We will need to consider CT-guided biopsy of the portacaval lymph node  We will also obtain cancer markers today.  · CA 15-3 is 15.8 normal,  · CA 19-9 is 11.0, CEA is 1.43  · Patient scheduled for CT-guided biopsy but not done secondary to location    4.  New onset right renal mass followed by Dr. Roberts, we will get his records.  He is following with observation and repeat CT.    5.  Bilateral lower extremity edema with cardiomegaly, reviewed his previous echocardiogram and it shows valvular heart disease and we will refer her to Dr. Graves.    6.  Meningioma followed by Dr. Oliveira who was considering surgery but patient wants to wait.  I have reviewed her recent MRI of the brain done March 2019.    7.  Seizure, new onset for which she has been on Keppra since last 3 months.  She has drowsiness since last 3 months she has been on Keppra.    8.  Thrombocytopenia, she has had intermittent thrombocytopenia but today it is 196,000.    Plan     1. Reviewed CT scans and Bone Scan  2. CT-guided biopsy of the portacaval lymph node was not obtained due to the location.  3. Refer to Dr. King to consider colonoscopy/EGD given new onset portacaval lymph node.  Patient states her last colonoscopy was negative.  4. Follow-up with me in 3 months with labs and CT scan 1 week prior    I, Petty Roman, acted as scribe for Kristi Elizabeth MD  in documenting the service or procedure. To the best of my knowledge, I recorded what was dictated by MD Kristi Paniagua MD    CC- Dr. Mak Lemos

## 2019-06-19 ENCOUNTER — LAB (OUTPATIENT)
Dept: OTHER | Facility: HOSPITAL | Age: 79
End: 2019-06-19

## 2019-06-19 ENCOUNTER — OFFICE VISIT (OUTPATIENT)
Dept: ONCOLOGY | Facility: CLINIC | Age: 79
End: 2019-06-19

## 2019-06-19 VITALS
HEIGHT: 65 IN | TEMPERATURE: 97.8 F | SYSTOLIC BLOOD PRESSURE: 112 MMHG | BODY MASS INDEX: 24.79 KG/M2 | OXYGEN SATURATION: 99 % | RESPIRATION RATE: 18 BRPM | HEART RATE: 73 BPM | DIASTOLIC BLOOD PRESSURE: 70 MMHG | WEIGHT: 148.8 LBS

## 2019-06-19 DIAGNOSIS — F41.9 ANXIETY: ICD-10-CM

## 2019-06-19 DIAGNOSIS — C79.9 METASTATIC CANCER (HCC): Primary | ICD-10-CM

## 2019-06-19 DIAGNOSIS — C79.9 METASTATIC CANCER (HCC): ICD-10-CM

## 2019-06-19 LAB
ALBUMIN SERPL-MCNC: 3.7 G/DL (ref 3.5–5.2)
ALBUMIN/GLOB SERPL: 1.2 G/DL
ALP SERPL-CCNC: 93 U/L (ref 39–117)
ALT SERPL W P-5'-P-CCNC: 12 U/L (ref 1–33)
ANION GAP SERPL CALCULATED.3IONS-SCNC: 8.2 MMOL/L
AST SERPL-CCNC: 16 U/L (ref 1–32)
BASOPHILS # BLD AUTO: 0.03 10*3/MM3 (ref 0–0.2)
BASOPHILS NFR BLD AUTO: 0.5 % (ref 0–1.5)
BILIRUB SERPL-MCNC: 0.5 MG/DL (ref 0.1–1.2)
BUN BLD-MCNC: 27 MG/DL (ref 8–23)
BUN/CREAT SERPL: 45.8 (ref 7–25)
CALCIUM SPEC-SCNC: 10.6 MG/DL (ref 8.6–10.5)
CHLORIDE SERPL-SCNC: 108 MMOL/L (ref 98–107)
CO2 SERPL-SCNC: 26.8 MMOL/L (ref 22–29)
CREAT BLD-MCNC: 0.59 MG/DL (ref 0.57–1)
DEPRECATED RDW RBC AUTO: 53.1 FL (ref 37–54)
EOSINOPHIL # BLD AUTO: 0.1 10*3/MM3 (ref 0–0.4)
EOSINOPHIL NFR BLD AUTO: 1.8 % (ref 0.3–6.2)
ERYTHROCYTE [DISTWIDTH] IN BLOOD BY AUTOMATED COUNT: 15.6 % (ref 12.3–15.4)
GFR SERPL CREATININE-BSD FRML MDRD: 98 ML/MIN/1.73
GLOBULIN UR ELPH-MCNC: 3 GM/DL
GLUCOSE BLD-MCNC: 100 MG/DL (ref 65–99)
HCT VFR BLD AUTO: 35.4 % (ref 34–46.6)
HGB BLD-MCNC: 10.8 G/DL (ref 12–15.9)
IMM GRANULOCYTES # BLD AUTO: 0.05 10*3/MM3 (ref 0–0.05)
IMM GRANULOCYTES NFR BLD AUTO: 0.9 % (ref 0–0.5)
LYMPHOCYTES # BLD AUTO: 0.6 10*3/MM3 (ref 0.7–3.1)
LYMPHOCYTES NFR BLD AUTO: 10.8 % (ref 19.6–45.3)
MCH RBC QN AUTO: 28.1 PG (ref 26.6–33)
MCHC RBC AUTO-ENTMCNC: 30.5 G/DL (ref 31.5–35.7)
MCV RBC AUTO: 91.9 FL (ref 79–97)
MONOCYTES # BLD AUTO: 0.44 10*3/MM3 (ref 0.1–0.9)
MONOCYTES NFR BLD AUTO: 7.9 % (ref 5–12)
NEUTROPHILS # BLD AUTO: 4.36 10*3/MM3 (ref 1.7–7)
NEUTROPHILS NFR BLD AUTO: 78.1 % (ref 42.7–76)
NRBC BLD AUTO-RTO: 0 /100 WBC (ref 0–0.2)
PLATELET # BLD AUTO: 196 10*3/MM3 (ref 140–450)
PMV BLD AUTO: 12.7 FL (ref 6–12)
POTASSIUM BLD-SCNC: 4.1 MMOL/L (ref 3.5–5.2)
PROT SERPL-MCNC: 6.7 G/DL (ref 6–8.5)
RBC # BLD AUTO: 3.85 10*6/MM3 (ref 3.77–5.28)
SODIUM BLD-SCNC: 143 MMOL/L (ref 136–145)
WBC NRBC COR # BLD: 5.58 10*3/MM3 (ref 3.4–10.8)

## 2019-06-19 PROCEDURE — 99214 OFFICE O/P EST MOD 30 MIN: CPT | Performed by: INTERNAL MEDICINE

## 2019-06-19 PROCEDURE — 36415 COLL VENOUS BLD VENIPUNCTURE: CPT

## 2019-06-19 PROCEDURE — 80053 COMPREHEN METABOLIC PANEL: CPT | Performed by: INTERNAL MEDICINE

## 2019-06-19 PROCEDURE — 85025 COMPLETE CBC W/AUTO DIFF WBC: CPT | Performed by: INTERNAL MEDICINE

## 2019-06-24 ENCOUNTER — DOCUMENTATION (OUTPATIENT)
Dept: ONCOLOGY | Facility: CLINIC | Age: 79
End: 2019-06-24

## 2019-06-24 DIAGNOSIS — I10 BENIGN ESSENTIAL HYPERTENSION: ICD-10-CM

## 2019-06-24 DIAGNOSIS — E03.9 ACQUIRED HYPOTHYROIDISM: ICD-10-CM

## 2019-06-24 DIAGNOSIS — R73.9 HYPERGLYCEMIA: ICD-10-CM

## 2019-06-24 DIAGNOSIS — E78.5 HYPERLIPIDEMIA, UNSPECIFIED HYPERLIPIDEMIA TYPE: ICD-10-CM

## 2019-06-24 DIAGNOSIS — I10 BENIGN ESSENTIAL HYPERTENSION: Primary | ICD-10-CM

## 2019-06-24 NOTE — PROGRESS NOTES
OSW met with the pt and her supportive cousin, Lou, on 6/19/19 at [place] prior to her follow-up appointment with Dr. Elizabeth. The pt had come for treatment recommendations for a portaclaval lymph node finding which is suspicious for metastatic disease. The pt was treated in 1988 for breast cancer and took tamoxifen for five years. The pt is also being followed by neurology (Stanley Oliveira MD) for a meningioma. The pt's next follow-up with Dr. Elizabeth is 9/10/19 at the [facility].    The pt presented as alert and open. She is age 79 and resides in [place]; she has been living independently in her home since 1954. The pt is currently using a walker to ambulate; she states she is fatigued and is struggling somewhat with her ADL's. Since the pt does have a long-term care policy, OSW encouraged the pt to call her agent to inquire about obtaining assistance for in-home care. The pt had surgery on 5/12/19 for a left anterior total hip arthroplasty and spent two weeks in rehab at [facility] [place], [place] after ten days in [facility].    We discussed the pt's lymphedema concerns related to her right arm. The pt stated she is unable to wrap the arm herself. The pt agreed to address this concern with Dr. Elizabeth to further address her need for PT intervention.     The pt is not currently driving. OSW offered LYFT rides to medical appointments on the [facility]. The pt verbalized that she prefers to be independent and not ask for assistance for family members. OSW provided her contact information and will reach out to the pt this week to follow-up and further assess her needs.    Agustina Kessler, [facility]  Oncology Social Worker

## 2019-06-26 ENCOUNTER — TELEPHONE (OUTPATIENT)
Dept: FAMILY MEDICINE CLINIC | Facility: CLINIC | Age: 79
End: 2019-06-26

## 2019-06-26 LAB
ALBUMIN SERPL-MCNC: 3.6 G/DL (ref 3.5–5.2)
ALBUMIN/GLOB SERPL: 1.4 G/DL
ALP SERPL-CCNC: 82 U/L (ref 39–117)
ALT SERPL-CCNC: 17 U/L (ref 1–33)
AST SERPL-CCNC: 18 U/L (ref 1–32)
BASOPHILS # BLD AUTO: 0.01 10*3/MM3 (ref 0–0.2)
BASOPHILS NFR BLD AUTO: 0.2 % (ref 0–1.5)
BILIRUB SERPL-MCNC: 0.4 MG/DL (ref 0.2–1.2)
BUN SERPL-MCNC: 21 MG/DL (ref 8–23)
BUN/CREAT SERPL: 30.9 (ref 7–25)
CALCIUM SERPL-MCNC: 10 MG/DL (ref 8.6–10.5)
CHLORIDE SERPL-SCNC: 108 MMOL/L (ref 98–107)
CHOLEST SERPL-MCNC: 122 MG/DL (ref 0–200)
CO2 SERPL-SCNC: 26.8 MMOL/L (ref 22–29)
CREAT SERPL-MCNC: 0.68 MG/DL (ref 0.57–1)
EOSINOPHIL # BLD AUTO: 0.14 10*3/MM3 (ref 0–0.4)
EOSINOPHIL NFR BLD AUTO: 2.4 % (ref 0.3–6.2)
ERYTHROCYTE [DISTWIDTH] IN BLOOD BY AUTOMATED COUNT: 16.2 % (ref 12.3–15.4)
GLOBULIN SER CALC-MCNC: 2.6 GM/DL
GLUCOSE SERPL-MCNC: 95 MG/DL (ref 65–99)
HBA1C MFR BLD: 5.4 % (ref 4.8–5.6)
HCT VFR BLD AUTO: 37 % (ref 34–46.6)
HDLC SERPL-MCNC: 39 MG/DL (ref 40–60)
HGB BLD-MCNC: 11 G/DL (ref 12–15.9)
IMM GRANULOCYTES # BLD AUTO: 0.03 10*3/MM3 (ref 0–0.05)
IMM GRANULOCYTES NFR BLD AUTO: 0.5 % (ref 0–0.5)
LDLC SERPL CALC-MCNC: 61 MG/DL (ref 0–100)
LDLC/HDLC SERPL: 1.57 {RATIO}
LYMPHOCYTES # BLD AUTO: 0.81 10*3/MM3 (ref 0.7–3.1)
LYMPHOCYTES NFR BLD AUTO: 14 % (ref 19.6–45.3)
MCH RBC QN AUTO: 28 PG (ref 26.6–33)
MCHC RBC AUTO-ENTMCNC: 29.7 G/DL (ref 31.5–35.7)
MCV RBC AUTO: 94.1 FL (ref 79–97)
MONOCYTES # BLD AUTO: 0.56 10*3/MM3 (ref 0.1–0.9)
MONOCYTES NFR BLD AUTO: 9.7 % (ref 5–12)
NEUTROPHILS # BLD AUTO: 4.25 10*3/MM3 (ref 1.7–7)
NEUTROPHILS NFR BLD AUTO: 73.7 % (ref 42.7–76)
PLATELET # BLD AUTO: 210 10*3/MM3 (ref 140–450)
POTASSIUM SERPL-SCNC: 4 MMOL/L (ref 3.5–5.2)
PROT SERPL-MCNC: 6.2 G/DL (ref 6–8.5)
RBC # BLD AUTO: 3.93 10*6/MM3 (ref 3.77–5.28)
SODIUM SERPL-SCNC: 144 MMOL/L (ref 136–145)
T4 FREE SERPL-MCNC: 1.87 NG/DL (ref 0.93–1.7)
TRIGL SERPL-MCNC: 109 MG/DL (ref 0–150)
TSH SERPL DL<=0.005 MIU/L-ACNC: 2.95 MIU/ML (ref 0.27–4.2)
VLDLC SERPL CALC-MCNC: 21.8 MG/DL
WBC # BLD AUTO: 5.77 10*3/MM3 (ref 3.4–10.8)

## 2019-06-26 NOTE — TELEPHONE ENCOUNTER
-----message left on voicemail this is ok to do        Message from Belinda Mead sent at 6/26/2019  3:05 PM EDT -----  Jaciel with [facility]     WANTING TO GET AN OK FOR A  TO GO OUT AND SEE HER     [phone]    PLEASE CALL JACIEL

## 2019-06-27 ENCOUNTER — OFFICE VISIT (OUTPATIENT)
Dept: FAMILY MEDICINE CLINIC | Facility: CLINIC | Age: 79
End: 2019-06-27

## 2019-06-27 VITALS
SYSTOLIC BLOOD PRESSURE: 120 MMHG | DIASTOLIC BLOOD PRESSURE: 70 MMHG | OXYGEN SATURATION: 98 % | HEART RATE: 71 BPM | HEIGHT: 65 IN | BODY MASS INDEX: 24.99 KG/M2 | RESPIRATION RATE: 16 BRPM | WEIGHT: 150 LBS | TEMPERATURE: 97 F

## 2019-06-27 DIAGNOSIS — I89.0 LYMPHEDEMA OF ARM: ICD-10-CM

## 2019-06-27 DIAGNOSIS — D32.0 MENINGIOMA, CEREBRAL (HCC): ICD-10-CM

## 2019-06-27 DIAGNOSIS — R59.0 LYMPHADENOPATHY, ABDOMINAL: ICD-10-CM

## 2019-06-27 DIAGNOSIS — N28.89 RENAL MASS, RIGHT: ICD-10-CM

## 2019-06-27 DIAGNOSIS — E03.9 ACQUIRED HYPOTHYROIDISM: ICD-10-CM

## 2019-06-27 DIAGNOSIS — R73.9 HYPERGLYCEMIA: ICD-10-CM

## 2019-06-27 DIAGNOSIS — Z23 NEED FOR VACCINATION: ICD-10-CM

## 2019-06-27 DIAGNOSIS — I10 BENIGN ESSENTIAL HYPERTENSION: Primary | ICD-10-CM

## 2019-06-27 DIAGNOSIS — S72.002A LEFT DISPLACED FEMORAL NECK FRACTURE (HCC): ICD-10-CM

## 2019-06-27 DIAGNOSIS — J18.9 PNEUMONIA DUE TO INFECTIOUS ORGANISM, UNSPECIFIED LATERALITY, UNSPECIFIED PART OF LUNG: ICD-10-CM

## 2019-06-27 DIAGNOSIS — E78.5 HYPERLIPIDEMIA, UNSPECIFIED HYPERLIPIDEMIA TYPE: ICD-10-CM

## 2019-06-27 PROCEDURE — 99214 OFFICE O/P EST MOD 30 MIN: CPT | Performed by: INTERNAL MEDICINE

## 2019-06-27 PROCEDURE — 90670 PCV13 VACCINE IM: CPT | Performed by: INTERNAL MEDICINE

## 2019-06-27 PROCEDURE — G0009 ADMIN PNEUMOCOCCAL VACCINE: HCPCS | Performed by: INTERNAL MEDICINE

## 2019-06-27 NOTE — PROGRESS NOTES
"Subjective   Karolina Ornelas is a 79 y.o. female. Patient is here today for follow-up on her hypertension, hyperlipidemia, history of pneumonia, hypothyroidism, history of meningioma, history of hip fracture, lymphedema in the right arm and a right renal mass and some abdominal limb adenopathy of uncertain significance.  Patient also has some hyperglycemia in the past.  She is recovering from her hip surgery and is ambulating using a walker.  Is not having any particular breathing problems currently.  She has upcoming appointments with oncology, urology, neurology.  Chief Complaint   Patient presents with   • Follow-up     hip left          Vitals:    06/27/19 1348   BP: 120/70   Pulse: 71   Resp: 16   Temp: 97 °F (36.1 °C)   SpO2: 98%     The following portions of the patient's history were reviewed and updated as appropriate: allergies, current medications, past family history, past medical history, past social history, past surgical history and problem list.    Past Medical History:   Diagnosis Date   • Arthritis    • Benign essential hypertension    • Breast cancer (CMS/HCC) 1998    negative LN per patient- mastectomy and 5 years of tamoxifen   • Broken bones    • Chronic kidney disease     \"mass on Kidney\"  urologist says it was benign   • Coronary artery disease     MV - takes propranolol   • Disease of thyroid gland    • Hyperglycemia    • Lymphadenopathy    • MVP (mitral valve prolapse)    • Osteoporosis    • Pneumonia    • Seizures (CMS/HCC) 2018   • Skin cancer 2007      Allergies   Allergen Reactions   • Shellfish-Derived Products Swelling     shrimp   • Cefaclor Unknown (See Comments)     Pt believes it caused weakness. Pt tolerated ceftriaxone 5/19 admission      Social History     Socioeconomic History   • Marital status: Single     Spouse name: Not on file   • Number of children: Not on file   • Years of education: College   • Highest education level: Not on file   Occupational History   • Occupation: " Teacher     Employer: RETIRED   Tobacco Use   • Smoking status: Never Smoker   • Smokeless tobacco: Never Used   Substance and Sexual Activity   • Alcohol use: No   • Drug use: No   • Sexual activity: Defer        Current Outpatient Medications:   •  acetaminophen (TYLENOL) 325 MG tablet, Take 2 tablets by mouth Every 6 (Six) Hours As Needed for Mild Pain  (pain or symptomatic fever)., Disp: , Rfl:   •  alendronate (FOSAMAX) 70 MG tablet, TAKE 1 TABLET EVERY 7 DAYS, Disp: 12 tablet, Rfl: 3  •  atorvastatin (LIPITOR) 20 MG tablet, TAKE 1 TABLET BY MOUTH ONCE DAILY, Disp: 90 tablet, Rfl: 3  •  Calcium Carbonate (CALTRATE 600 PO), Take  by mouth Daily., Disp: , Rfl:   •  Cholecalciferol (VITAMIN D PO), Take 1,000 Units by mouth Daily., Disp: , Rfl:   •  fluticasone (FLONASE) 50 MCG/ACT nasal spray, 2 sprays by Each Nare route Daily., Disp: , Rfl:   •  furosemide (LASIX) 40 MG tablet, Take 1 tablet by mouth Daily., Disp: , Rfl:   •  guaiFENesin (MUCINEX) 600 MG 12 hr tablet, Take 1 tablet by mouth 2 (Two) Times a Day., Disp: , Rfl:   •  levETIRAcetam (KEPPRA) 500 MG tablet, Take 1 tablet by mouth 2 (Two) Times a Day., Disp: 180 tablet, Rfl: 3  •  levothyroxine (SYNTHROID, LEVOTHROID) 75 MCG tablet, TAKE 1 TABLET BY MOUTH ONCE DAILY, Disp: 30 tablet, Rfl: 2  •  lisinopril (PRINIVIL,ZESTRIL) 20 MG tablet, Take 1 tablet by mouth Daily., Disp: , Rfl:   •  PARoxetine (PAXIL) 10 MG tablet, TAKE ONE TABLET BY MOUTH ONCE DAILY IN THE MORNING, Disp: 90 tablet, Rfl: 3  •  potassium chloride (K-DUR,KLOR-CON) 20 MEQ CR tablet, Take 1 tablet by mouth Daily., Disp: , Rfl:   •  propranolol (INDERAL) 40 MG tablet, Take 1 tablet by mouth 2 (Two) Times a Day., Disp: 180 tablet, Rfl: 3     Objective     History of Present Illness     Review of Systems   Constitutional: Negative.    HENT: Negative.    Eyes: Negative.    Respiratory: Negative.    Cardiovascular: Negative.    Gastrointestinal: Negative.    Genitourinary: Negative.     Musculoskeletal: Negative.    Skin: Negative.    Neurological: Negative.    Psychiatric/Behavioral: Negative.        Physical Exam   Constitutional: She is oriented to person, place, and time. She appears well-developed and well-nourished.   Pleasant, cooperative no acute distress, ambulating using a walker blood pressure 120/80   HENT:   Head: Normocephalic and atraumatic.   Eyes: Conjunctivae are normal. Pupils are equal, round, and reactive to light. No scleral icterus.   Neck: Normal range of motion. Neck supple.   Cardiovascular: Normal rate, regular rhythm and normal heart sounds.   Pulmonary/Chest: Effort normal and breath sounds normal. No respiratory distress. She has no wheezes. She has no rales.   Musculoskeletal: Normal range of motion.   Marked size discrepancy with the right arm much larger than the left consistent with lymphedema.  Bilateral leg edema about 2+   Neurological: She is alert and oriented to person, place, and time.   Skin: Skin is warm and dry.   Psychiatric: She has a normal mood and affect. Her behavior is normal.   Nursing note and vitals reviewed.      ASSESSMENT CBC is essentially normal.  Lipid panel is generally well controlled with total cholesterol 122 and LDL 61.  CMP is essentially normal.  Hemoglobin A1c was normal.  TSH was normal and free T4 is just minimally high with a value of 1.87, clinically euthyroid  #1-hypertension controlled  #2-hyperlipidemia controlled  #3-history of recent pneumonia, needs follow-up chest x-ray  #4-hypothyroidism, euthyroid on replacement  #5-recent hip fracture, ambulating with a walker  #6-hyperglycemia in the past with normal sugar and hemoglobin A1c today  #7-renal mass and abdominal lymphadenopathy, etiology uncertain and work-up in progress     Problem List Items Addressed This Visit        Cardiovascular and Mediastinum    Benign essential hypertension - Primary    Hyperlipidemia       Respiratory    Pneumonia    Relevant Orders    XR  Chest PA & Lateral       Endocrine    Acquired hypothyroidism       Nervous and Auditory    Meningioma, cerebral (CMS/HCC)       Musculoskeletal and Integument    Left displaced femoral neck fracture (CMS/HCC)       Immune and Lymphatic    Lymphadenopathy, abdominal       Genitourinary    Renal mass, right       Other    Hyperglycemia    Lymphedema of arm    Relevant Orders    XR Chest PA & Lateral          PLAN I have ordered a repeat chest x-ray to follow-up on the patient's recent pneumonia.  I am going to give her a Prevnar 13 vaccination today.  She will continue her current medicines as now and I would like to recheck her in 4 months with a CBC, CMP, lipid panel, TSH and free T4    There are no Patient Instructions on file for this visit.  No Follow-up on file.

## 2019-07-01 ENCOUNTER — TELEPHONE (OUTPATIENT)
Dept: NEUROSURGERY | Facility: CLINIC | Age: 79
End: 2019-07-01

## 2019-07-01 RX ORDER — PROPRANOLOL HYDROCHLORIDE 40 MG/1
TABLET ORAL
Qty: 180 TABLET | Refills: 3 | Status: SHIPPED | OUTPATIENT
Start: 2019-07-01

## 2019-07-05 ENCOUNTER — HOSPITAL ENCOUNTER (OUTPATIENT)
Dept: GENERAL RADIOLOGY | Facility: HOSPITAL | Age: 79
Discharge: HOME | End: 2019-07-05
Admitting: INTERNAL MEDICINE

## 2019-07-05 ENCOUNTER — HOSPITAL ENCOUNTER (OUTPATIENT)
Dept: OCCUPATIONAL THERAPY | Facility: HOSPITAL | Age: 79
Setting detail: THERAPIES SERIES
Discharge: HOME | End: 2019-07-05

## 2019-07-05 DIAGNOSIS — I97.2 POST-MASTECTOMY LYMPHEDEMA SYNDROME: Primary | ICD-10-CM

## 2019-07-05 DIAGNOSIS — I89.0 LYMPHEDEMA OF ARM: ICD-10-CM

## 2019-07-05 DIAGNOSIS — J18.9 PNEUMONIA DUE TO INFECTIOUS ORGANISM, UNSPECIFIED LATERALITY, UNSPECIFIED PART OF LUNG: ICD-10-CM

## 2019-07-05 PROCEDURE — 97535 SELF CARE MNGMENT TRAINING: CPT

## 2019-07-05 PROCEDURE — 97140 MANUAL THERAPY 1/> REGIONS: CPT

## 2019-07-05 PROCEDURE — 71046 X-RAY EXAM CHEST 2 VIEWS: CPT

## 2019-07-05 NOTE — THERAPY PROGRESS REPORT/RE-CERT
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   • Hyperglycemia   • Lymphedema of arm   • History of right breast cancer   • Renal mass, right   • Lymphadenopathy, abdominal   • Left displaced femoral neck fracture (CMS/HCC)   • Pneumonia   • Hypoxia   • Acute bronchiolitis due to human metapneumovirus        Past Medical History:   Diagnosis Date   • Arthritis    • Benign essential hypertension    • Breast cancer (CMS/HCC)     negative LN per patient- mastectomy and 5 years of tamoxifen   • Broken bones    • Chronic kidney disease     \"mass on Kidney\"  urologist says it was benign   • Coronary artery disease     MV - takes propranolol   • Disease of thyroid gland    • Hyperglycemia    • Lymphadenopathy    • MVP (mitral valve prolapse)    • Osteoporosis    • Pneumonia    • Seizures (CMS/HCC)    • Skin cancer         Past Surgical History:   Procedure Laterality Date   • BILATERAL OOPHORECTOMY     • BREAST SURGERY      Biopsy   • MASTECTOMY Right 1998   • SKIN BIOPSY     • SKIN LESION EXCISION     • TOTAL HIP ARTHROPLASTY Left 2019    Procedure: LEFT ANTERIOR TOTAL HIP ARTHROPLASTY;  Surgeon: Jak Hdz MD;  Location: [facility];  Service: Orthopedics         Visit Dx:      ICD-10-CM ICD-9-CM   1. 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  yes  -RE      Pre-Treatment " Pain Level  0  -RE      Post-Treatment Pain Level  0  -RE         Subjective Comments    Subjective Comments  c/o fullness under the arm  -RE         BUE Circumferential (cm)    Measurement Location 1  Axilla  -RE      Right 1  26 cm  -RE      Measurement Location 2  15 cm above elbow  -RE      Right 2  27 cm  -RE      Measurement Location 3  10 cm above elbow  -RE      Right 3  28 cm  -RE      Measurement Location 4  5 cm above elbow  -RE      Right 4  28.5 cm  -RE      Measurement Location 5  Elbow  -RE      Right 5  28 cm  -RE      Measurement Location 6  5 cm below elbow  -RE      Right 6  29.5 cm  -RE      Measurement Location 7  10 cm below elbow  -RE      Right 7  27.5 cm  -RE      Measurement Location 8  15 cm below elbow  -RE      Right 8  23.3 cm  -RE      Measurement Location 9  20 cm below elbow  -RE      Right 9  20 cm  -RE      Measurement Location 10  Wrist  -RE      Right 10  17.5 cm  -RE      Measurement Location 11  Mid palm  -RE      Right 11  16.5 cm  -RE      RUE Circumferential Total  271.8 cm  -RE         Manual Lymphatic Drainage    Manual Lymphatic Drainage  initial sequence;opened regional lymph nodes;opened anastamoses;extremity treatment  -RE      Initial Sequence  short neck;cervical;supraclavicular  -RE      Cervical  right;left  -RE      Supraclavicular  right;left  -RE      Opened Regional Lymph Nodes  axillary;inguinal  -RE      Axillary  left  -RE      Inguinal  right  -RE      Opened Anastamoses  anterior axillo-axillary;axillo-inguinal  -RE      Axillo-Inguinal  right  -RE      Extremity Treatment  MLD to full limb  -RE         Compression/Skin Care    Compression/Skin Care  skin care;wrapping location;bandaging  -RE      Skin Care  moisturizing lotion applied  -RE      Wrapping Location  upper extremity  -RE      Wrapping Location UE  right:;fingers to axilla  -RE      Bandage Layers  cotton liner;padding/fluff layer;short-stretch bandages (comment size/quantity)  -RE       Bandaging Comments  tG 7, 1 1/2 Artiflex, 1-6cm,1-8cm and 1-10cm Rosidal k and 1 Mollelast to fingers  -RE      Bandaging Technique  circumferential/spiral;light compression  -RE        User Key  (r) = Recorded By, (t) = Taken By, (c) = Cosigned By    Initials Name Provider Type    Poornima Torres OTR Occupational Therapist                  OT Assessment/Plan     Row Name 07/05/19 1626          OT Assessment    Impairments  Edema;Impaired lymphatic circulation  -RE     Assessment Comments  Patient returns to therapy after a hospital stay and IP rehab for hip fx and pneumonia.  Circumferential measurements are actually decreased by about 8 cm  which is likely due to decreased activity.  No new functional deficits noted.  Will resume treatment.   -RE     OT Diagnosis  Post mastectomy lymphedema  -RE     OT Rehab Potential  Good  -RE     Patient/caregiver participated in establishment of treatment plan and goals  Yes  -RE     Patient would benefit from skilled therapy intervention  Yes  -RE        OT Plan    OT Frequency  3x/week  -RE     Predicted Duration of Therapy Intervention (Therapy Eval)  4 weeks  -RE     Planned CPT's?  OT THER ACT EA 15 MIN: 94689GH;OT THER PROC EA 15 MIN: 57125AA;OT SELF CARE/MGMT/TRAIN 15 MIN: 00193;OT MANUAL THERAPY EA 15 MIN: 60138 skin care and compression bandaging  -RE     Planned Therapy Interventions (Optional Details)  home exercise program;manual therapy techniques;patient/family education;other (see comments) Skin care and compression bandaging  -RE     OT Plan Comments  Resume lymphedema treatment  -RE       User Key  (r) = Recorded By, (t) = Taken By, (c) = Cosigned By    Initials Name Provider Type    Poornima Torres OTR Occupational Therapist                 Manual Rx (last 36 hours)      Manual Treatments     Row Name 07/05/19 1629             Total Minutes    48291 - OT Manual Therapy Minutes  60  -RE        User Key  (r) = Recorded By, (t) = Taken By, (c) = Cosigned By     Initials Name Provider Type    Poornima Torres OTR Occupational Therapist        OT Goals     Row Name 07/05/19 1300          OT Short Term Goals    STG Date to Achieve  07/19/19  -RE     STG 1  Patient will demonstrate proper awareness of “What is Lymphedema?” and “Healthy Habits” for improved prevention, management, care of symptoms and ease of transition to self-care of condition.   -RE     STG 1 Progress  Ongoing  -RE     STG 2  Patient independent and compliant with self-wrapping techniques of compression bandages with family member as indicated for improved self-management of condition.  -RE     STG 2 Progress  Ongoing  -RE     STG 3  Patient will demonstrate decreased net edema of right upper extremity >/= 10-20 cm for decrease in symptoms, decreased risk of infection and improved skin care/transition to self-care of condition.   -RE     STG 3 Progress  Ongoing  -RE        Long Term Goals    LTG Date to Achieve  07/26/19  -RE     LTG 1  Patient will demonstrate decreased net edema of right upper extremity >/= 21-30 cm for decrease in symptoms, decreased risk of infection and improved skin care/transition to self-care of condition.   -RE     LTG 1 Progress  Ongoing  -RE     LTG 2  Patient independent and compliant with initial home exercise program focused on diaphragmatic breathing, range of motion, flexibility to decrease edema and improve lymphatic flow for decreased edema and decreased risk of infection.   -RE     LTG 2 Progress  Ongoing  -RE     LTG 3  Patient independent and compliant with compression garments and night compression as indicated for self-management of edema.   -RE     LTG 3 Progress  Ongoing  -RE        Time Calculation    OT Goal Re-Cert Due Date  07/26/19  -RE       User Key  (r) = Recorded By, (t) = Taken By, (c) = Cosigned By    Initials Name Provider Type    Poornima Torres OTR Occupational Therapist          Therapy Education  Education Details: Instructed patient and her  friend in bandaging and in self bandaging.    How Provided: Verbal, Demonstration  Provided to: Patient, Caregiver  Level of Understanding: Teach back education performed, Verbalized  23516 - OT Self Care/Mgmt Minutes: 30                Time Calculation:   OT Start Time: 1300  OT Stop Time: 1430  OT Time Calculation (min): 90 min  Total Timed Code Minutes- OT: 90 minute(s)     Therapy Charges for Today     Code Description Service Date Service Provider Modifiers Qty    10869912582 HC OT MANUAL THERAPY EA 15 MIN 7/5/2019 Poornima Middleton OTHAYDE GO 4    [id number]  OT SELF CARE/MGMT/TRAIN EA 15 MIN 7/5/2019 Poornima Middleton OTR GO 2                      SURY Mae  7/5/2019

## 2019-07-08 RX ORDER — FUROSEMIDE 40 MG/1
TABLET ORAL
Qty: 90 TABLET | Refills: 0 | Status: SHIPPED | OUTPATIENT
Start: 2019-07-08

## 2019-07-08 RX ORDER — POTASSIUM CHLORIDE 1500 MG/1
TABLET, FILM COATED, EXTENDED RELEASE ORAL
Qty: 90 TABLET | Refills: 0 | Status: SHIPPED | OUTPATIENT
Start: 2019-07-08 | End: 2019-07-25 | Stop reason: SDUPTHER

## 2019-07-08 RX ORDER — LISINOPRIL 20 MG/1
TABLET ORAL
Qty: 90 TABLET | Refills: 0 | Status: SHIPPED | OUTPATIENT
Start: 2019-07-08 | End: 2019-07-22 | Stop reason: SDUPTHER

## 2019-07-09 ENCOUNTER — HOSPITAL ENCOUNTER (OUTPATIENT)
Dept: OCCUPATIONAL THERAPY | Facility: HOSPITAL | Age: 79
Setting detail: THERAPIES SERIES
Discharge: HOME | End: 2019-07-09

## 2019-07-09 DIAGNOSIS — I97.2 POST-MASTECTOMY LYMPHEDEMA SYNDROME: Primary | ICD-10-CM

## 2019-07-09 PROCEDURE — 97535 SELF CARE MNGMENT TRAINING: CPT

## 2019-07-09 PROCEDURE — 97140 MANUAL THERAPY 1/> REGIONS: CPT

## 2019-07-09 NOTE — THERAPY TREATMENT NOTE
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   • Hyperglycemia   • Lymphedema of arm   • History of right breast cancer   • Renal mass, right   • Lymphadenopathy, abdominal   • Left displaced femoral neck fracture (CMS/HCC)   • Pneumonia   • Hypoxia   • Acute bronchiolitis due to human metapneumovirus        Past Medical History:   Diagnosis Date   • Arthritis    • Benign essential hypertension    • Breast cancer (CMS/HCC)     negative LN per patient- mastectomy and 5 years of tamoxifen   • Broken bones    • Chronic kidney disease     \"mass on Kidney\"  urologist says it was benign   • Coronary artery disease     MV - takes propranolol   • Disease of thyroid gland    • Hyperglycemia    • Lymphadenopathy    • MVP (mitral valve prolapse)    • Osteoporosis    • Pneumonia    • Seizures (CMS/HCC)    • Skin cancer         Past Surgical History:   Procedure Laterality Date   • BILATERAL OOPHORECTOMY     • BREAST SURGERY      Biopsy   • MASTECTOMY Right 1998   • SKIN BIOPSY     • SKIN LESION EXCISION     • TOTAL HIP ARTHROPLASTY Left 2019    Procedure: LEFT ANTERIOR TOTAL HIP ARTHROPLASTY;  Surgeon: Jak Hdz MD;  Location: [facility];  Service: Orthopedics         Visit Dx:      ICD-10-CM ICD-9-CM   1. 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  yes  -RE      " Pre-Treatment Pain Level  0  -RE      Post-Treatment Pain Level  0  -RE         Subjective Comments    Subjective Comments  had difficulty with bandaging  -RE         Manual Lymphatic Drainage    Manual Lymphatic Drainage  initial sequence;opened regional lymph nodes;opened anastamoses;extremity treatment  -RE      Initial Sequence  short neck;cervical;supraclavicular  -RE      Cervical  right;left  -RE      Supraclavicular  right;left  -RE      Opened Regional Lymph Nodes  axillary;inguinal  -RE      Axillary  left  -RE      Inguinal  right  -RE      Opened Anastamoses  anterior axillo-axillary;axillo-inguinal;posterior axillo-axillary  -RE      Axillo-Inguinal  right  -RE      Extremity Treatment  MLD to full limb  -RE         Compression/Skin Care    Compression/Skin Care  skin care;wrapping location;bandaging  -RE      Skin Care  moisturizing lotion applied  -RE      Wrapping Location  upper extremity  -RE      Wrapping Location UE  right:;hand to axilla  -RE      Bandage Layers  cotton liner;padding/fluff layer;short-stretch bandages (comment size/quantity)  -RE      Bandaging Comments  tG 7, 1 1/2 Artiflex, 1-6cm, 2-10cm Rosidal k   -RE      Bandaging Technique  circumferential/spiral;light compression  -RE        User Key  (r) = Recorded By, (t) = Taken By, (c) = Cosigned By    Initials Name Provider Type    Poornima Torres OTR Occupational Therapist                  OT Assessment/Plan     Row Name 07/09/19 9865          OT Assessment    Assessment Comments  Upper arm edema is decreased.  Patient did have difficulty with bandaging technique.  encouraged her to ask an friend or family member for help.  Edmea is forearm decreased with MLD.  -RE        OT Plan    OT Plan Comments  continue  -RE       User Key  (r) = Recorded By, (t) = Taken By, (c) = Cosigned By    Initials Name Provider Type    Poornima Torres OTR Occupational Therapist                 Manual Rx (last 36 hours)      Manual Treatments      Row Name 07/09/19 1758             Total Minutes    69305 - OT Manual Therapy Minutes  60  -RE        User Key  (r) = Recorded By, (t) = Taken By, (c) = Cosigned By    Initials Name Provider Type    RE Poornima Middleton OTR Occupational Therapist            Therapy Education  Education Details: Reviewed bandaging at length  Program: Reinforced  How Provided: Verbal, Demonstration, Written  Provided to: Patient  Level of Understanding: Teach back education performed, Verbalized  03481 - OT Self Care/Mgmt Minutes: 15                Time Calculation:   OT Start Time: 1530  OT Stop Time: 1645  OT Time Calculation (min): 75 min  Total Timed Code Minutes- OT: 75 minute(s)     Therapy Charges for Today     Code Description Service Date Service Provider Modifiers Qty    [id number]  OT MANUAL THERAPY EA 15 MIN 7/9/2019 Poornima Middleton OTR GO 4    [id number]  OT SELF CARE/MGMT/TRAIN EA 15 MIN 7/9/2019 Poorinma Middleton OTR GO 1                      SURY Mae  7/9/2019

## 2019-07-11 ENCOUNTER — HOSPITAL ENCOUNTER (OUTPATIENT)
Dept: OCCUPATIONAL THERAPY | Facility: HOSPITAL | Age: 79
Setting detail: THERAPIES SERIES
Discharge: HOME | End: 2019-07-11

## 2019-07-11 ENCOUNTER — TELEPHONE (OUTPATIENT)
Dept: FAMILY MEDICINE CLINIC | Facility: CLINIC | Age: 79
End: 2019-07-11

## 2019-07-11 DIAGNOSIS — I97.2 POST-MASTECTOMY LYMPHEDEMA SYNDROME: Primary | ICD-10-CM

## 2019-07-11 PROCEDURE — 97535 SELF CARE MNGMENT TRAINING: CPT

## 2019-07-11 PROCEDURE — 97140 MANUAL THERAPY 1/> REGIONS: CPT

## 2019-07-11 NOTE — THERAPY TREATMENT NOTE
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   • Hyperglycemia   • Lymphedema of arm   • History of right breast cancer   • Renal mass, right   • Lymphadenopathy, abdominal   • Left displaced femoral neck fracture (CMS/HCC)   • Pneumonia   • Hypoxia   • Acute bronchiolitis due to human metapneumovirus        Past Medical History:   Diagnosis Date   • Arthritis    • Benign essential hypertension    • Breast cancer (CMS/HCC)     negative LN per patient- mastectomy and 5 years of tamoxifen   • Broken bones    • Chronic kidney disease     \"mass on Kidney\"  urologist says it was benign   • Coronary artery disease     MV - takes propranolol   • Disease of thyroid gland    • Hyperglycemia    • Lymphadenopathy    • MVP (mitral valve prolapse)    • Osteoporosis    • Pneumonia    • Seizures (CMS/HCC)    • Skin cancer         Past Surgical History:   Procedure Laterality Date   • BILATERAL OOPHORECTOMY     • BREAST SURGERY      Biopsy   • MASTECTOMY Right 1998   • SKIN BIOPSY     • SKIN LESION EXCISION     • TOTAL HIP ARTHROPLASTY Left 2019    Procedure: LEFT ANTERIOR TOTAL HIP ARTHROPLASTY;  Surgeon: Jak Hdz MD;  Location: [facility];  Service: Orthopedics         Visit Dx:      ICD-10-CM ICD-9-CM   1. 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  yes  -RE      " Pre-Treatment Pain Level  0  -RE      Post-Treatment Pain Level  0  -RE         Subjective Comments    Subjective Comments  No c/o finger swelling  -RE         BUE Circumferential (cm)    Measurement Location 1  Axilla  -RE      Right 1  26.5 cm  -RE      Measurement Location 2  15 cm above elbow  -RE      Right 2  27 cm  -RE      Measurement Location 3  10 cm above elbow  -RE      Right 3  27.8 cm  -RE      Measurement Location 4  5 cm above elbow  -RE      Right 4  26.5 cm  -RE      Measurement Location 5  Elbow  -RE      Right 5  27.3 cm  -RE      Measurement Location 6  5 cm below elbow  -RE      Right 6  29 cm  -RE      Measurement Location 7  10 cm below elbow  -RE      Right 7  27.5 cm  -RE      Measurement Location 8  15 cm below elbow  -RE      Right 8  21 cm  -RE      Measurement Location 9  20 cm below elbow  -RE      Right 9  17.5 cm  -RE      Measurement Location 10  Wrist  -RE      Right 10  15.8 cm  -RE      Measurement Location 11  Mid palm  -RE      Right 11  16.5 cm  -RE      RUE Circumferential Total  262.4 cm  -RE         Manual Lymphatic Drainage    Manual Lymphatic Drainage  initial sequence;opened regional lymph nodes;opened anastamoses;extremity treatment  -RE      Initial Sequence  short neck;cervical;supraclavicular  -RE      Cervical  right;left  -RE      Supraclavicular  right;left  -RE      Opened Regional Lymph Nodes  axillary;inguinal  -RE      Axillary  left  -RE      Inguinal  right  -RE      Opened Anastamoses  anterior axillo-axillary;axillo-inguinal;posterior axillo-axillary  -RE      Axillo-Inguinal  right  -RE      Extremity Treatment  MLD to full limb  -RE         Compression/Skin Care    Compression/Skin Care  skin care;wrapping location;bandaging  -RE      Skin Care  moisturizing lotion applied  -RE      Wrapping Location  upper extremity  -RE      Wrapping Location UE  right:;hand to axilla  -RE      Bandage Layers  cotton liner;soft foam- 1/4 inch;short-stretch bandages  (comment size/quantity)  -RE      Bandaging Comments  tG 7, 1 1/1-Rosidal soft, 1-6cm,1-8cm,  1-10cm Rosidal k   -RE      Bandaging Technique  circumferential/spiral;light compression;moderate compression  -RE        User Key  (r) = Recorded By, (t) = Taken By, (c) = Cosigned By    Initials Name Provider Type    Poornima Torres OTR Occupational Therapist                  OT Assessment/Plan     Row Name 07/11/19 1654          OT Assessment    Assessment Comments  measurements indicate a 9.4 cm decrease since 7/5 and a 17.1 cm decrease since IE. Good progress this week.   -RE        OT Plan    OT Plan Comments  continue  -RE       User Key  (r) = Recorded By, (t) = Taken By, (c) = Cosigned By    Initials Name Provider Type    Poornima Torres OTR Occupational Therapist                 Manual Rx (last 36 hours)      Manual Treatments     Row Name 07/11/19 1656             Total Minutes    35664 - OT Manual Therapy Minutes  60  -RE        User Key  (r) = Recorded By, (t) = Taken By, (c) = Cosigned By    Initials Name Provider Type    Poornima Torres OTR Occupational Therapist            Therapy Education  Education Details: Instructedd pt's friend in bandaging  Program: Reinforced  How Provided: Verbal, Demonstration, Written  Provided to: Patient, Caregiver  Level of Understanding: Verbalized, Teach back education performed  49063 - OT Self Care/Mgmt Minutes: 15                Time Calculation:   OT Start Time: 1530  OT Stop Time: 1645  OT Time Calculation (min): 75 min  Total Timed Code Minutes- OT: 75 minute(s)     Therapy Charges for Today     Code Description Service Date Service Provider Modifiers Qty    36560970673 HC OT MANUAL THERAPY EA 15 MIN 7/11/2019 Poornima Middleton OTR GO 4    63179846059 HC OT SELF CARE/MGMT/TRAIN EA 15 MIN 7/11/2019 Poornima Middleton OTR GO 1                      SURY Mae  7/11/2019

## 2019-07-11 NOTE — TELEPHONE ENCOUNTER
SPOKE TO PATIENT CXR WNL       Message from Mak Lemos MD sent at 7/10/2019  4:31 PM EDT -----  Her chest x-ray was fine and showed no signs of pneumonia         ----- Message -----  From: Joseline Barreto MA  Sent: 7/10/2019   3:49 PM  To: Mak Lemos MD     PLEASE REVIEW AND ADVISE  ----- Message -----  From: Stacey Roque RegSched Rep  Sent: 7/10/2019   3:13 PM  To: Joseline Barreto MA    PT CALLED AND WANTED TO KNOW HER XRAY RESULTS FROM THE XRAY SHE HAD ON 7/5/19.    PLEAS CALL PT BACK ABOUT THIS -[phone]    THANK YOU

## 2019-07-12 ENCOUNTER — HOSPITAL ENCOUNTER (OUTPATIENT)
Dept: OCCUPATIONAL THERAPY | Facility: HOSPITAL | Age: 79
Setting detail: THERAPIES SERIES
Discharge: HOME | End: 2019-07-12

## 2019-07-12 DIAGNOSIS — I97.2 POST-MASTECTOMY LYMPHEDEMA SYNDROME: Primary | ICD-10-CM

## 2019-07-12 PROCEDURE — 97535 SELF CARE MNGMENT TRAINING: CPT

## 2019-07-12 PROCEDURE — 97140 MANUAL THERAPY 1/> REGIONS: CPT

## 2019-07-12 NOTE — THERAPY TREATMENT NOTE
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   • Hyperglycemia   • Lymphedema of arm   • History of right breast cancer   • Renal mass, right   • Lymphadenopathy, abdominal   • Left displaced femoral neck fracture (CMS/HCC)   • Pneumonia   • Hypoxia   • Acute bronchiolitis due to human metapneumovirus        Past Medical History:   Diagnosis Date   • Arthritis    • Benign essential hypertension    • Breast cancer (CMS/HCC)     negative LN per patient- mastectomy and 5 years of tamoxifen   • Broken bones    • Chronic kidney disease     \"mass on Kidney\"  urologist says it was benign   • Coronary artery disease     MV - takes propranolol   • Disease of thyroid gland    • Hyperglycemia    • Lymphadenopathy    • MVP (mitral valve prolapse)    • Osteoporosis    • Pneumonia    • Seizures (CMS/HCC)    • Skin cancer         Past Surgical History:   Procedure Laterality Date   • BILATERAL OOPHORECTOMY     • BREAST SURGERY      Biopsy   • MASTECTOMY Right 1998   • SKIN BIOPSY     • SKIN LESION EXCISION     • TOTAL HIP ARTHROPLASTY Left 2019    Procedure: LEFT ANTERIOR TOTAL HIP ARTHROPLASTY;  Surgeon: Jak Hdz MD;  Location: [facility];  Service: Orthopedics         Visit Dx:      ICD-10-CM ICD-9-CM   1. 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  yes  -RE  yes  " -RE     Pre-Treatment Pain Level  0  -RE  0  -RE     Post-Treatment Pain Level  0  -RE  0  -RE        Subjective Comments    Subjective Comments  c/o mild irritation   -RE  No c/o finger swelling  -RE        Skin Changes/Observations    Skin Observations Comment  mild redness/irritation around elbow  -RE  --        BUE Circumferential (cm)    Measurement Location 1  --  Axilla  -RE     Right 1  --  26.5 cm  -RE     Measurement Location 2  --  15 cm above elbow  -RE     Right 2  --  27 cm  -RE     Measurement Location 3  --  10 cm above elbow  -RE     Right 3  --  27.8 cm  -RE     Measurement Location 4  --  5 cm above elbow  -RE     Right 4  --  26.5 cm  -RE     Measurement Location 5  --  Elbow  -RE     Right 5  --  27.3 cm  -RE     Measurement Location 6  --  5 cm below elbow  -RE     Right 6  --  29 cm  -RE     Measurement Location 7  --  10 cm below elbow  -RE     Right 7  --  27.5 cm  -RE     Measurement Location 8  --  15 cm below elbow  -RE     Right 8  --  21 cm  -RE     Measurement Location 9  --  20 cm below elbow  -RE     Right 9  --  17.5 cm  -RE     Measurement Location 10  --  Wrist  -RE     Right 10  --  15.8 cm  -RE     Measurement Location 11  --  Mid palm  -RE     Right 11  --  16.5 cm  -RE     RUE Circumferential Total  --  262.4 cm  -RE        Manual Lymphatic Drainage    Manual Lymphatic Drainage  initial sequence;opened regional lymph nodes;opened anastamoses;extremity treatment  -RE  initial sequence;opened regional lymph nodes;opened anastamoses;extremity treatment  -RE     Initial Sequence  short neck;cervical;supraclavicular  -RE  short neck;cervical;supraclavicular  -RE     Cervical  right;left  -RE  right;left  -RE     Supraclavicular  right;left  -RE  right;left  -RE     Opened Regional Lymph Nodes  axillary;inguinal  -RE  axillary;inguinal  -RE     Axillary  left  -RE  left  -RE     Inguinal  right  -RE  right  -RE     Opened Anastamoses  anterior  axillo-axillary;axillo-inguinal;posterior axillo-axillary  -RE  anterior axillo-axillary;axillo-inguinal;posterior axillo-axillary  -RE     Axillo-Inguinal  right  -RE  right  -RE     Extremity Treatment  MLD to full limb  -RE  MLD to full limb  -RE        Compression/Skin Care    Compression/Skin Care  skin care;wrapping location;bandaging;remove bandages  -RE  skin care;wrapping location;bandaging  -RE     Skin Care  washed/dried;lotion applied;topical anti-inflammatory applied;other (comment) zinc oxide  -RE  moisturizing lotion applied  -RE     Wrapping Location  upper extremity  -RE  upper extremity  -RE     Wrapping Location UE  right:;hand to axilla  -RE  right:;hand to axilla  -RE     Bandage Layers  cotton liner;soft foam- 1/4 inch;short-stretch bandages (comment size/quantity)  -RE  cotton liner;soft foam- 1/4 inch;short-stretch bandages (comment size/quantity)  -RE     Bandaging Comments  tG 7, 1-Rosidal soft, 1-6cm,1-8cm,  1-10cm Rosidal k   -RE  tG 7, 1 1/1-Rosidal soft, 1-6cm,1-8cm,  1-10cm Rosidal k   -RE     Bandaging Technique  circumferential/spiral;light compression;moderate compression  -RE  circumferential/spiral;light compression;moderate compression  -RE       User Key  (r) = Recorded By, (t) = Taken By, (c) = Cosigned By    Initials Name Provider Type    RE Poornima Middleton OTR Occupational Therapist                  OT Assessment/Plan     Row Name 07/12/19 1237 07/11/19 5681       OT Assessment    Assessment Comments  Edema reduced today. foam addressed forearm more effectively.  Continues with edema along ulna but overall edema is softer.  Good progress this week.   -RE  measurements indicate a 9.4 cm decrease since 7/5 and a 17.1 cm decrease since IE. Good progress this week.   -RE       OT Plan    OT Plan Comments  continue  -RE  continue  -RE      User Key  (r) = Recorded By, (t) = Taken By, (c) = Cosigned By    Initials Name Provider Type    RE Poornima Middleton, MARCUSR Occupational Therapist                  Manual Rx (last 36 hours)      Manual Treatments     Row Name 07/12/19 1239 07/11/19 1656          Total Minutes    33687 - OT Manual Therapy Minutes  60  -RE  60  -RE       User Key  (r) = Recorded By, (t) = Taken By, (c) = Cosigned By    Initials Name Provider Type    RE Poornima Middleton OTR Occupational Therapist            Therapy Education  Education Details: reviewed bandagind and answered questions. pt took notes  Program: Reinforced  How Provided: Verbal, Demonstration  Provided to: Patient  Level of Understanding: Teach back education performed, Verbalized  67587 - OT Self Care/Mgmt Minutes: 15                Time Calculation:   OT Start Time: 1100  OT Stop Time: 1215  OT Time Calculation (min): 75 min  Total Timed Code Minutes- OT: 75 minute(s)     Therapy Charges for Today     Code Description Service Date Service Provider Modifiers Qty    67606597428 HC OT MANUAL THERAPY EA 15 MIN 7/12/2019 Poornima Middleton OTR GO 4    15561150862 HC OT SELF CARE/MGMT/TRAIN EA 15 MIN 7/12/2019 Poornima Middleton OTR GO 1                      SURY Mae  7/12/2019

## 2019-07-16 ENCOUNTER — HOSPITAL ENCOUNTER (OUTPATIENT)
Dept: OCCUPATIONAL THERAPY | Facility: HOSPITAL | Age: 79
Setting detail: THERAPIES SERIES
Discharge: HOME | End: 2019-07-16

## 2019-07-16 DIAGNOSIS — I97.2 POST-MASTECTOMY LYMPHEDEMA SYNDROME: Primary | ICD-10-CM

## 2019-07-16 PROCEDURE — 97140 MANUAL THERAPY 1/> REGIONS: CPT

## 2019-07-16 PROCEDURE — 97535 SELF CARE MNGMENT TRAINING: CPT

## 2019-07-16 NOTE — THERAPY TREATMENT NOTE
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   • Hyperglycemia   • Lymphedema of arm   • History of right breast cancer   • Renal mass, right   • Lymphadenopathy, abdominal   • Left displaced femoral neck fracture (CMS/HCC)   • Pneumonia   • Hypoxia   • Acute bronchiolitis due to human metapneumovirus        Past Medical History:   Diagnosis Date   • Arthritis    • Benign essential hypertension    • Breast cancer (CMS/HCC)     negative LN per patient- mastectomy and 5 years of tamoxifen   • Broken bones    • Chronic kidney disease     \"mass on Kidney\"  urologist says it was benign   • Coronary artery disease     MV - takes propranolol   • Disease of thyroid gland    • Hyperglycemia    • Lymphadenopathy    • MVP (mitral valve prolapse)    • Osteoporosis    • Pneumonia    • Seizures (CMS/HCC)    • Skin cancer         Past Surgical History:   Procedure Laterality Date   • BILATERAL OOPHORECTOMY     • BREAST SURGERY      Biopsy   • MASTECTOMY Right 1998   • SKIN BIOPSY     • SKIN LESION EXCISION     • TOTAL HIP ARTHROPLASTY Left 2019    Procedure: LEFT ANTERIOR TOTAL HIP ARTHROPLASTY;  Surgeon: Jak Hdz MD;  Location: [facility];  Service: Orthopedics         Visit Dx:      ICD-10-CM ICD-9-CM   1. 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  yes  -RE      " Pre-Treatment Pain Level  0  -RE      Post-Treatment Pain Level  0  -RE         Subjective Comments    Subjective Comments  still having trouble with bandaging  -RE         Manual Lymphatic Drainage    Manual Lymphatic Drainage  initial sequence;opened regional lymph nodes;opened anastamoses;extremity treatment  -RE      Initial Sequence  short neck;cervical;supraclavicular  -RE      Cervical  right;left  -RE      Supraclavicular  right;left  -RE      Opened Regional Lymph Nodes  axillary;inguinal  -RE      Axillary  left  -RE      Inguinal  right  -RE      Opened Anastamoses  anterior axillo-axillary;axillo-inguinal  -RE      Axillo-Inguinal  right  -RE      Extremity Treatment  MLD to full limb  -RE         Compression/Skin Care    Compression/Skin Care  skin care;wrapping location;bandaging;remove bandages  -RE      Skin Care  washed/dried;lotion applied;topical anti-inflammatory applied;other (comment);topical anti-microbial applied zinc oxide  -RE      Wrapping Location  upper extremity  -RE      Wrapping Location UE  right:;hand to axilla  -RE      Bandage Layers  cotton liner;soft foam- 1/4 inch;short-stretch bandages (comment size/quantity)  -RE      Bandaging Comments  tG 7, 1-Rosidal soft, 1-6cm,1-8cm,  2-10cm Rosidal k   -RE      Bandaging Technique  circumferential/spiral;light compression;moderate compression  -RE        User Key  (r) = Recorded By, (t) = Taken By, (c) = Cosigned By    Initials Name Provider Type    Poornima Torres OTR Occupational Therapist                  OT Assessment/Plan     Row Name 07/16/19 0930          OT Assessment    Assessment Comments  Continues to have some problems with bandaging. A friend is assisting but they continue to need more instruction. Made phone video for her today.   -RE        OT Plan    OT Plan Comments  continue  -RE       User Key  (r) = Recorded By, (t) = Taken By, (c) = Cosigned By    Initials Name Provider Type    Poornima Torres OTR Occupational  Therapist                 Manual Rx (last 36 hours)      Manual Treatments     Row Name 07/16/19 1631             Total Minutes    26317 - OT Manual Therapy Minutes  40  -RE        User Key  (r) = Recorded By, (t) = Taken By, (c) = Cosigned By    Initials Name Provider Type    RE Poornima Middleton OTR Occupational Therapist            Therapy Education  Education Details: Reviewed bandaging with additional writen instructions and a phone video. answered questions .   Program: Progressed, Modified  How Provided: Verbal, Demonstration, Written  Provided to: Patient  Level of Understanding: Teach back education performed, Verbalized  45249 - OT Self Care/Mgmt Minutes: 45                Time Calculation:   OT Start Time: 1300  OT Stop Time: 1425  OT Time Calculation (min): 85 min  Total Timed Code Minutes- OT: 85 minute(s)     Therapy Charges for Today     Code Description Service Date Service Provider Modifiers Qty    45251650144  OT MANUAL THERAPY EA 15 MIN 7/16/2019 Poornima Middleton OTR GO 3    [id number]  OT SELF CARE/MGMT/TRAIN EA 15 MIN 7/16/2019 Poornima Middleton OTR GO 3                      SURY Mae  7/16/2019

## 2019-07-17 ENCOUNTER — HOSPITAL ENCOUNTER (OUTPATIENT)
Dept: MRI IMAGING | Facility: HOSPITAL | Age: 79
Discharge: HOME | End: 2019-07-17
Admitting: NEUROLOGICAL SURGERY

## 2019-07-17 DIAGNOSIS — D32.9 MENINGIOMA (HCC): ICD-10-CM

## 2019-07-17 LAB — CREAT BLDA-MCNC: 0.6 MG/DL (ref 0.6–1.3)

## 2019-07-17 PROCEDURE — 70553 MRI BRAIN STEM W/O & W/DYE: CPT

## 2019-07-17 PROCEDURE — 82565 ASSAY OF CREATININE: CPT

## 2019-07-17 PROCEDURE — 0 GADOBENATE DIMEGLUMINE 529 MG/ML SOLUTION: Performed by: NEUROLOGICAL SURGERY

## 2019-07-17 PROCEDURE — A9577 INJ MULTIHANCE: HCPCS | Performed by: NEUROLOGICAL SURGERY

## 2019-07-17 RX ADMIN — GADOBENATE DIMEGLUMINE 14 ML: 529 INJECTION, SOLUTION INTRAVENOUS at 14:30

## 2019-07-18 ENCOUNTER — HOSPITAL ENCOUNTER (OUTPATIENT)
Dept: OCCUPATIONAL THERAPY | Facility: HOSPITAL | Age: 79
Setting detail: THERAPIES SERIES
Discharge: HOME | End: 2019-07-18

## 2019-07-18 DIAGNOSIS — I97.2 POST-MASTECTOMY LYMPHEDEMA SYNDROME: Primary | ICD-10-CM

## 2019-07-18 PROCEDURE — 97140 MANUAL THERAPY 1/> REGIONS: CPT

## 2019-07-18 PROCEDURE — 97535 SELF CARE MNGMENT TRAINING: CPT

## 2019-07-18 NOTE — THERAPY TREATMENT NOTE
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   • Hyperglycemia   • Lymphedema of arm   • History of right breast cancer   • Renal mass, right   • Lymphadenopathy, abdominal   • Left displaced femoral neck fracture (CMS/HCC)   • Pneumonia   • Hypoxia   • Acute bronchiolitis due to human metapneumovirus        Past Medical History:   Diagnosis Date   • Arthritis    • Benign essential hypertension    • Breast cancer (CMS/HCC)     negative LN per patient- mastectomy and 5 years of tamoxifen   • Broken bones    • Chronic kidney disease     \"mass on Kidney\"  urologist says it was benign   • Coronary artery disease     MV - takes propranolol   • Disease of thyroid gland    • Hyperglycemia    • Lymphadenopathy    • MVP (mitral valve prolapse)    • Osteoporosis    • Pneumonia    • Seizures (CMS/HCC)    • Skin cancer         Past Surgical History:   Procedure Laterality Date   • BILATERAL OOPHORECTOMY     • BREAST SURGERY      Biopsy   • MASTECTOMY Right 1998   • SKIN BIOPSY     • SKIN LESION EXCISION     • TOTAL HIP ARTHROPLASTY Left 2019    Procedure: LEFT ANTERIOR TOTAL HIP ARTHROPLASTY;  Surgeon: Jak Hdz MD;  Location: [facility];  Service: Orthopedics         Visit Dx:      ICD-10-CM ICD-9-CM   1. 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  yes  -RE      " Pre-Treatment Pain Level  0  -RE      Post-Treatment Pain Level  0  -RE         Subjective Comments    Subjective Comments  c/o irritation at the elbow  -RE         Skin Changes/Observations    Skin Observations Comment  mild redness at elbow  -RE         Lymphedema Measurements    Measurement Type(s)  Circumferential  -RE      Circumferential Areas  Upper extremities  -RE         BUE Circumferential (cm)    Measurement Location 1  Axilla  -RE      Right 1  28.5 cm  -RE      Measurement Location 2  15 cm above elbow  -RE      Right 2  27 cm  -RE      Measurement Location 3  10 cm above elbow  -RE      Right 3  26.5 cm  -RE      Measurement Location 4  5 cm above elbow  -RE      Right 4  26.5 cm  -RE      Measurement Location 5  Elbow  -RE      Right 5  25 cm  -RE      Measurement Location 6  5 cm below elbow  -RE      Right 6  25 cm  -RE      Measurement Location 7  10 cm below elbow  -RE      Right 7  24.5 cm  -RE      Measurement Location 8  15 cm below elbow  -RE      Right 8  21 cm  -RE      Measurement Location 9  20 cm below elbow  -RE      Right 9  17 cm  -RE      Measurement Location 10  Wrist  -RE      Right 10  15 cm  -RE      Measurement Location 11  Mid palm  -RE      Right 11  16 cm  -RE      RUE Circumferential Total  252 cm  -RE         Manual Lymphatic Drainage    Manual Lymphatic Drainage  initial sequence;opened regional lymph nodes;opened anastamoses;extremity treatment  -RE      Initial Sequence  short neck;cervical;supraclavicular  -RE      Cervical  right;left  -RE      Supraclavicular  right;left  -RE      Opened Regional Lymph Nodes  axillary;inguinal  -RE      Axillary  left  -RE      Inguinal  right  -RE      Opened Anastamoses  anterior axillo-axillary;axillo-inguinal  -RE      Axillo-Inguinal  right  -RE      Extremity Treatment  MLD to full limb  -RE         Compression/Skin Care    Compression/Skin Care  skin care;wrapping location;bandaging;remove bandages  -RE      Skin Care   washed/dried;moisturizing lotion applied  -RE      Wrapping Location  upper extremity  -RE      Wrapping Location UE  right:;hand to axilla  -RE      Bandage Layers  cotton liner;soft foam- 1/4 inch;short-stretch bandages (comment size/quantity)  -RE      Bandaging Comments  tG 7, 1-Rosidal soft, 1-6cm,1-8cm,  2-10cm Rosidal k  Added Artiflex to cubital area to prevent irritation  -RE      Bandaging Technique  circumferential/spiral;light compression;moderate compression  -RE        User Key  (r) = Recorded By, (t) = Taken By, (c) = Cosigned By    Initials Name Provider Type    Poornima Torres OTR Occupational Therapist                  OT Assessment/Plan     Row Name 07/18/19 1729          OT Assessment    Assessment Comments  Measurements have decreased 27.5 cm since IE and 10.4 cm since last week.  Excellent progress.  Continues with edema around the elbow and along the ulna.  No finger swelling noted.     -RE        OT Plan    OT Plan Comments  continue  -RE       User Key  (r) = Recorded By, (t) = Taken By, (c) = Cosigned By    Initials Name Provider Type    Poornima Torres OTR Occupational Therapist                 Manual Rx (last 36 hours)      Manual Treatments     Row Name 07/18/19 1732             Total Minutes    51121 - OT Manual Therapy Minutes  60  -RE        User Key  (r) = Recorded By, (t) = Taken By, (c) = Cosigned By    Initials Name Provider Type    Poornima Torres OTR Occupational Therapist            Therapy Education  Education Details: Reviewed bandaging with pt's friend  Program: Reinforced  How Provided: Verbal, Demonstration  Provided to: Patient, Caregiver  Level of Understanding: Teach back education performed, Verbalized  41621 - OT Self Care/Mgmt Minutes: 15                Time Calculation:   OT Start Time: 1300  OT Stop Time: 1415  OT Time Calculation (min): 75 min  Total Timed Code Minutes- OT: 75 minute(s)     Therapy Charges for Today     Code Description Service Date Service  Provider Modifiers Qty    59348526228 HC OT MANUAL THERAPY EA 15 MIN 7/18/2019 Poornima Middleton OTR GO 4    50475323215 HC OT SELF CARE/MGMT/TRAIN EA 15 MIN 7/18/2019 Poornima Middleton OTR GO 1                      SURY Mae  7/18/2019

## 2019-07-19 ENCOUNTER — HOSPITAL ENCOUNTER (OUTPATIENT)
Dept: OCCUPATIONAL THERAPY | Facility: HOSPITAL | Age: 79
Setting detail: THERAPIES SERIES
Discharge: HOME | End: 2019-07-19

## 2019-07-19 DIAGNOSIS — I97.2 POST-MASTECTOMY LYMPHEDEMA SYNDROME: Primary | ICD-10-CM

## 2019-07-19 PROCEDURE — 97535 SELF CARE MNGMENT TRAINING: CPT

## 2019-07-19 PROCEDURE — 97140 MANUAL THERAPY 1/> REGIONS: CPT

## 2019-07-19 NOTE — THERAPY TREATMENT NOTE
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   • Hyperglycemia   • Lymphedema of arm   • History of right breast cancer   • Renal mass, right   • Lymphadenopathy, abdominal   • Left displaced femoral neck fracture (CMS/HCC)   • Pneumonia   • Hypoxia   • Acute bronchiolitis due to human metapneumovirus        Past Medical History:   Diagnosis Date   • Arthritis    • Benign essential hypertension    • Breast cancer (CMS/HCC)     negative LN per patient- mastectomy and 5 years of tamoxifen   • Broken bones    • Chronic kidney disease     \"mass on Kidney\"  urologist says it was benign   • Coronary artery disease     MV - takes propranolol   • Disease of thyroid gland    • Hyperglycemia    • Lymphadenopathy    • MVP (mitral valve prolapse)    • Osteoporosis    • Pneumonia    • Seizures (CMS/HCC)    • Skin cancer         Past Surgical History:   Procedure Laterality Date   • BILATERAL OOPHORECTOMY     • BREAST SURGERY      Biopsy   • MASTECTOMY Right 1998   • SKIN BIOPSY     • SKIN LESION EXCISION     • TOTAL HIP ARTHROPLASTY Left 2019    Procedure: LEFT ANTERIOR TOTAL HIP ARTHROPLASTY;  Surgeon: Jak Hdz MD;  Location: [facility];  Service: Orthopedics         Visit Dx:      ICD-10-CM ICD-9-CM   1. 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  yes  -RE      " Pre-Treatment Pain Level  0  -RE      Post-Treatment Pain Level  0  -RE         Subjective Comments    Subjective Comments  still having discomfort at elbow.  had to take 4 th bandage off.  -RE         Manual Lymphatic Drainage    Manual Lymphatic Drainage  initial sequence;opened regional lymph nodes;opened anastamoses;extremity treatment  -RE      Initial Sequence  short neck;cervical;supraclavicular  -RE      Cervical  right;left  -RE      Supraclavicular  right;left  -RE      Opened Regional Lymph Nodes  axillary;inguinal  -RE      Axillary  left  -RE      Inguinal  right  -RE      Opened Anastamoses  anterior axillo-axillary;axillo-inguinal  -RE      Axillo-Inguinal  right  -RE      Extremity Treatment  MLD to full limb  -RE         Compression/Skin Care    Compression/Skin Care  skin care;wrapping location;bandaging;remove bandages  -RE      Skin Care  washed/dried;moisturizing lotion applied  -RE      Wrapping Location  upper extremity  -RE      Wrapping Location UE  right:;hand to axilla  -RE      Bandage Layers  cotton liner;soft foam- 1/4 inch;short-stretch bandages (comment size/quantity)  -RE      Bandaging Comments  tG 7, 1-Rosidal soft, 1-6cm,1-8cm,  2-10cm Rosidal k  Added velfoam to cubital area to prevent irritation   -RE      Bandaging Technique  circumferential/spiral;light compression;moderate compression  -RE      Compression/Skin Care Comments  Wrappepd elbow in flexion  -RE        User Key  (r) = Recorded By, (t) = Taken By, (c) = Cosigned By    Initials Name Provider Type    RE Poornima Middleton OTR Occupational Therapist                  OT Assessment/Plan     Row Name 07/19/19 2516 07/18/19 1331       OT Assessment    Assessment Comments  mild increase in edema along ulna without the 4 th bandage in place.  This did decrease with MLD.  -RE  Measurements have decreased 27.5 cm since IE and 10.4 cm since last week.  Excellent progress.  Continues with edema around the elbow and along the ulna.   No finger swelling noted.     -RE       OT Plan    OT Plan Comments  continue  -RE  continue  -RE      User Key  (r) = Recorded By, (t) = Taken By, (c) = Cosigned By    Initials Name Provider Type    Poornima Torres OTR Occupational Therapist                 Manual Rx (last 36 hours)      Manual Treatments     Row Name 07/19/19 1606 07/18/19 1732          Total Minutes    03085 - OT Manual Therapy Minutes  60  -RE  60  -RE       User Key  (r) = Recorded By, (t) = Taken By, (c) = Cosigned By    Initials Name Provider Type    Poornima Torres OTR Occupational Therapist            Therapy Education  Education Details: made another Locally phone video. Demonstrated and discussed biacare sleeve for night  Program: New  How Provided: Verbal, Demonstration  Provided to: Patient, Caregiver  Level of Understanding: Teach back education performed, Verbalized  14668 - OT Self Care/Mgmt Minutes: 25                Time Calculation:   OT Start Time: 1300  OT Stop Time: 1425  OT Time Calculation (min): 85 min  Total Timed Code Minutes- OT: 85 minute(s)     Therapy Charges for Today     Code Description Service Date Service Provider Modifiers Qty    53672488461 HC OT MANUAL THERAPY EA 15 MIN 7/19/2019 Poornima Middleton OTR GO 4    05371799705 HC OT SELF CARE/MGMT/TRAIN EA 15 MIN 7/19/2019 Poornima Middleton OTR GO 2                      SURY Mae  7/19/2019

## 2019-07-22 RX ORDER — LISINOPRIL 20 MG/1
TABLET ORAL
Qty: 90 TABLET | Refills: 0 | Status: SHIPPED | OUTPATIENT
Start: 2019-07-22

## 2019-07-23 ENCOUNTER — HOSPITAL ENCOUNTER (OUTPATIENT)
Dept: OCCUPATIONAL THERAPY | Facility: HOSPITAL | Age: 79
Setting detail: THERAPIES SERIES
Discharge: HOME | End: 2019-07-23

## 2019-07-23 DIAGNOSIS — I97.2 POST-MASTECTOMY LYMPHEDEMA SYNDROME: Primary | ICD-10-CM

## 2019-07-23 PROCEDURE — 97535 SELF CARE MNGMENT TRAINING: CPT

## 2019-07-23 PROCEDURE — 97140 MANUAL THERAPY 1/> REGIONS: CPT

## 2019-07-23 NOTE — THERAPY TREATMENT NOTE
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   • Hyperglycemia   • Lymphedema of arm   • History of right breast cancer   • Renal mass, right   • Lymphadenopathy, abdominal   • Left displaced femoral neck fracture (CMS/HCC)   • Pneumonia   • Hypoxia   • Acute bronchiolitis due to human metapneumovirus        Past Medical History:   Diagnosis Date   • Arthritis    • Benign essential hypertension    • Breast cancer (CMS/HCC)     negative LN per patient- mastectomy and 5 years of tamoxifen   • Broken bones    • Chronic kidney disease     \"mass on Kidney\"  urologist says it was benign   • Coronary artery disease     MV - takes propranolol   • Disease of thyroid gland    • Hyperglycemia    • Lymphadenopathy    • MVP (mitral valve prolapse)    • Osteoporosis    • Pneumonia    • Seizures (CMS/HCC)    • Skin cancer         Past Surgical History:   Procedure Laterality Date   • BILATERAL OOPHORECTOMY     • BREAST SURGERY      Biopsy   • MASTECTOMY Right 1998   • SKIN BIOPSY     • SKIN LESION EXCISION     • TOTAL HIP ARTHROPLASTY Left 2019    Procedure: LEFT ANTERIOR TOTAL HIP ARTHROPLASTY;  Surgeon: Jak Hdz MD;  Location: [facility];  Service: Orthopedics         Visit Dx:      ICD-10-CM ICD-9-CM   1. 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  yes  -RE      " Pre-Treatment Pain Level  0  -RE      Post-Treatment Pain Level  0  -RE         Subjective Comments    Subjective Comments  bandaging is improving but still with ocasional discomfort in elbow  -RE         Manual Lymphatic Drainage    Manual Lymphatic Drainage  initial sequence;opened regional lymph nodes;opened anastamoses;extremity treatment  -RE      Initial Sequence  short neck;cervical;supraclavicular  -RE      Cervical  right;left  -RE      Supraclavicular  right;left  -RE      Opened Regional Lymph Nodes  axillary;inguinal  -RE      Axillary  left  -RE      Inguinal  right  -RE      Opened Anastamoses  anterior axillo-axillary;axillo-inguinal  -RE      Axillo-Inguinal  right  -RE      Extremity Treatment  MLD to full limb  -RE         Compression/Skin Care    Compression/Skin Care  skin care;wrapping location;bandaging  -RE      Skin Care  lotion applied;topical anti-inflammatory applied;other (comment) zinc oxide  -RE      Wrapping Location  upper extremity  -RE      Wrapping Location UE  right:;hand to axilla  -RE      Bandage Layers  cotton liner;soft foam- 1/4 inch;short-stretch bandages (comment size/quantity)  -RE      Bandaging Comments  tG 7, 1-Rosidal soft, 1-6cm,1-8cm,  2-10cm Rosidal k  Added velfoam to cubital area to prevent irritation   -RE      Bandaging Technique  circumferential/spiral;light compression;moderate compression  -RE        User Key  (r) = Recorded By, (t) = Taken By, (c) = Cosigned By    Initials Name Provider Type    Poornima Torres OTR Occupational Therapist                  OT Assessment/Plan     Row Name 07/23/19 2689          OT Assessment    Assessment Comments  Edema noted around elbow..  This did decrease with MLD.  -RE        OT Plan    OT Plan Comments  continue  -RE       User Key  (r) = Recorded By, (t) = Taken By, (c) = Cosigned By    Initials Name Provider Type    Poornima Torres OTR Occupational Therapist                 Manual Rx (last 36 hours)      Manual  Treatments     Row Name 07/23/19 1704             Total Minutes    94315 - OT Manual Therapy Minutes  65  -RE        User Key  (r) = Recorded By, (t) = Taken By, (c) = Cosigned By    Initials Name Provider Type    RE Poornima Middleton OTR Occupational Therapist            Therapy Education  Given: Edema management, Bandaging/dressing change  Program: Reinforced  How Provided: Verbal, Demonstration  Provided to: Patient, Caregiver  65457 - OT Self Care/Mgmt Minutes: 10                Time Calculation:   OT Start Time: 1300  OT Stop Time: 1415  OT Time Calculation (min): 75 min  Total Timed Code Minutes- OT: 75 minute(s)     Therapy Charges for Today     Code Description Service Date Service Provider Modifiers Qty    08124559002  OT MANUAL THERAPY EA 15 MIN 7/23/2019 Poornima Middleton OTR GO 4    14807007719 HC OT SELF CARE/MGMT/TRAIN EA 15 MIN 7/23/2019 Poornima Middleton OTR GO 1                      SURY Mae  7/23/2019

## 2019-07-25 ENCOUNTER — HOSPITAL ENCOUNTER (OUTPATIENT)
Dept: OCCUPATIONAL THERAPY | Facility: HOSPITAL | Age: 79
Setting detail: THERAPIES SERIES
Discharge: HOME | End: 2019-07-25

## 2019-07-25 DIAGNOSIS — I97.2 POST-MASTECTOMY LYMPHEDEMA SYNDROME: Primary | ICD-10-CM

## 2019-07-25 PROCEDURE — 97140 MANUAL THERAPY 1/> REGIONS: CPT

## 2019-07-25 PROCEDURE — 97535 SELF CARE MNGMENT TRAINING: CPT

## 2019-07-25 NOTE — THERAPY PROGRESS REPORT/RE-CERT
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   • Hyperglycemia   • Lymphedema of arm   • History of right breast cancer   • Renal mass, right   • Lymphadenopathy, abdominal   • Left displaced femoral neck fracture (CMS/HCC)   • Pneumonia   • Hypoxia   • Acute bronchiolitis due to human metapneumovirus        Past Medical History:   Diagnosis Date   • Arthritis    • Benign essential hypertension    • Breast cancer (CMS/HCC)     negative LN per patient- mastectomy and 5 years of tamoxifen   • Broken bones    • Chronic kidney disease     \"mass on Kidney\"  urologist says it was benign   • Coronary artery disease     MV - takes propranolol   • Disease of thyroid gland    • Hyperglycemia    • Lymphadenopathy    • MVP (mitral valve prolapse)    • Osteoporosis    • Pneumonia    • Seizures (CMS/HCC)    • Skin cancer         Past Surgical History:   Procedure Laterality Date   • BILATERAL OOPHORECTOMY     • BREAST SURGERY      Biopsy   • MASTECTOMY Right 1998   • SKIN BIOPSY     • SKIN LESION EXCISION     • TOTAL HIP ARTHROPLASTY Left 2019    Procedure: LEFT ANTERIOR TOTAL HIP ARTHROPLASTY;  Surgeon: Jak Hdz MD;  Location: [facility];  Service: Orthopedics         Visit Dx:      ICD-10-CM ICD-9-CM   1. 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  yes  -RE      " Pre-Treatment Pain Level  0  -RE      Post-Treatment Pain Level  0  -RE         Subjective Comments    Subjective Comments  No new complaints  -RE         BUE Circumferential (cm)    Measurement Location 1  Axilla  -RE      Right 1  27 cm  -RE      Measurement Location 2  15 cm above elbow  -RE      Right 2  25.5 cm  -RE      Measurement Location 3  10 cm above elbow  -RE      Right 3  26 cm  -RE      Measurement Location 4  5 cm above elbow  -RE      Right 4  27 cm  -RE      Measurement Location 5  Elbow  -RE      Right 5  26 cm  -RE      Measurement Location 6  5 cm below elbow  -RE      Right 6  26.2 cm  -RE      Measurement Location 7  10 cm below elbow  -RE      Right 7  23 cm  -RE      Measurement Location 8  15 cm below elbow  -RE      Right 8  20.2 cm  -RE      Measurement Location 9  20 cm below elbow  -RE      Right 9  16.4 cm  -RE      Measurement Location 10  Wrist  -RE      Right 10  15.5 cm  -RE      Measurement Location 11  Mid palm  -RE      Right 11  16.5 cm  -RE      RUE Circumferential Total  249.3 cm  -RE         Manual Lymphatic Drainage    Manual Lymphatic Drainage  initial sequence;opened regional lymph nodes;opened anastamoses;extremity treatment  -RE      Initial Sequence  short neck;cervical;supraclavicular  -RE      Cervical  right;left  -RE      Supraclavicular  right;left  -RE      Opened Regional Lymph Nodes  axillary;inguinal  -RE      Axillary  left  -RE      Inguinal  right  -RE      Opened Anastamoses  anterior axillo-axillary;axillo-inguinal  -RE      Axillo-Inguinal  right  -RE      Extremity Treatment  MLD to full limb  -RE         Compression/Skin Care    Compression/Skin Care  skin care;wrapping location;bandaging  -RE      Skin Care  lotion applied  -RE      Wrapping Location  upper extremity  -RE      Wrapping Location UE  right:;hand to axilla  -RE      Bandage Layers  cotton liner;soft foam- 1/4 inch;short-stretch bandages (comment size/quantity)  -RE      Bandaging  Comments  tG 7, 1-Rosidal soft, 1-6cm,1-8cm,  2-10cm Rosidal k  Added velfoam to cubital area to prevent irritation . Komprex II to elbow   -RE      Bandaging Technique  circumferential/spiral;light compression;moderate compression  -RE        User Key  (r) = Recorded By, (t) = Taken By, (c) = Cosigned By    Initials Name Provider Type    Poornima Torres OTR Occupational Therapist                  OT Assessment/Plan     Row Name 07/25/19 3254          OT Assessment    Assessment Comments  Patient has progressed well and has met most of her goals.  The elbow area continues with significant edema.  Treatment focus zoe be on this area.  Added Komprex II to the elbow today and will introduce HEP tomorrow.   -RE     OT Diagnosis  Post mastectomy lymphedema  -RE     OT Rehab Potential  Good  -RE     Patient/caregiver participated in establishment of treatment plan and goals  Yes  -RE     Patient would benefit from skilled therapy intervention  Yes  -RE        OT Plan    OT Frequency  3x/week  -RE     Predicted Duration of Therapy Intervention (Therapy Eval)  1-2 weeks  -RE     Planned CPT's?  OT THER ACT EA 15 MIN: 64480DI;OT THER PROC EA 15 MIN: 73489CQ;OT SELF CARE/MGMT/TRAIN 15 MIN: 87224;OT MANUAL THERAPY EA 15 MIN: 89902  -RE     Planned Therapy Interventions (Optional Details)  home exercise program;manual therapy techniques;patient/family education;other (see comments) skin care and compression bandaging  -RE     OT Plan Comments  continue  -RE       User Key  (r) = Recorded By, (t) = Taken By, (c) = Cosigned By    Initials Name Provider Type    Poornima Torres OTR Occupational Therapist                 Manual Rx (last 36 hours)      Manual Treatments     Row Name 07/25/19 1743             Total Minutes    14007 - OT Manual Therapy Minutes  60  -RE        User Key  (r) = Recorded By, (t) = Taken By, (c) = Cosigned By    Initials Name Provider Type    Poornima Torres OTR Occupational Therapist        OT Goals      Row Name 07/25/19 1300          OT Short Term Goals    STG Date to Achieve  08/08/19  -RE     STG 1  Patient will demonstrate proper awareness of “What is Lymphedema?” and “Healthy Habits” for improved prevention, management, care of symptoms and ease of transition to self-care of condition.   -RE     STG 1 Progress  Met  -RE     STG 2  Patient independent and compliant with self-wrapping techniques of compression bandages with family member as indicated for improved self-management of condition.  -RE     STG 2 Progress  Met  -RE     STG 3  Patient will demonstrate decreased net edema of right upper extremity >/= 10-20 cm for decrease in symptoms, decreased risk of infection and improved skin care/transition to self-care of condition.   -RE     STG 3 Progress  Met  -RE        Long Term Goals    LTG Date to Achieve  08/22/19  -RE     LTG 1  Patient will demonstrate decreased net edema of right upper extremity >/= 21-30 cm for decrease in symptoms, decreased risk of infection and improved skin care/transition to self-care of condition.   -RE     LTG 1 Progress  Progressing;Ongoing  -RE     LTG 2  Patient independent and compliant with initial home exercise program focused on diaphragmatic breathing, range of motion, flexibility to decrease edema and improve lymphatic flow for decreased edema and decreased risk of infection.   -RE     LTG 2 Progress  Ongoing  -RE     LTG 3  Patient independent and compliant with compression garments and night compression as indicated for self-management of edema.   -RE     LTG 3 Progress  Ongoing  -RE        Time Calculation    OT Goal Re-Cert Due Date  10/25/19  -RE       User Key  (r) = Recorded By, (t) = Taken By, (c) = Cosigned By    Initials Name Provider Type    Poornima Torres OTR Occupational Therapist          Therapy Education  Education Details: Discussed progress.   Given: Symptoms/condition management, Edema management, Bandaging/dressing change  Program:  Reinforced  How Provided: Verbal  Provided to: Patient  Level of Understanding: Teach back education performed, Verbalized  31188 - OT Self Care/Mgmt Minutes: 15                Time Calculation:   OT Start Time: 1300  OT Stop Time: 1415  OT Time Calculation (min): 75 min  Total Timed Code Minutes- OT: 75 minute(s)     Therapy Charges for Today     Code Description Service Date Service Provider Modifiers Qty    24795022933 HC OT MANUAL THERAPY EA 15 MIN 7/25/2019 Poornima Middleton OTR GO 4    [id number]  OT SELF CARE/MGMT/TRAIN EA 15 MIN 7/25/2019 Poornima Middleton OTR GO 1                      SURY Mae  7/25/2019

## 2019-07-26 ENCOUNTER — HOSPITAL ENCOUNTER (OUTPATIENT)
Dept: OCCUPATIONAL THERAPY | Facility: HOSPITAL | Age: 79
Setting detail: THERAPIES SERIES
Discharge: HOME | End: 2019-07-26

## 2019-07-26 DIAGNOSIS — I97.2 POST-MASTECTOMY LYMPHEDEMA SYNDROME: Primary | ICD-10-CM

## 2019-07-26 PROCEDURE — 97140 MANUAL THERAPY 1/> REGIONS: CPT

## 2019-07-26 PROCEDURE — 97535 SELF CARE MNGMENT TRAINING: CPT

## 2019-07-26 RX ORDER — POTASSIUM CHLORIDE 20 MEQ/1
TABLET, EXTENDED RELEASE ORAL
Qty: 90 TABLET | Refills: 0 | Status: SHIPPED | OUTPATIENT
Start: 2019-07-26

## 2019-07-26 NOTE — THERAPY TREATMENT NOTE
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   • Hyperglycemia   • Lymphedema of arm   • History of right breast cancer   • Renal mass, right   • Lymphadenopathy, abdominal   • Left displaced femoral neck fracture (CMS/HCC)   • Pneumonia   • Hypoxia   • Acute bronchiolitis due to human metapneumovirus        Past Medical History:   Diagnosis Date   • Arthritis    • Benign essential hypertension    • Breast cancer (CMS/HCC)     negative LN per patient- mastectomy and 5 years of tamoxifen   • Broken bones    • Chronic kidney disease     \"mass on Kidney\"  urologist says it was benign   • Coronary artery disease     MV - takes propranolol   • Disease of thyroid gland    • Hyperglycemia    • Lymphadenopathy    • MVP (mitral valve prolapse)    • Osteoporosis    • Pneumonia    • Seizures (CMS/HCC)    • Skin cancer         Past Surgical History:   Procedure Laterality Date   • BILATERAL OOPHORECTOMY     • BREAST SURGERY      Biopsy   • MASTECTOMY Right 1998   • SKIN BIOPSY     • SKIN LESION EXCISION     • TOTAL HIP ARTHROPLASTY Left 2019    Procedure: LEFT ANTERIOR TOTAL HIP ARTHROPLASTY;  Surgeon: Jak Hdz MD;  Location: [facility];  Service: Orthopedics         Visit Dx:      ICD-10-CM ICD-9-CM   1. 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  yes  -RE      " Pre-Treatment Pain Level  0  -RE      Post-Treatment Pain Level  0  -RE         Skin Changes/Observations    Skin Observations Comment  smallred area upper arm  -RE         Manual Lymphatic Drainage    Manual Lymphatic Drainage  initial sequence;opened regional lymph nodes;opened anastamoses;extremity treatment  -RE      Initial Sequence  short neck;cervical;supraclavicular  -RE      Cervical  right;left  -RE      Supraclavicular  right;left  -RE      Opened Regional Lymph Nodes  axillary;inguinal  -RE      Axillary  left  -RE      Inguinal  right  -RE      Opened Anastamoses  anterior axillo-axillary;posterior axillo-axillary  -RE      Axillo-Inguinal  right  -RE      Extremity Treatment  MLD to full limb  -RE         Compression/Skin Care    Compression/Skin Care  skin care;wrapping location;bandaging;remove bandages  -RE      Skin Care  lotion applied;topical anti-inflammatory applied  -RE      Wrapping Location  upper extremity  -RE      Wrapping Location UE  right:;hand to axilla  -RE      Bandage Layers  cotton liner;soft foam- 1/4 inch;short-stretch bandages (comment size/quantity)  -RE      Bandaging Comments  tG 7, 1-Rosidal soft, 1-6cm,1-8cm,  2-10cm Rosidal k. artiflex unter stockingette to decrease irritation  -RE      Bandaging Technique  circumferential/spiral;light compression;moderate compression  -RE        User Key  (r) = Recorded By, (t) = Taken By, (c) = Cosigned By    Initials Name Provider Type    Poornima Torres OTR Occupational Therapist                  OT Assessment/Plan     Row Name 07/26/19 6890 07/25/19 3752       OT Assessment    Assessment Comments  Kpmprex II did decrease ebow edema but is too aggressive to use daily with this patient.  Will use again next week.  progressing.  -RE  Patient has progressed well and has met most of her goals.  The elbow area continues with significant edema.  Treatment focus zoe be on this area.  Added Komprex II to the elbow today and will introduce  HEP tomorrow.   -RE    OT Diagnosis  --  Post mastectomy lymphedema  -RE    OT Rehab Potential  --  Good  -RE    Patient/caregiver participated in establishment of treatment plan and goals  --  Yes  -RE    Patient would benefit from skilled therapy intervention  --  Yes  -RE       OT Plan    OT Frequency  --  3x/week  -RE    Predicted Duration of Therapy Intervention (Therapy Eval)  --  1-2 weeks  -RE    Planned CPT's?  --  OT THER ACT EA 15 MIN: 32719OJ;OT THER PROC EA 15 MIN: 54378HC;OT SELF CARE/MGMT/TRAIN 15 MIN: 88485;OT MANUAL THERAPY EA 15 MIN: 68339  -RE    Planned Therapy Interventions (Optional Details)  --  home exercise program;manual therapy techniques;patient/family education;other (see comments) skin care and compression bandaging  -RE    OT Plan Comments  continue  -RE  continue  -RE      User Key  (r) = Recorded By, (t) = Taken By, (c) = Cosigned By    Initials Name Provider Type    Poornima Torres OTR Occupational Therapist                 Manual Rx (last 36 hours)      Manual Treatments     Row Name 07/26/19 1701 07/25/19 1749          Total Minutes    06678 - OT Manual Therapy Minutes  55  -RE  60  -RE       User Key  (r) = Recorded By, (t) = Taken By, (c) = Cosigned By    Initials Name Provider Type    Poornima Torres OTR Occupational Therapist            Therapy Education  Education Details: reviewed bandaging with friend  Program: Reinforced  How Provided: Verbal, Demonstration  Provided to: Patient, Caregiver  Level of Understanding: Teach back education performed, Verbalized  68115 - OT Self Care/Mgmt Minutes: 15                Time Calculation:   OT Start Time: 1310  OT Stop Time: 1420  OT Time Calculation (min): 70 min  Total Timed Code Minutes- OT: 70 minute(s)     Therapy Charges for Today     Code Description Service Date Service Provider Modifiers Qty    [id number]  OT MANUAL THERAPY EA 15 MIN 7/26/2019 Poornima Middleton OTR GO 4    64822830070 HC OT SELF CARE/MGMT/TRAIN EA 15 MIN  7/26/2019 Poornima Middleton, OTR GO 1                      Poornima Middleton, OTHAYDE  7/26/2019

## 2019-07-30 ENCOUNTER — HOSPITAL ENCOUNTER (OUTPATIENT)
Dept: OCCUPATIONAL THERAPY | Facility: HOSPITAL | Age: 79
Setting detail: THERAPIES SERIES
Discharge: HOME | End: 2019-07-30

## 2019-07-30 DIAGNOSIS — I97.2 POST-MASTECTOMY LYMPHEDEMA SYNDROME: Primary | ICD-10-CM

## 2019-07-30 PROCEDURE — 97140 MANUAL THERAPY 1/> REGIONS: CPT

## 2019-07-30 PROCEDURE — 97535 SELF CARE MNGMENT TRAINING: CPT

## 2019-07-30 NOTE — THERAPY TREATMENT NOTE
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   • Hyperglycemia   • Lymphedema of arm   • History of right breast cancer   • Renal mass, right   • Lymphadenopathy, abdominal   • Left displaced femoral neck fracture (CMS/HCC)   • Pneumonia   • Hypoxia   • Acute bronchiolitis due to human metapneumovirus        Past Medical History:   Diagnosis Date   • Arthritis    • Benign essential hypertension    • Breast cancer (CMS/HCC)     negative LN per patient- mastectomy and 5 years of tamoxifen   • Broken bones    • Chronic kidney disease     \"mass on Kidney\"  urologist says it was benign   • Coronary artery disease     MV - takes propranolol   • Disease of thyroid gland    • Hyperglycemia    • Lymphadenopathy    • MVP (mitral valve prolapse)    • Osteoporosis    • Pneumonia    • Seizures (CMS/HCC)    • Skin cancer         Past Surgical History:   Procedure Laterality Date   • BILATERAL OOPHORECTOMY     • BREAST SURGERY      Biopsy   • MASTECTOMY Right 1998   • SKIN BIOPSY     • SKIN LESION EXCISION     • TOTAL HIP ARTHROPLASTY Left 2019    Procedure: LEFT ANTERIOR TOTAL HIP ARTHROPLASTY;  Surgeon: Jak Hdz MD;  Location: [facility];  Service: Orthopedics         Visit Dx:      ICD-10-CM ICD-9-CM   1. 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  yes  -RE      " Pre-Treatment Pain Level  0  -RE      Post-Treatment Pain Level  0  -RE         Subjective Comments    Subjective Comments  Had some trouble with bandage being too tight.   -RE         Manual Lymphatic Drainage    Manual Lymphatic Drainage  initial sequence;opened regional lymph nodes;opened anastamoses;extremity treatment  -RE      Initial Sequence  short neck;cervical;supraclavicular  -RE      Cervical  right;left  -RE      Supraclavicular  right;left  -RE      Opened Regional Lymph Nodes  axillary;inguinal  -RE      Axillary  left  -RE      Inguinal  right  -RE      Opened Anastamoses  anterior axillo-axillary;posterior axillo-axillary  -RE      Axillo-Inguinal  right  -RE      Extremity Treatment  MLD to full limb  -RE         Compression/Skin Care    Compression/Skin Care  skin care;wrapping location;bandaging;remove bandages  -RE      Skin Care  lotion applied;topical anti-inflammatory applied  -RE      Wrapping Location  upper extremity  -RE      Wrapping Location UE  right:;hand to axilla  -RE      Bandage Layers  cotton liner;soft foam- 1/4 inch;short-stretch bandages (comment size/quantity)  -RE      Bandaging Comments  tG 7, 1-Rosidal soft, 1-6cm,1-8cm,  2-10cm Rosidal k. artiflex unter stockingette to decrease irritation  -RE      Bandaging Technique  circumferential/spiral;light compression;moderate compression  -RE        User Key  (r) = Recorded By, (t) = Taken By, (c) = Cosigned By    Initials Name Provider Type    Poornima Torres OTR Occupational Therapist                  OT Assessment/Plan     Row Name 07/30/19 2320          OT Assessment    Assessment Comments  Elbow softer today.  Is still having some difficulty with bandaging but this is improved as well.    -RE        OT Plan    OT Plan Comments  continue  -RE       User Key  (r) = Recorded By, (t) = Taken By, (c) = Cosigned By    Initials Name Provider Type    Poornima Torres OTR Occupational Therapist                 Manual Rx (last 36  hours)      Manual Treatments     Row Name 07/30/19 1206             Total Minutes    74618 - OT Manual Therapy Minutes  65  -RE        User Key  (r) = Recorded By, (t) = Taken By, (c) = Cosigned By    Initials Name Provider Type    RE Poornima Middleton OTR Occupational Therapist            Therapy Education  Given: Symptoms/condition management, Edema management  Program: Reinforced  How Provided: Verbal, Demonstration  Provided to: Patient  Level of Understanding: Teach back education performed, Verbalized  42635 - OT Self Care/Mgmt Minutes: 10                Time Calculation:   OT Start Time: 1000  OT Stop Time: 1115  OT Time Calculation (min): 75 min  Total Timed Code Minutes- OT: 75 minute(s)     Therapy Charges for Today     Code Description Service Date Service Provider Modifiers Qty    [id number]  OT MANUAL THERAPY EA 15 MIN 7/30/2019 Poornima Middleton OTR GO 4    [id number]  OT SELF CARE/MGMT/TRAIN EA 15 MIN 7/30/2019 Poornima Middleton OTR GO 1                      SURY Mae  7/30/2019

## 2019-07-31 ENCOUNTER — OFFICE VISIT (OUTPATIENT)
Dept: NEUROSURGERY | Facility: CLINIC | Age: 79
End: 2019-07-31

## 2019-07-31 VITALS
SYSTOLIC BLOOD PRESSURE: 118 MMHG | DIASTOLIC BLOOD PRESSURE: 62 MMHG | BODY MASS INDEX: 24.66 KG/M2 | WEIGHT: 148 LBS | RESPIRATION RATE: 18 BRPM | HEIGHT: 65 IN | HEART RATE: 70 BPM

## 2019-07-31 DIAGNOSIS — D32.0 MENINGIOMA, CEREBRAL (HCC): Primary | ICD-10-CM

## 2019-07-31 PROCEDURE — 99214 OFFICE O/P EST MOD 30 MIN: CPT | Performed by: NEUROLOGICAL SURGERY

## 2019-07-31 NOTE — PROGRESS NOTES
Subjective   Patient ID: Karolina Ornelas is a 79 y.o. female is here today for follow-up meningioma. Patient presents accompanied by her sister.     History of Present Illness 78 yo female with history of presumptive meningoima offered resection previously. No headaches or vision change.  No seizure.  She did suffer a fall recently.      The following portions of the patient's history were reviewed and updated as appropriate: allergies, current medications, past family history, past medical history, past social history, past surgical history and problem list.    Review of Systems   HENT: Negative for tinnitus.    Eyes: Negative for visual disturbance.   Musculoskeletal: Positive for gait problem.   Neurological: Negative for dizziness, tremors, seizures, syncope, speech difficulty, weakness, light-headedness, numbness and headaches.   Psychiatric/Behavioral: Negative for confusion and decreased concentration.       Objective   Physical Exam  Neurologic Exam     4-6-5  No drift  Ft n intact  Face symmetric  Cn 2-12 intact    Assessment/Plan   Independent Review of Radiographic Studies:    MRI reveals some edema but stable size of tumor    Medical Decision Making:  Stable tumor size.  I offered her surgery again  and she deferred again. I will continue to follow this.  She is very assertive but I enjoy her as a patient and will gladly follow her.  We will check her in 6 months and ssoner as needed and we discussed reasons to call again.      Karolina was seen today for brain tumor.    Diagnoses and all orders for this visit:    Meningioma, cerebral (CMS/HCC)  -     MRI Brain With & Without Contrast; Future      No Follow-up on file.

## 2019-08-01 ENCOUNTER — HOSPITAL ENCOUNTER (OUTPATIENT)
Dept: OCCUPATIONAL THERAPY | Facility: HOSPITAL | Age: 79
Setting detail: THERAPIES SERIES
Discharge: HOME | End: 2019-08-01

## 2019-08-01 DIAGNOSIS — I97.2 POST-MASTECTOMY LYMPHEDEMA SYNDROME: Primary | ICD-10-CM

## 2019-08-01 PROCEDURE — 97535 SELF CARE MNGMENT TRAINING: CPT

## 2019-08-01 PROCEDURE — 97140 MANUAL THERAPY 1/> REGIONS: CPT

## 2019-08-01 RX ORDER — LEVOTHYROXINE SODIUM 0.07 MG/1
75 TABLET ORAL DAILY
Qty: 30 TABLET | Refills: 3 | Status: SHIPPED | OUTPATIENT
Start: 2019-08-01 | End: 2019-08-12 | Stop reason: SDUPTHER

## 2019-08-01 NOTE — THERAPY TREATMENT NOTE
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   • Hyperglycemia   • Lymphedema of arm   • History of right breast cancer   • Renal mass, right   • Lymphadenopathy, abdominal   • Left displaced femoral neck fracture (CMS/HCC)   • Pneumonia   • Hypoxia   • Acute bronchiolitis due to human metapneumovirus        Past Medical History:   Diagnosis Date   • Arthritis    • Benign essential hypertension    • Breast cancer (CMS/HCC)     negative LN per patient- mastectomy and 5 years of tamoxifen   • Broken bones    • Chronic kidney disease     \"mass on Kidney\"  urologist says it was benign   • Coronary artery disease     MV - takes propranolol   • Disease of thyroid gland    • Hyperglycemia    • Lymphadenopathy    • MVP (mitral valve prolapse)    • Osteoporosis    • Pneumonia    • Seizures (CMS/HCC)    • Skin cancer         Past Surgical History:   Procedure Laterality Date   • BILATERAL OOPHORECTOMY     • BREAST SURGERY      Biopsy   • MASTECTOMY Right 1998   • SKIN BIOPSY     • SKIN LESION EXCISION     • TOTAL HIP ARTHROPLASTY Left 2019    Procedure: LEFT ANTERIOR TOTAL HIP ARTHROPLASTY;  Surgeon: Jak Hdz MD;  Location: [facility];  Service: Orthopedics         Visit Dx:      ICD-10-CM ICD-9-CM   1. 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  yes  -RE      " Pre-Treatment Pain Level  0  -RE      Post-Treatment Pain Level  0  -RE         Subjective Comments    Subjective Comments  Bandages fell off last night  -RE         Manual Lymphatic Drainage    Manual Lymphatic Drainage  initial sequence;opened regional lymph nodes;opened anastamoses;extremity treatment  -RE      Initial Sequence  short neck;cervical;supraclavicular  -RE      Cervical  right;left  -RE      Supraclavicular  right;left  -RE      Opened Regional Lymph Nodes  axillary;inguinal  -RE      Axillary  left  -RE      Inguinal  right  -RE      Opened Anastamoses  anterior axillo-axillary;posterior axillo-axillary  -RE      Axillo-Inguinal  right  -RE      Extremity Treatment  MLD to full limb  -RE         Compression/Skin Care    Compression/Skin Care  skin care;wrapping location;bandaging;remove bandages  -RE      Skin Care  lotion applied;topical anti-inflammatory applied  -RE      Wrapping Location  upper extremity  -RE      Wrapping Location UE  right:;hand to axilla  -RE      Bandage Layers  cotton liner;soft foam- 1/4 inch;short-stretch bandages (comment size/quantity)  -RE      Bandaging Comments  tG 7, 1-Rosidal soft, 1-6cm,1-8cm,  2-10cm Rosidal k. Komprex Ii  -RE      Bandaging Technique  circumferential/spiral;light compression;moderate compression  -RE        User Key  (r) = Recorded By, (t) = Taken By, (c) = Cosigned By    Initials Name Provider Type    Poornima Torres OTR Occupational Therapist                  OT Assessment/Plan     Row Name 08/01/19 5058          OT Assessment    Assessment Comments  some increased edema today due to problems with bandge.  -RE        OT Plan    OT Plan Comments  measure tomorrow to assess progress.  -RE       User Key  (r) = Recorded By, (t) = Taken By, (c) = Cosigned By    Initials Name Provider Type    Poornima Torres OTR Occupational Therapist                 Manual Rx (last 36 hours)      Manual Treatments     Row Name 08/01/19 1562             Total  Minutes    87702 - OT Manual Therapy Minutes  65  -RE        User Key  (r) = Recorded By, (t) = Taken By, (c) = Cosigned By    Initials Name Provider Type    Poornima Torres OTR Occupational Therapist            Therapy Education  Education Details: discussed progress  Given: Symptoms/condition management, Edema management  Program: Reinforced  How Provided: Verbal, Demonstration  Provided to: Patient  Level of Understanding: Teach back education performed, Verbalized  31804 - OT Self Care/Mgmt Minutes: 10                Time Calculation:   OT Start Time: 1300  OT Stop Time: 1415  OT Time Calculation (min): 75 min  Total Timed Code Minutes- OT: 75 minute(s)     Therapy Charges for Today     Code Description Service Date Service Provider Modifiers Qty    98417681318  OT MANUAL THERAPY EA 15 MIN 8/1/2019 Poornima Middleton OTR GO 4    [id number]  OT SELF CARE/MGMT/TRAIN EA 15 MIN 8/1/2019 Poornima Middleton OTR GO 1                      SURY Mae  8/1/2019

## 2019-08-02 ENCOUNTER — HOSPITAL ENCOUNTER (OUTPATIENT)
Dept: OCCUPATIONAL THERAPY | Facility: HOSPITAL | Age: 79
Setting detail: THERAPIES SERIES
Discharge: HOME | End: 2019-08-02

## 2019-08-02 DIAGNOSIS — I97.2 POST-MASTECTOMY LYMPHEDEMA SYNDROME: Primary | ICD-10-CM

## 2019-08-02 PROCEDURE — 97535 SELF CARE MNGMENT TRAINING: CPT

## 2019-08-02 PROCEDURE — 97140 MANUAL THERAPY 1/> REGIONS: CPT

## 2019-08-02 NOTE — THERAPY TREATMENT NOTE
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   • Hyperglycemia   • Lymphedema of arm   • History of right breast cancer   • Renal mass, right   • Lymphadenopathy, abdominal   • Left displaced femoral neck fracture (CMS/HCC)   • Pneumonia   • Hypoxia   • Acute bronchiolitis due to human metapneumovirus        Past Medical History:   Diagnosis Date   • Arthritis    • Benign essential hypertension    • Breast cancer (CMS/HCC)     negative LN per patient- mastectomy and 5 years of tamoxifen   • Broken bones    • Chronic kidney disease     \"mass on Kidney\"  urologist says it was benign   • Coronary artery disease     MV - takes propranolol   • Disease of thyroid gland    • Hyperglycemia    • Lymphadenopathy    • MVP (mitral valve prolapse)    • Osteoporosis    • Pneumonia    • Seizures (CMS/HCC)    • Skin cancer         Past Surgical History:   Procedure Laterality Date   • BILATERAL OOPHORECTOMY     • BREAST SURGERY      Biopsy   • MASTECTOMY Right 1998   • SKIN BIOPSY     • SKIN LESION EXCISION     • TOTAL HIP ARTHROPLASTY Left 2019    Procedure: LEFT ANTERIOR TOTAL HIP ARTHROPLASTY;  Surgeon: Jak Hdz MD;  Location: [facility];  Service: Orthopedics         Visit Dx:      ICD-10-CM ICD-9-CM   1. 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  yes  -RE      " Pre-Treatment Pain Level  0  -RE      Post-Treatment Pain Level  0  -RE         Subjective Comments    Subjective Comments  no new c/o  -RE         BUE Circumferential (cm)    Measurement Location 1  Axilla  -RE      Right 1  26 cm  -RE      Measurement Location 2  15 cm above elbow  -RE      Right 2  25.5 cm  -RE      Measurement Location 3  10 cm above elbow  -RE      Right 3  25 cm  -RE      Measurement Location 4  5 cm above elbow  -RE      Right 4  24.5 cm  -RE      Measurement Location 5  Elbow  -RE      Right 5  23.5 cm  -RE      Measurement Location 6  5 cm below elbow  -RE      Right 6  25.5 cm  -RE      Measurement Location 7  10 cm below elbow  -RE      Right 7  23.8 cm  -RE      Measurement Location 8  15 cm below elbow  -RE      Right 8  19.8 cm  -RE      Measurement Location 9  20 cm below elbow  -RE      Right 9  16.8 cm  -RE      Measurement Location 10  Wrist  -RE      Right 10  15.3 cm  -RE      Measurement Location 11  Mid palm  -RE      Right 11  16 cm  -RE      RUE Circumferential Total  241.7 cm  -RE         Manual Lymphatic Drainage    Manual Lymphatic Drainage  initial sequence;opened regional lymph nodes;opened anastamoses;extremity treatment  -RE      Initial Sequence  short neck;cervical;supraclavicular  -RE      Cervical  right;left  -RE      Supraclavicular  right;left  -RE      Opened Regional Lymph Nodes  axillary;inguinal  -RE      Axillary  left  -RE      Inguinal  right  -RE      Opened Anastamoses  anterior axillo-axillary;posterior axillo-axillary  -RE      Axillo-Inguinal  right  -RE      Extremity Treatment  MLD to full limb  -RE         Compression/Skin Care    Compression/Skin Care  skin care;wrapping location;bandaging;remove bandages  -RE      Skin Care  lotion applied;topical anti-inflammatory applied  -RE      Wrapping Location  upper extremity  -RE      Wrapping Location UE  right:;hand to axilla  -RE      Bandage Layers  cotton liner;soft foam- 1/4 inch;short-stretch  bandages (comment size/quantity)  -RE      Bandaging Technique  circumferential/spiral;light compression;moderate compression  -RE        User Key  (r) = Recorded By, (t) = Taken By, (c) = Cosigned By    Initials Name Provider Type    Poornima Torres OTR Occupational Therapist                  OT Assessment/Plan     Row Name 08/02/19 1816 08/01/19 1828       OT Assessment    Assessment Comments  Measurements indicate continued decrease in edema .  Good progress.  -RE  some increased edema today due to problems with bandge.  -RE       OT Plan    OT Plan Comments  continue  -RE  measure tomorrow to assess progress.  -RE      User Key  (r) = Recorded By, (t) = Taken By, (c) = Cosigned By    Initials Name Provider Type    Poornima Torres OTR Occupational Therapist                 Manual Rx (last 36 hours)      Manual Treatments     Row Name 08/02/19 1817 08/01/19 1828          Total Minutes    58799 - OT Manual Therapy Minutes  55  -RE  65  -RE       User Key  (r) = Recorded By, (t) = Taken By, (c) = Cosigned By    Initials Name Provider Type    Poornima Torres OTR Occupational Therapist            Therapy Education  Education Details: Demonstrated froylan care and REady Wrap  Given: Symptoms/condition management, Edema management  Program: Reinforced  How Provided: Verbal, Demonstration  Provided to: Patient, Caregiver  Level of Understanding: Teach back education performed, Verbalized  70598 - OT Self Care/Mgmt Minutes: 30                Time Calculation:   OT Start Time: 1305  OT Stop Time: 1430  OT Time Calculation (min): 85 min  Total Timed Code Minutes- OT: 85 minute(s)     Therapy Charges for Today     Code Description Service Date Service Provider Modifiers Qty    88353063799 HC OT MANUAL THERAPY EA 15 MIN 8/2/2019 Poornima Middleton OTR GO 4    04953502640 HC OT SELF CARE/MGMT/TRAIN EA 15 MIN 8/2/2019 Poornima Middleton OTR GO 2                      SURY Mae  8/2/2019

## 2019-08-06 ENCOUNTER — HOSPITAL ENCOUNTER (OUTPATIENT)
Dept: OCCUPATIONAL THERAPY | Facility: HOSPITAL | Age: 79
Setting detail: THERAPIES SERIES
Discharge: HOME | End: 2019-08-06

## 2019-08-06 DIAGNOSIS — I97.2 POST-MASTECTOMY LYMPHEDEMA SYNDROME: Primary | ICD-10-CM

## 2019-08-06 PROCEDURE — 97535 SELF CARE MNGMENT TRAINING: CPT

## 2019-08-06 PROCEDURE — 97140 MANUAL THERAPY 1/> REGIONS: CPT

## 2019-08-06 NOTE — THERAPY TREATMENT NOTE
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   • Hyperglycemia   • Lymphedema of arm   • History of right breast cancer   • Renal mass, right   • Lymphadenopathy, abdominal   • Left displaced femoral neck fracture (CMS/HCC)   • Pneumonia   • Hypoxia   • Acute bronchiolitis due to human metapneumovirus        Past Medical History:   Diagnosis Date   • Arthritis    • Benign essential hypertension    • Breast cancer (CMS/HCC)     negative LN per patient- mastectomy and 5 years of tamoxifen   • Broken bones    • Chronic kidney disease     \"mass on Kidney\"  urologist says it was benign   • Coronary artery disease     MV - takes propranolol   • Disease of thyroid gland    • Hyperglycemia    • Lymphadenopathy    • MVP (mitral valve prolapse)    • Osteoporosis    • Pneumonia    • Seizures (CMS/HCC)    • Skin cancer         Past Surgical History:   Procedure Laterality Date   • BILATERAL OOPHORECTOMY     • BREAST SURGERY      Biopsy   • MASTECTOMY Right 1998   • SKIN BIOPSY     • SKIN LESION EXCISION     • TOTAL HIP ARTHROPLASTY Left 2019    Procedure: LEFT ANTERIOR TOTAL HIP ARTHROPLASTY;  Surgeon: Jak Hdz MD;  Location: [facility];  Service: Orthopedics         Visit Dx:      ICD-10-CM ICD-9-CM   1. 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  yes  -RE      " Pre-Treatment Pain Level  0  -RE      Post-Treatment Pain Level  0  -RE         Subjective Comments    Subjective Comments  No new c/o  -RE         Manual Lymphatic Drainage    Manual Lymphatic Drainage  initial sequence;opened regional lymph nodes;opened anastamoses;extremity treatment  -RE      Initial Sequence  short neck;cervical;supraclavicular  -RE      Cervical  right;left  -RE      Supraclavicular  right;left  -RE      Opened Regional Lymph Nodes  axillary;inguinal  -RE      Axillary  left  -RE      Inguinal  right  -RE      Opened Anastamoses  anterior axillo-axillary;posterior axillo-axillary  -RE      Axillo-Inguinal  right  -RE      Extremity Treatment  MLD to full limb  -RE         Compression/Skin Care    Compression/Skin Care  skin care;wrapping location;bandaging;remove bandages  -RE      Skin Care  lotion applied  -RE      Wrapping Location  upper extremity  -RE      Wrapping Location UE  right:;hand to axilla  -RE      Bandage Layers  cotton liner;soft foam- 1/4 inch;short-stretch bandages (comment size/quantity)  -RE      Bandaging Comments  tG 7, 1-Rosidal soft, 1-6cm,1-8cm,  2-10cm Rosidal k. Komprex II to elbow area  -RE      Bandaging Technique  circumferential/spiral;light compression;moderate compression  -RE        User Key  (r) = Recorded By, (t) = Taken By, (c) = Cosigned By    Initials Name Provider Type    Poornima Torres OTR Occupational Therapist                  OT Assessment/Plan     Row Name 08/06/19 1229          OT Assessment    Assessment Comments  Increased edema at ebow noted. Even with assistance she isnot sonido to apply the bandage tight enough.  -RE        OT Plan    OT Plan Comments  continue  -RE       User Key  (r) = Recorded By, (t) = Taken By, (c) = Cosigned By    Initials Name Provider Type    Poornima Torres OTR Occupational Therapist                 Manual Rx (last 36 hours)      Manual Treatments     Row Name 08/06/19 1529             Total Minutes    79631 - OT  Manual Therapy Minutes  60  -RE        User Key  (r) = Recorded By, (t) = Taken By, (c) = Cosigned By    Initials Name Provider Type    RE Poornima Middleton OTR Occupational Therapist            Therapy Education  Education Details: Discussed donning aides and disease progression also the role of pumps. Gave standard HEP and reviewed  Given: Symptoms/condition management, Edema management  Program: Progressed  How Provided: Verbal  Provided to: Patient  Level of Understanding: Teach back education performed, Verbalized  58779 - OT Self Care/Mgmt Minutes: 15                Time Calculation:   OT Start Time: 1300  OT Stop Time: 1415  OT Time Calculation (min): 75 min  Total Timed Code Minutes- OT: 75 minute(s)     Therapy Charges for Today     Code Description Service Date Service Provider Modifiers Qty    90538444437  OT MANUAL THERAPY EA 15 MIN 8/6/2019 Poornima Middleton OTR GO 4    [id number]  OT SELF CARE/MGMT/TRAIN EA 15 MIN 8/6/2019 Poornima Middleton OTR GO 1                      SURY Mae  8/6/2019

## 2019-08-08 ENCOUNTER — HOSPITAL ENCOUNTER (OUTPATIENT)
Dept: OCCUPATIONAL THERAPY | Facility: HOSPITAL | Age: 79
Setting detail: THERAPIES SERIES
Discharge: HOME | End: 2019-08-08

## 2019-08-08 DIAGNOSIS — I97.2 POST-MASTECTOMY LYMPHEDEMA SYNDROME: Primary | ICD-10-CM

## 2019-08-08 PROCEDURE — 97535 SELF CARE MNGMENT TRAINING: CPT

## 2019-08-08 PROCEDURE — 97140 MANUAL THERAPY 1/> REGIONS: CPT

## 2019-08-08 NOTE — THERAPY TREATMENT NOTE
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   • Hyperglycemia   • Lymphedema of arm   • History of right breast cancer   • Renal mass, right   • Lymphadenopathy, abdominal   • Left displaced femoral neck fracture (CMS/HCC)   • Pneumonia   • Hypoxia   • Acute bronchiolitis due to human metapneumovirus        Past Medical History:   Diagnosis Date   • Arthritis    • Benign essential hypertension    • Breast cancer (CMS/HCC)     negative LN per patient- mastectomy and 5 years of tamoxifen   • Broken bones    • Chronic kidney disease     \"mass on Kidney\"  urologist says it was benign   • Coronary artery disease     MV - takes propranolol   • Disease of thyroid gland    • Hyperglycemia    • Lymphadenopathy    • MVP (mitral valve prolapse)    • Osteoporosis    • Pneumonia    • Seizures (CMS/HCC)    • Skin cancer         Past Surgical History:   Procedure Laterality Date   • BILATERAL OOPHORECTOMY     • BREAST SURGERY      Biopsy   • MASTECTOMY Right 1998   • SKIN BIOPSY     • SKIN LESION EXCISION     • TOTAL HIP ARTHROPLASTY Left 2019    Procedure: LEFT ANTERIOR TOTAL HIP ARTHROPLASTY;  Surgeon: Jak Hdz MD;  Location: [facility];  Service: Orthopedics         Visit Dx:      ICD-10-CM ICD-9-CM   1. 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  yes  -RE      " Pre-Treatment Pain Level  0  -RE      Post-Treatment Pain Level  0  -RE         Subjective Comments    Subjective Comments  no new c/o  -RE         BUE Circumferential (cm)    Measurement Location 1  Axilla  -RE      Right 1  26 cm  -RE      Measurement Location 2  15 cm above elbow  -RE      Right 2  25 cm  -RE      Measurement Location 3  10 cm above elbow  -RE      Right 3  25.5 cm  -RE      Measurement Location 4  5 cm above elbow  -RE      Right 4  25 cm  -RE      Measurement Location 5  Elbow  -RE      Right 5  24.5 cm  -RE      Measurement Location 6  5 cm below elbow  -RE      Right 6  25.2 cm  -RE      Measurement Location 7  10 cm below elbow  -RE      Right 7  24.5 cm  -RE      Measurement Location 8  15 cm below elbow  -RE      Right 8  18.8 cm  -RE      Measurement Location 9  20 cm below elbow  -RE      Right 9  16.8 cm  -RE      Measurement Location 10  Wrist  -RE      Right 10  15.8 cm  -RE      Measurement Location 11  Mid palm  -RE      Right 11  16.5 cm  -RE      RUE Circumferential Total  243.6 cm  -RE         Manual Lymphatic Drainage    Manual Lymphatic Drainage  initial sequence;opened regional lymph nodes;opened anastamoses;extremity treatment  -RE      Initial Sequence  short neck  -RE      Opened Regional Lymph Nodes  inguinal  -RE      Inguinal  right  -RE      Opened Anastamoses  axillo-inguinal  -RE      Axillo-Inguinal  right  -RE      Extremity Treatment  MLD to full limb  -RE         Compression/Skin Care    Compression/Skin Care  skin care;wrapping location;bandaging;remove bandages  -RE      Skin Care  washed/dried;lotion applied  -RE      Wrapping Location  upper extremity  -RE      Wrapping Location UE  right:;hand to axilla  -RE      Bandage Layers  cotton liner;soft foam- 1/4 inch;short-stretch bandages (comment size/quantity)  -RE      Bandaging Comments  tG 7, 1-Rosidal soft, 1-6cm,1-8cm,  2-10cm Rosidal k.  -RE      Bandaging Technique  circumferential/spiral;light  compression;moderate compression  -RE        User Key  (r) = Recorded By, (t) = Taken By, (c) = Cosigned By    Initials Name Provider Type    Poornima Torres OTR Occupational Therapist                  OT Assessment/Plan     Row Name 08/08/19 1608          OT Assessment    Assessment Comments  Little change in measurements.  Patient having difficulty donning and doffing 30-40 mmHg garments due to decreased strength and coordination.    -RE        OT Plan    OT Plan Comments  continue to address donning and doffing garments tomorrow.  Will try lower comprssion classes or alternative products.   -RE       User Key  (r) = Recorded By, (t) = Taken By, (c) = Cosigned By    Initials Name Provider Type    Poornima Torres OTR Occupational Therapist                 Manual Rx (last 36 hours)      Manual Treatments     Row Name 08/08/19 1610             Total Minutes    08318 - OT Manual Therapy Minutes  45  -RE        User Key  (r) = Recorded By, (t) = Taken By, (c) = Cosigned By    Initials Name Provider Type    Poornima Torres OTR Occupational Therapist            Therapy Education  Education Details: Practiced donning  and doffing compression sleeves.  Used donning aides and gloves.  Patient was not able to perform I'ly despite several methods and types of products.  Will continue next visit.  Given: Edema management  Program: Progressed  How Provided: Verbal, Demonstration  Provided to: Patient  Level of Understanding: Teach back education performed, Verbalized, Demonstrated  35292 - OT Self Care/Mgmt Minutes: 45                Time Calculation:   OT Start Time: 1300  OT Stop Time: 1430  OT Time Calculation (min): 90 min  Total Timed Code Minutes- OT: 90 minute(s)     Therapy Charges for Today     Code Description Service Date Service Provider Modifiers Qty    93177367401 HC OT MANUAL THERAPY EA 15 MIN 8/8/2019 Poornima Middleton OTR GO 3    [id number] HC OT SELF CARE/MGMT/TRAIN EA 15 MIN 8/8/2019 Poornima Middleton OTR GO  3                      oPornima Middleton, OTR  8/8/2019

## 2019-08-09 ENCOUNTER — HOSPITAL ENCOUNTER (OUTPATIENT)
Dept: OCCUPATIONAL THERAPY | Facility: HOSPITAL | Age: 79
Discharge: HOME | End: 2019-08-09
Admitting: INTERNAL MEDICINE

## 2019-08-09 DIAGNOSIS — I97.2 POST-MASTECTOMY LYMPHEDEMA SYNDROME: Primary | ICD-10-CM

## 2019-08-09 PROCEDURE — 97535 SELF CARE MNGMENT TRAINING: CPT

## 2019-08-09 PROCEDURE — 97140 MANUAL THERAPY 1/> REGIONS: CPT

## 2019-08-09 NOTE — THERAPY TREATMENT NOTE
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   • Hyperglycemia   • Lymphedema of arm   • History of right breast cancer   • Renal mass, right   • Lymphadenopathy, abdominal   • Left displaced femoral neck fracture (CMS/HCC)   • Pneumonia   • Hypoxia   • Acute bronchiolitis due to human metapneumovirus        Past Medical History:   Diagnosis Date   • Arthritis    • Benign essential hypertension    • Breast cancer (CMS/HCC)     negative LN per patient- mastectomy and 5 years of tamoxifen   • Broken bones    • Chronic kidney disease     \"mass on Kidney\"  urologist says it was benign   • Coronary artery disease     MV - takes propranolol   • Disease of thyroid gland    • Hyperglycemia    • Lymphadenopathy    • MVP (mitral valve prolapse)    • Osteoporosis    • Pneumonia    • Seizures (CMS/HCC)    • Skin cancer         Past Surgical History:   Procedure Laterality Date   • BILATERAL OOPHORECTOMY     • BREAST SURGERY      Biopsy   • MASTECTOMY Right 1998   • SKIN BIOPSY     • SKIN LESION EXCISION     • TOTAL HIP ARTHROPLASTY Left 2019    Procedure: LEFT ANTERIOR TOTAL HIP ARTHROPLASTY;  Surgeon: Jak Hdz MD;  Location: [facility];  Service: Orthopedics         Visit Dx:      ICD-10-CM ICD-9-CM   1. 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       Lymphedema     Row Name 19 1500             Manual Lymphatic Drainage    Manual Lymphatic Drainage  --  -RE      Initial " Sequence  --  -RE         Compression/Skin Care    Compression/Skin Care  skin care;wrapping location;bandaging;remove bandages  -RE      Skin Care  washed/dried;lotion applied;topical anti-inflammatory applied  -RE      Wrapping Location  upper extremity  -RE      Wrapping Location UE  right:;hand to axilla  -RE      Bandage Layers  cotton liner;soft foam- 1/4 inch;short-stretch bandages (comment size/quantity)  -RE      Bandaging Comments  tG 7, 1-Rosidal soft, 1-6cm,1-8cm,  2-10cm Rosidal k.  -RE      Bandaging Technique  circumferential/spiral;light compression;moderate compression  -RE        User Key  (r) = Recorded By, (t) = Taken By, (c) = Cosigned By    Initials Name Provider Type    Poornima Torres OTR Occupational Therapist                  OT Assessment/Plan     Row Name 08/09/19 1603 08/08/19 1608       OT Assessment    Assessment Comments  ReadyWrap appears to be a better choice for home compression.  Sleeves  appear too difficult to judith adn patient wants to be able to do it herself.  She will be able to wear this product day and night which will decrease the overall cost.   -RE  Little change in measurements.  Patient having difficulty donning and doffing 30-40 mmHg garments due to decreased strength and coordination.    -RE       OT Plan    OT Plan Comments  next visit continue to address methods for long term maintanence and schedule follow-up to check maintanence with ReadyWrap.  -RE  continue to address donning and doffing garments tomorrow.  Will try lower comprssion classes or alternative products.   -RE      User Key  (r) = Recorded By, (t) = Taken By, (c) = Cosigned By    Initials Name Provider Type    Poornima Torres OTR Occupational Therapist                 Manual Rx (last 36 hours)      Manual Treatments     Row Name 08/09/19 1606 08/08/19 1610          Total Minutes    30674 - OT Manual Therapy Minutes  25  -RE  45  -RE       User Key  (r) = Recorded By, (t) = Taken By, (c) =  Cosigned By    Initials Name Provider Type    RE Poornima Middleton OTR Occupational Therapist            Therapy Education  Education Details: Practiced with [employer] and ReadyWrap.  Patient able to apply both I'ly.  Decided on the Ready wrap as it can be worn both at night and during the day.  Measured for products and gave patient ordering information.    Given: Edema management  Program: Progressed  How Provided: Verbal, Demonstration  Provided to: Patient  Level of Understanding: Teach back education performed, Verbalized, Demonstrated  38452 - OT Self Care/Mgmt Minutes: 60                Time Calculation:   OT Start Time: 1300  OT Stop Time: 1425  OT Time Calculation (min): 85 min  Total Timed Code Minutes- OT: 85 minute(s)     Therapy Charges for Today     Code Description Service Date Service Provider Modifiers Qty    08002576501  OT MANUAL THERAPY EA 15 MIN 8/9/2019 Poornima Middleton OTR GO 2    [id number]  OT SELF CARE/MGMT/TRAIN EA 15 MIN 8/9/2019 Poornima Middleton OTR GO 4                      SURY Mae  8/9/2019

## 2019-08-12 ENCOUNTER — TELEPHONE (OUTPATIENT)
Dept: FAMILY MEDICINE CLINIC | Facility: CLINIC | Age: 79
End: 2019-08-12

## 2019-08-12 RX ORDER — LEVOTHYROXINE SODIUM 0.07 MG/1
75 TABLET ORAL DAILY
Qty: 90 TABLET | Refills: 3 | Status: SHIPPED | OUTPATIENT
Start: 2019-08-12

## 2019-08-12 NOTE — TELEPHONE ENCOUNTER
SENT RX TO [facility] FOR PATIENT.       ----- Message from Lamar Clark sent at 8/12/2019 12:10 PM EDT -----  PT CALLED AND WANTS TO HAVE HER LEVOTHYROXINE 75MCG (1QD, QTY: 90) SCRIPT SENT TO [facility] ON [address] [facility] INSTEAD OF [facility].    PLEASE CALL PT BACK WITH ANY QUESTIONS -[phone]    THANK YOU

## 2019-08-13 ENCOUNTER — HOSPITAL ENCOUNTER (OUTPATIENT)
Dept: OCCUPATIONAL THERAPY | Facility: HOSPITAL | Age: 79
Setting detail: THERAPIES SERIES
Discharge: HOME | End: 2019-08-13

## 2019-08-13 DIAGNOSIS — I97.2 POST-MASTECTOMY LYMPHEDEMA SYNDROME: Primary | ICD-10-CM

## 2019-08-13 PROCEDURE — 97535 SELF CARE MNGMENT TRAINING: CPT

## 2019-08-13 PROCEDURE — 97140 MANUAL THERAPY 1/> REGIONS: CPT

## 2019-08-13 NOTE — THERAPY TREATMENT NOTE
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   • Hyperglycemia   • Lymphedema of arm   • History of right breast cancer   • Renal mass, right   • Lymphadenopathy, abdominal   • Left displaced femoral neck fracture (CMS/HCC)   • Pneumonia   • Hypoxia   • Acute bronchiolitis due to human metapneumovirus        Past Medical History:   Diagnosis Date   • Arthritis    • Benign essential hypertension    • Breast cancer (CMS/HCC)     negative LN per patient- mastectomy and 5 years of tamoxifen   • Broken bones    • Chronic kidney disease     \"mass on Kidney\"  urologist says it was benign   • Coronary artery disease     MV - takes propranolol   • Disease of thyroid gland    • Hyperglycemia    • Lymphadenopathy    • MVP (mitral valve prolapse)    • Osteoporosis    • Pneumonia    • Seizures (CMS/HCC)    • Skin cancer         Past Surgical History:   Procedure Laterality Date   • BILATERAL OOPHORECTOMY     • BREAST SURGERY      Biopsy   • MASTECTOMY Right 1998   • SKIN BIOPSY     • SKIN LESION EXCISION     • TOTAL HIP ARTHROPLASTY Left 2019    Procedure: LEFT ANTERIOR TOTAL HIP ARTHROPLASTY;  Surgeon: Jak Hdz MD;  Location: [facility];  Service: Orthopedics         Visit Dx:      ICD-10-CM ICD-9-CM   1. 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  yes  -RE      " Pre-Treatment Pain Level  0  -RE      Post-Treatment Pain Level  0  -RE         Subjective Comments    Subjective Comments  no new c/o  -RE         Manual Lymphatic Drainage    Manual Lymphatic Drainage  initial sequence;opened regional lymph nodes;opened anastamoses;extremity treatment  -RE      Initial Sequence  short neck  -RE      Opened Regional Lymph Nodes  axillary;inguinal  -RE      Axillary  left  -RE      Inguinal  right  -RE      Opened Anastamoses  axillo-inguinal  -RE      Axillo-Inguinal  right  -RE      Extremity Treatment  MLD to full limb  -RE         Compression/Skin Care    Compression/Skin Care  skin care;wrapping location;bandaging;remove bandages  -RE      Skin Care  washed/dried;lotion applied;topical anti-inflammatory applied  -RE      Wrapping Location  upper extremity  -RE      Wrapping Location UE  right:;hand to axilla  -RE      Bandage Layers  cotton liner;soft foam- 1/4 inch;short-stretch bandages (comment size/quantity)  -RE      Bandaging Comments  tG 7, 1-Rosidal soft, 1-6cm,1-8cm,  2-10cm Rosidal k.  -RE      Bandaging Technique  circumferential/spiral;light compression;moderate compression  -RE        User Key  (r) = Recorded By, (t) = Taken By, (c) = Cosigned By    Initials Name Provider Type    Poornima Torres OTR Occupational Therapist                  OT Assessment/Plan     Row Name 08/13/19 1504          OT Assessment    Assessment Comments  Still with edema around elbow but otherwise good reduction.  Ready to start maintanence phase.    -RE        OT Plan    OT Plan Comments  follow up in 2-3 weeks or sooner if problems with Ready Wrap  -RE       User Key  (r) = Recorded By, (t) = Taken By, (c) = Cosigned By    Initials Name Provider Type    Poornima Torres OTR Occupational Therapist                 Manual Rx (last 36 hours)      Manual Treatments     Row Name 08/13/19 1500             Total Minutes    26167 - OT Manual Therapy Minutes  60  -RE        User Key  (r) =  Recorded By, (t) = Taken By, (c) = Cosigned By    Initials Name Provider Type    RE Poornima Middleton OTR Occupational Therapist            Therapy Education  Education Details: reviewed use and care of Ready Wrap.  Given: Edema management  Program: Reinforced  How Provided: Verbal  Provided to: Patient  Level of Understanding: Teach back education performed, Verbalized  34380 - OT Self Care/Mgmt Minutes: 15                Time Calculation:   OT Start Time: 1300  OT Stop Time: 1415  OT Time Calculation (min): 75 min     Therapy Charges for Today     Code Description Service Date Service Provider Modifiers Qty    [id number]  OT MANUAL THERAPY EA 15 MIN 8/13/2019 Poornima Middleton OTR GO 4    [id number] HC OT SELF CARE/MGMT/TRAIN EA 15 MIN 8/13/2019 Poornima Middleton OTR GO 1                      SURY Mae  8/13/2019

## 2019-08-15 ENCOUNTER — APPOINTMENT (OUTPATIENT)
Dept: OCCUPATIONAL THERAPY | Facility: HOSPITAL | Age: 79
End: 2019-08-15

## 2019-08-16 ENCOUNTER — APPOINTMENT (OUTPATIENT)
Dept: OCCUPATIONAL THERAPY | Facility: HOSPITAL | Age: 79
End: 2019-08-16

## 2019-08-19 ENCOUNTER — HOSPITAL ENCOUNTER (OUTPATIENT)
Dept: CARDIOLOGY | Facility: HOSPITAL | Age: 79
Discharge: HOME | End: 2019-08-19
Admitting: INTERNAL MEDICINE

## 2019-08-19 VITALS
HEART RATE: 75 BPM | HEIGHT: 65 IN | BODY MASS INDEX: 24.66 KG/M2 | WEIGHT: 148 LBS | DIASTOLIC BLOOD PRESSURE: 70 MMHG | SYSTOLIC BLOOD PRESSURE: 130 MMHG

## 2019-08-19 DIAGNOSIS — R06.09 DOE (DYSPNEA ON EXERTION): ICD-10-CM

## 2019-08-19 DIAGNOSIS — I36.1 NON-RHEUMATIC TRICUSPID VALVE INSUFFICIENCY: ICD-10-CM

## 2019-08-19 DIAGNOSIS — I27.20 PULMONARY HTN (HCC): ICD-10-CM

## 2019-08-19 LAB
AORTIC ARCH: 2 CM
AORTIC ROOT ANNULUS: 1.8 CM
ASCENDING AORTA: 3 CM
BH CV ECHO MEAS - ACS: 1.8 CM
BH CV ECHO MEAS - AO MAX PG (FULL): 1.2 MMHG
BH CV ECHO MEAS - AO MAX PG: 7.1 MMHG
BH CV ECHO MEAS - AO MEAN PG (FULL): 0.26 MMHG
BH CV ECHO MEAS - AO MEAN PG: 4.1 MMHG
BH CV ECHO MEAS - AO V2 MAX: 132.9 CM/SEC
BH CV ECHO MEAS - AO V2 MEAN: 97.5 CM/SEC
BH CV ECHO MEAS - AO V2 VTI: 29.9 CM
BH CV ECHO MEAS - AVA(I,A): 1.9 CM^2
BH CV ECHO MEAS - AVA(I,D): 1.9 CM^2
BH CV ECHO MEAS - AVA(V,A): 1.8 CM^2
BH CV ECHO MEAS - AVA(V,D): 1.8 CM^2
BH CV ECHO MEAS - BSA(HAYCOCK): 1.8 M^2
BH CV ECHO MEAS - BSA: 1.7 M^2
BH CV ECHO MEAS - BZI_BMI: 24.6 KILOGRAMS/M^2
BH CV ECHO MEAS - BZI_METRIC_HEIGHT: 165.1 CM
BH CV ECHO MEAS - BZI_METRIC_WEIGHT: 67.1 KG
BH CV ECHO MEAS - EDV(MOD-SP2): 62 ML
BH CV ECHO MEAS - EDV(MOD-SP4): 80 ML
BH CV ECHO MEAS - EDV(TEICH): 181.5 ML
BH CV ECHO MEAS - EF(CUBED): 66.3 %
BH CV ECHO MEAS - EF(MOD-BP): 58 %
BH CV ECHO MEAS - EF(MOD-SP2): 59.7 %
BH CV ECHO MEAS - EF(MOD-SP4): 56.3 %
BH CV ECHO MEAS - EF(TEICH): 56.9 %
BH CV ECHO MEAS - ESV(MOD-SP2): 25 ML
BH CV ECHO MEAS - ESV(MOD-SP4): 35 ML
BH CV ECHO MEAS - ESV(TEICH): 78.2 ML
BH CV ECHO MEAS - FS: 30.4 %
BH CV ECHO MEAS - IVS/LVPW: 1.1
BH CV ECHO MEAS - IVSD: 1 CM
BH CV ECHO MEAS - LAT PEAK E' VEL: 8 CM/SEC
BH CV ECHO MEAS - LV DIASTOLIC VOL/BSA (35-75): 46 ML/M^2
BH CV ECHO MEAS - LV MASS(C)D: 242.6 GRAMS
BH CV ECHO MEAS - LV MASS(C)DI: 139.4 GRAMS/M^2
BH CV ECHO MEAS - LV MAX PG: 5.9 MMHG
BH CV ECHO MEAS - LV MEAN PG: 3.9 MMHG
BH CV ECHO MEAS - LV SYSTOLIC VOL/BSA (12-30): 20.1 ML/M^2
BH CV ECHO MEAS - LV V1 MAX: 121.3 CM/SEC
BH CV ECHO MEAS - LV V1 MEAN: 94.8 CM/SEC
BH CV ECHO MEAS - LV V1 VTI: 28.4 CM
BH CV ECHO MEAS - LVIDD: 6 CM
BH CV ECHO MEAS - LVIDS: 4.2 CM
BH CV ECHO MEAS - LVLD AP2: 6.6 CM
BH CV ECHO MEAS - LVLD AP4: 7.4 CM
BH CV ECHO MEAS - LVLS AP2: 6.3 CM
BH CV ECHO MEAS - LVLS AP4: 6.1 CM
BH CV ECHO MEAS - LVOT AREA (M): 2 CM^2
BH CV ECHO MEAS - LVOT AREA: 2 CM^2
BH CV ECHO MEAS - LVOT DIAM: 1.6 CM
BH CV ECHO MEAS - LVPWD: 0.93 CM
BH CV ECHO MEAS - MED PEAK E' VEL: 7 CM/SEC
BH CV ECHO MEAS - MR MAX PG: 119.6 MMHG
BH CV ECHO MEAS - MR MAX VEL: 546.8 CM/SEC
BH CV ECHO MEAS - MV A DUR: 0.13 SEC
BH CV ECHO MEAS - MV A MAX VEL: 115.9 CM/SEC
BH CV ECHO MEAS - MV DEC SLOPE: 396.6 CM/SEC^2
BH CV ECHO MEAS - MV DEC TIME: 0.23 SEC
BH CV ECHO MEAS - MV E MAX VEL: 92.6 CM/SEC
BH CV ECHO MEAS - MV E/A: 0.8
BH CV ECHO MEAS - MV MAX PG: 6.6 MMHG
BH CV ECHO MEAS - MV MEAN PG: 3.2 MMHG
BH CV ECHO MEAS - MV P1/2T MAX VEL: 93.6 CM/SEC
BH CV ECHO MEAS - MV P1/2T: 69.1 MSEC
BH CV ECHO MEAS - MV V2 MAX: 128.8 CM/SEC
BH CV ECHO MEAS - MV V2 MEAN: 86.3 CM/SEC
BH CV ECHO MEAS - MV V2 VTI: 36.5 CM
BH CV ECHO MEAS - MVA P1/2T LCG: 2.4 CM^2
BH CV ECHO MEAS - MVA(P1/2T): 3.2 CM^2
BH CV ECHO MEAS - MVA(VTI): 1.5 CM^2
BH CV ECHO MEAS - PA ACC TIME: 0.15 SEC
BH CV ECHO MEAS - PA MAX PG (FULL): 0.48 MMHG
BH CV ECHO MEAS - PA MAX PG: 2.7 MMHG
BH CV ECHO MEAS - PA PR(ACCEL): 10.9 MMHG
BH CV ECHO MEAS - PA V2 MAX: 81.4 CM/SEC
BH CV ECHO MEAS - PULM A REVS DUR: 0.13 SEC
BH CV ECHO MEAS - PULM A REVS VEL: 30.1 CM/SEC
BH CV ECHO MEAS - PULM DIAS VEL: 49 CM/SEC
BH CV ECHO MEAS - PULM S/D: 1.4
BH CV ECHO MEAS - PULM SYS VEL: 69.4 CM/SEC
BH CV ECHO MEAS - PVA(V,A): 3 CM^2
BH CV ECHO MEAS - PVA(V,D): 3 CM^2
BH CV ECHO MEAS - QP/QS: 1
BH CV ECHO MEAS - RAP SYSTOLE: 3 MMHG
BH CV ECHO MEAS - RV MAX PG: 2.2 MMHG
BH CV ECHO MEAS - RV MEAN PG: 1.2 MMHG
BH CV ECHO MEAS - RV V1 MAX: 73.7 CM/SEC
BH CV ECHO MEAS - RV V1 MEAN: 51.2 CM/SEC
BH CV ECHO MEAS - RV V1 VTI: 17.3 CM
BH CV ECHO MEAS - RVOT AREA: 3.3 CM^2
BH CV ECHO MEAS - RVOT DIAM: 2.1 CM
BH CV ECHO MEAS - RVSP: 48 MMHG
BH CV ECHO MEAS - SI(CUBED): 83.2 ML/M^2
BH CV ECHO MEAS - SI(LVOT): 32.3 ML/M^2
BH CV ECHO MEAS - SI(MOD-SP2): 21.3 ML/M^2
BH CV ECHO MEAS - SI(MOD-SP4): 25.9 ML/M^2
BH CV ECHO MEAS - SI(TEICH): 59.4 ML/M^2
BH CV ECHO MEAS - SUP REN AO DIAM: 2 CM
BH CV ECHO MEAS - SV(CUBED): 144.7 ML
BH CV ECHO MEAS - SV(LVOT): 56.3 ML
BH CV ECHO MEAS - SV(MOD-SP2): 37 ML
BH CV ECHO MEAS - SV(MOD-SP4): 45 ML
BH CV ECHO MEAS - SV(RVOT): 57.6 ML
BH CV ECHO MEAS - SV(TEICH): 103.3 ML
BH CV ECHO MEAS - TAPSE (>1.6): 2.4 CM2
BH CV ECHO MEAS - TR MAX VEL: 337.2 CM/SEC
BH CV ECHO MEASUREMENTS AVERAGE E/E' RATIO: 12.35
BH CV XLRA - RV BASE: 3.8 CM
BH CV XLRA - TDI S': 14 CM/SEC
LEFT ATRIUM VOLUME INDEX: 45 ML/M2
SINUS: 2.8 CM
STJ: 2.2 CM

## 2019-08-19 PROCEDURE — 93306 TTE W/DOPPLER COMPLETE: CPT | Performed by: INTERNAL MEDICINE

## 2019-08-19 PROCEDURE — 93306 TTE W/DOPPLER COMPLETE: CPT

## 2019-08-20 ENCOUNTER — TELEPHONE (OUTPATIENT)
Dept: CARDIOLOGY | Facility: CLINIC | Age: 79
End: 2019-08-20

## 2019-08-20 ENCOUNTER — APPOINTMENT (OUTPATIENT)
Dept: OCCUPATIONAL THERAPY | Facility: HOSPITAL | Age: 79
End: 2019-08-20

## 2019-08-20 NOTE — TELEPHONE ENCOUNTER
"Pt called in regards of her test results that she had yesterday.      Assessment/Plan         1.  Fall and hip fracture.  Postop day 1 left hip arthroplasty.  2.  Bilateral pneumonia  3.  History of hypertension.     I reviewed her echocardiogram.  Moderate severe tricuspid regurgitation and severe pulmonary hypertension is new and likely related to pneumonia.  I will plan on having her come back to the office for a repeat echo in 3 months.     Tessa Prince MD, [facility]  05/13/19  8:33 AM      Interpretation Summary     · Calculated EF = 58%.  · Left ventricular systolic function is normal.  · Left ventricular diastolic dysfunction (grade I) consistent with impaired relaxation.  · Left atrial cavity size is moderately dilated.  · Mild-to-moderate mitral valve regurgitation is present  · Mild to moderate tricuspid valve regurgitation is present.  · Estimated right ventricular systolic pressure from tricuspid regurgitation is moderately elevated (45-55 mmHg).  · Calculated right ventricular systolic pressure from tricuspid regurgitation is 48 mmHg.      Patient Hx Of Height, Weight, and Vitals     Height Weight BSA (Calculated - sq m) BMI (kg/m2) Pulse BP   165.1 cm (65\") 67.1 kg (148 lb) 1.74 sq meters 24.68 75 130/70   Reason For Exam     Valvular Function   Dx: Non-rheumatic tricuspid valve insufficiency [I36.1 (ICD-10-CM)]; Pulmonary HTN (CMS/HCC) [I27.20 (ICD-10-CM)]; IRAHETA (dyspnea on exertion) [R06.09 (ICD-10-CM)]   Cardiac History     Diagnosis Date Comment Source   Benign essential hypertension   Provider   Coronary artery disease  MV - takes propranolol Provider   Study Description     A two-dimensional transthoracic echocardiogram with color flow and Doppler was performed. The study is technically good for diagnosis.   Echocardiogram Findings     Left Ventricle Left ventricular systolic function is normal. Calculated EF = 58%. Estimated EF was in agreement with the calculated EF. " Normal left ventricular cavity size and wall thickness noted. All left ventricular wall segments contract normally. Left ventricular diastolic dysfunction is noted (grade I) consistent with impaired relaxation.   Right Ventricle Normal right ventricular cavity size, wall thickness, systolic function and septal motion noted.   Left Atrium Left atrial cavity size is moderately dilated. Left atrial volume is moderately increased.   Right Atrium Right atrial cavity size is mild-to-moderately dilated.   Aortic Valve The aortic valve is structurally normal. Trace aortic valve regurgitation is present. No aortic valve stenosis is present.   Mitral Valve The mitral valve is abnormal in structure. Mild MAC is present. Mild-to-moderate mitral valve regurgitation is present with an eccentric jet noted. No significant mitral valve stenosis is present.   Tricuspid Valve The tricuspid valve is normal. Mild to moderate tricuspid valve regurgitation is present. Estimated right ventricular systolic pressure from tricuspid regurgitation is moderately elevated (45-55 mmHg). Calculated right ventricular systolic pressure from tricuspid regurgitation is 48 mmHg.   Pulmonic Valve The pulmonic valve is structurally normal. There is no significant pulmonic valve stenosis present. There is no pulmonic valve regurgitation present.   Greater Vessels No dilation of the aortic root is present. The inferior vena cava is normally sized. Normal IVC inspiratory collapse of greater than 50% noted.   Pericardium The pericardium is normal. There is no evidence of pericardial effusion.      Wall Scoring     Score Index: 1.00              The left ventricular wall motion is normal.               LV Measurements     Dimensions   LVIDd 6 cm      IVSd 1 cm      FS 30.4 %      LVOT area 2 cm^2      LVOT diam 1.6 cm      EDV(MOD-sp2) 62 ml      ESV(MOD-sp2) 25 ml      Diastolic Filling   MV E max marivel 92.6 cm/sec      MV A max marivel 115.9 cm/sec      MV dec  time 0.23 sec      MV E/A 0.8       Pulm A Revs Dur 0.13 sec      LA Volume Index 45 mL/m2      Med Peak E' Alejandro 7 cm/sec      Lat Peak E' Alejandro 8 cm/sec      Avg E/e' ratio 12.35       Shunt Ratio   SV(LVOT) 56.3 ml      SV(RVOT) 57.6 ml      Qp/Qs 1        Dimensions   LVIDs 4.2 cm      LVPWd 0.93 cm      IVS/LVPW 1.1       LV Sys Vol (BSA corrected) 20.1 ml/m^2      LV Blackwell Vol (BSA corrected) 46 ml/m^2      LV mass(C)d 242.6 grams      EDV(MOD-sp4) 80 ml      ESV(MOD-sp4) 35 ml      Systolic Function   SV(MOD-sp2) 37 ml      SV(MOD-sp4) 45 ml      SI(MOD-sp2) 21.3 ml/m^2      SI(MOD-sp4) 25.9 ml/m^2      EF(MOD-sp2) 59.7 %      EF(MOD-sp4) 56.3 %      EF(MOD-bp) 58 %         Right Ventricle Measurements     TAPSE (>1.6) 2.4 cm2      TDI S' 14 cm/sec      RV Base 3.8 cm         LA Measurements     LA Dimensions   LA Volume Index 45 mL/m2      Pulmonary Veins   Pulm Sys Alejandro 69.4 cm/sec      Pulm Blackwell Alejandro 49 cm/sec      Pulm S/D 1.4       Pulm A Revs Alejandro 30.1 cm/sec      Pulm A Revs Dur 0.13 sec         Aortic Valve Measurements     Stenosis   LVOT diam 1.6 cm      LV V1 max 121.3 cm/sec      LV V1 max PG 5.9 mmHg      LV V1 mean PG 3.9 mmHg      LV V1 VTI 28.4 cm      Ao pk alejandro 132.9 cm/sec       Stenosis   Ao max PG 7.1 mmHg      Ao mean PG 4.1 mmHg      Ao V2 VTI 29.9 cm      JANICE(I,D) 1.9 cm^2         Mitral Valve Measurements     Stenosis   MV max PG 6.6 mmHg      MV mean PG 3.2 mmHg      MV V2 VTI 36.5 cm      MV P1/2t 69.1 msec      MVA(P1/2t) 3.2 cm^2      MVA(VTI) 1.5 cm^2      MV dec slope 396.6 cm/sec^2      MV dec time 0.23 sec       Regurgitation   MR max alejandro 546.8 cm/sec      MR max .6 mmHg         Tricuspid Valve Measurements     Regurgitation   TR max alejandro 337.2 cm/sec      RVSP(TR) 48 mmHg      RAP systole 3 mmHg      PISA   TR max alejandro 337.2 cm/sec          Pulmonic Valve Measurements     Stenosis   RVOT diam 2.1 cm      RV V1 max PG 2.2 mmHg      RV V1 max 73.7 cm/sec      RV V1 VTI 17.3 cm       PA V2 max 81.4 cm/sec      PA acc time 0.15 sec       Regurgitation   PA pr(Accel) 10.9 mmHg         Greater Vessels Measurements     Ao root annulus 1.8 cm      Abdo Ao Diam 2 cm      ACS 1.8 cm      Sinus 2.8 cm      STJ 2.2 cm      Ascending aorta 3 cm      Aortic arch 2 cm

## 2019-08-20 NOTE — TELEPHONE ENCOUNTER
I reviewed her echo from August 19.  I do not see any significant change from her echo in May 2019.  I do not see that she has a follow-up with me or nurse practitioner.  Probably needs to come in for follow-up to go over what our options are going forward.

## 2019-08-21 NOTE — TELEPHONE ENCOUNTER
Called pt. Notified her of echo results. Scheduled her for a follow up with LIZBETH next week. Pt verbalized understanding.    Cari Chen, RN  Triage RN KARLY

## 2019-08-22 ENCOUNTER — APPOINTMENT (OUTPATIENT)
Dept: OCCUPATIONAL THERAPY | Facility: HOSPITAL | Age: 79
End: 2019-08-22

## 2019-08-22 RX ORDER — LEVETIRACETAM 500 MG/1
TABLET ORAL
Qty: 180 TABLET | Refills: 1 | Status: SHIPPED | OUTPATIENT
Start: 2019-08-22 | End: 2020-02-12 | Stop reason: SDUPTHER

## 2019-08-22 NOTE — TELEPHONE ENCOUNTER
Need to discuss with Dr. Oliveira whether family doctor can assume refills for this prescription since we are not surgically managing the patient.

## 2019-08-23 ENCOUNTER — APPOINTMENT (OUTPATIENT)
Dept: OCCUPATIONAL THERAPY | Facility: HOSPITAL | Age: 79
End: 2019-08-23

## 2019-08-23 RX ORDER — PAROXETINE 10 MG/1
10 TABLET, FILM COATED ORAL EVERY MORNING
Qty: 30 TABLET | Refills: 5 | Status: SHIPPED | OUTPATIENT
Start: 2019-08-23

## 2019-08-23 RX ORDER — ATORVASTATIN CALCIUM 20 MG/1
TABLET, FILM COATED ORAL
Qty: 30 TABLET | Refills: 5 | Status: SHIPPED | OUTPATIENT
Start: 2019-08-23

## 2019-08-27 ENCOUNTER — APPOINTMENT (OUTPATIENT)
Dept: OCCUPATIONAL THERAPY | Facility: HOSPITAL | Age: 79
End: 2019-08-27

## 2019-08-29 ENCOUNTER — OFFICE VISIT (OUTPATIENT)
Dept: CARDIOLOGY | Facility: CLINIC | Age: 79
End: 2019-08-29

## 2019-08-29 ENCOUNTER — APPOINTMENT (OUTPATIENT)
Dept: OCCUPATIONAL THERAPY | Facility: HOSPITAL | Age: 79
End: 2019-08-29

## 2019-08-29 VITALS
HEIGHT: 65 IN | HEART RATE: 85 BPM | SYSTOLIC BLOOD PRESSURE: 98 MMHG | WEIGHT: 145.6 LBS | BODY MASS INDEX: 24.26 KG/M2 | DIASTOLIC BLOOD PRESSURE: 60 MMHG

## 2019-08-29 DIAGNOSIS — I10 BENIGN ESSENTIAL HYPERTENSION: ICD-10-CM

## 2019-08-29 DIAGNOSIS — I34.0 MITRAL VALVE INSUFFICIENCY, UNSPECIFIED ETIOLOGY: ICD-10-CM

## 2019-08-29 DIAGNOSIS — Z87.01 HISTORY OF PNEUMONIA: ICD-10-CM

## 2019-08-29 DIAGNOSIS — Z85.3 HISTORY OF RIGHT BREAST CANCER: ICD-10-CM

## 2019-08-29 DIAGNOSIS — I07.1 TRICUSPID VALVE INSUFFICIENCY, UNSPECIFIED ETIOLOGY: ICD-10-CM

## 2019-08-29 DIAGNOSIS — R06.09 DOE (DYSPNEA ON EXERTION): ICD-10-CM

## 2019-08-29 DIAGNOSIS — I27.20 PULMONARY HYPERTENSION (HCC): Primary | ICD-10-CM

## 2019-08-29 PROCEDURE — 93000 ELECTROCARDIOGRAM COMPLETE: CPT | Performed by: NURSE PRACTITIONER

## 2019-08-29 PROCEDURE — 99214 OFFICE O/P EST MOD 30 MIN: CPT | Performed by: NURSE PRACTITIONER

## 2019-08-29 NOTE — PROGRESS NOTES
Date of Office Visit: 2019  Encounter Provider: Lottie Garvin, FERNANDO, APRN  Place of Service: [facility] CARDIOLOGY  Patient Name: Karolina Ornelas  :1940        Subjective:     Chief Complaint:  Follow-up, pulmonary hypertension, hypertension.      History of Present Illness:  Karolina Ornelas is a 79 y.o. female patient of Dr. Prince.  This is my first time seeing this patient in the office today and I reviewed her records.    Patient has a history of pulmonary hypertension, hypertension, breast cancer status post mastectomy, seizures, left hip fracture.    Patient presented to the ER 5/10/2019 after slipping and falling and complaining of left hip pain.  She was found to have a left hip fracture.  CT chest 2019 for shortness of breath and cough showed bilateral pneumonia.  O2 sats were in the 80s upon arrival to the ER.  EKG showed normal sinus rhythm with PACs.  Potassium level of 2.7.  CPK of 722.  She was given antibiotics and Ortho was consulted.  Potassium was replaced.  Ortho recommended surgery.  Cardiology was consulted for preoperative clearance.  Pulmonology was consulted for pneumonia.  Patient was noted to have had an echocardiogram done 2018 showing normal left ventricular systolic function with EF of 56%, grade 1 diastolic dysfunction, mild concentric LVH, mild thickening of the aortic valve, mild mitral annular calcification with trace mitral regurgitation, and mild tricuspid regurgitation with normal RVSP.  She reported a history of mitral valve prolapse with this was not seen on echocardiogram.  Repeat echo was ordered.  Patient had echocardiogram done 2019 showing normal left ventricular systolic function with EF of 60%, left ventricular hypertrophy with sigmoid shaped septum was seen, mildly dilated right ventricular cavity, mild mitral regurgitation, moderate to severe tricuspid regurgitation with RVSP of 64.3 mmHg.  This was in the setting of  pneumonia.  Follow-up echocardiogram was recommended in 3 months.  Patient had echocardiogram done 8/19/2019 showing normal left ventricular systolic function with EF of 58%, grade 1 diastolic dysfunction, moderately dilated left atrial cavity, mild to moderate mitral regurgitation, mild to moderate tricuspid regurgitation with moderately elevated RVSP of 48 mmHg.      Patient presents to office today for follow-up appointment.  Patient reports she has been doing okay overall since hospital discharge.  She is still feeling fatigued since her hospitalization but feels that this is improving.  She did physical therapy for her hip.  She continues to use a walker.  She reports that she had a chest x-ray through her primary care provider couple of weeks ago that was clear.  She reports that she does have some mild shortness of breath with heavier exertion.  She does not recall having this prior to the pneumonia.  She really only notices it while doing PT exercises.  She thinks it is been about the same since hospital discharge.  She reports that she has no longer wheezing.  She has some intermittent lower extremity swelling however none present on exam today.  Denies any chest pain, palpitations, racing heartbeat sensation, dizziness, syncope, near syncope, or abnormal bleeding.  She denies any falls since hospital discharge.  She reports that she had a stress test years ago and she thinks the last one was in 2006 and that it was normal.  She declines any repeat stress testing at this time.  She does not think that she has a history of snoring and reports that she feels relatively well rested in the morning.  She is open to seeing pulmonology for further evaluation of pulmonary hypertension however at this point she is not wanting to consider heart catheterization for further evaluation.  Blood pressure is a little low in the office today however she thinks that this is a fluke as she reports it runs about 120-130  "outside the office.  She will continue to monitor and call if she has systolic blood pressure readings less than 100.          Past Medical History:   Diagnosis Date   • Acquired hypothyroidism    • Arthritis    • Benign essential hypertension    • Breast cancer (CMS/HCC) 1998    negative LN per patient- mastectomy and 5 years of tamoxifen   • Broken bones    • Chronic kidney disease     \"mass on Kidney\"  urologist says it was benign   • Coronary artery disease     MV - takes propranolol   • Disease of thyroid gland    • Hyperglycemia    • Hypoxia    • Lymphadenopathy    • MVP (mitral valve prolapse)    • Osteoporosis    • Pneumonia    • Seizures (CMS/HCC) 2018   • Skin cancer 2007     Past Surgical History:   Procedure Laterality Date   • BILATERAL OOPHORECTOMY  1999   • BREAST SURGERY  1998    Biopsy   • MASTECTOMY Right 07/1998   • SKIN BIOPSY     • SKIN LESION EXCISION  2007   • TOTAL HIP ARTHROPLASTY Left 5/12/2019    Procedure: LEFT ANTERIOR TOTAL HIP ARTHROPLASTY;  Surgeon: Jak Hdz MD;  Location: [facility];  Service: Orthopedics     Outpatient Medications Prior to Visit   Medication Sig Dispense Refill   • acetaminophen (TYLENOL) 325 MG tablet Take 2 tablets by mouth Every 6 (Six) Hours As Needed for Mild Pain  (pain or symptomatic fever).     • alendronate (FOSAMAX) 70 MG tablet TAKE 1 TABLET EVERY 7 DAYS 12 tablet 3   • atorvastatin (LIPITOR) 20 MG tablet TAKE 1 TABLET BY MOUTH EVERY DAY 30 tablet 5   • Calcium Carbonate (CALTRATE 600 PO) Take  by mouth Daily.     • Cholecalciferol (VITAMIN D PO) Take 1,000 Units by mouth Daily.     • fluticasone (FLONASE) 50 MCG/ACT nasal spray 2 sprays by Each Nare route Daily.     • furosemide (LASIX) 40 MG tablet TAKE 1 TABLET BY MOUTH EVERY DAY 90 tablet 0   • guaiFENesin (MUCINEX) 600 MG 12 hr tablet Take 1 tablet by mouth 2 (Two) Times a Day.     • levETIRAcetam (KEPPRA) 500 MG tablet TAKE 1 TABLET BY MOUTH TWICE DAILY 180 tablet 1   • " levothyroxine (SYNTHROID, LEVOTHROID) 75 MCG tablet Take 1 tablet by mouth Daily. 90 tablet 3   • lisinopril (PRINIVIL,ZESTRIL) 20 MG tablet TAKE 1 TABLET BY MOUTH EVERY DAY 90 tablet 0   • PARoxetine (PAXIL) 10 MG tablet Take 1 tablet by mouth Every Morning. 30 tablet 5   • potassium chloride (K-DUR,KLOR-CON) 20 MEQ CR tablet TAKE 1 TABLET BY MOUTH EVERY DAY 90 tablet 0   • propranolol (INDERAL) 40 MG tablet TAKE 1 TABLET TWICE A  tablet 3   • potassium chloride (K-DUR,KLOR-CON) 20 MEQ CR tablet Take 1 tablet by mouth Daily.       No facility-administered medications prior to visit.        Allergies as of 08/29/2019 - Reviewed 08/29/2019   Allergen Reaction Noted   • Shellfish-derived products Swelling 11/08/2018   • Cefaclor Unknown (See Comments) 01/26/2016     Social History     Socioeconomic History   • Marital status: Single     Spouse name: Not on file   • Number of children: Not on file   • Years of education: College   • Highest education level: Not on file   Occupational History   • Occupation: Teacher     Employer: RETIRED   Tobacco Use   • Smoking status: Never Smoker   • Smokeless tobacco: Never Used   Substance and Sexual Activity   • Alcohol use: No   • Drug use: No   • Sexual activity: Defer     Family History   Problem Relation Age of Onset   • Cancer Mother    • Cancer Maternal Aunt    • Stroke Father    • Stroke Sister        Review of Systems   Constitution: Negative for chills, fever, malaise/fatigue, weight gain and weight loss.   HENT: Negative for ear pain, hearing loss, nosebleeds and sore throat.    Eyes: Negative for blurred vision, double vision, redness, vision loss in left eye and vision loss in right eye.   Cardiovascular: Positive for leg swelling.        SEE HPI.    Respiratory: Positive for shortness of breath. Negative for cough, snoring and wheezing.    Endocrine: Negative for cold intolerance and heat intolerance.   Skin: Negative for itching, rash and suspicious lesions.  "  Musculoskeletal: Negative for joint pain, joint swelling and myalgias.   Gastrointestinal: Negative for abdominal pain, diarrhea, hematemesis, melena, nausea and vomiting.   Genitourinary: Negative for dysuria, frequency and hematuria.   Neurological: Positive for seizures. Negative for dizziness, headaches, numbness and paresthesias.   Psychiatric/Behavioral: Negative for altered mental status and depression. The patient is not nervous/anxious.           Objective:     Vitals:    08/29/19 1354   BP: 98/60   BP Location: Left arm   Pulse: 85   Weight: 66 kg (145 lb 9.6 oz)   Height: 165.1 cm (65\")     Body mass index is 24.23 kg/m².      PHYSICAL EXAM:  Physical Exam   Constitutional: She is oriented to person, place, and time. She appears well-developed and well-nourished. No distress.   HENT:   Head: Normocephalic and atraumatic.   Eyes: Pupils are equal, round, and reactive to light.   Neck: Neck supple. No JVD present. Carotid bruit is not present. No tracheal deviation present.   Cardiovascular: Normal rate, regular rhythm, normal heart sounds and intact distal pulses. Exam reveals no gallop and no friction rub.   No murmur heard.  Pulses:       Radial pulses are 2+ on the right side, and 2+ on the left side.        Posterior tibial pulses are 2+ on the right side, and 2+ on the left side.   Pulmonary/Chest: Effort normal and breath sounds normal. No respiratory distress. She has no wheezes. She has no rales.   Abdominal: Soft. Bowel sounds are normal. She exhibits no distension. There is no tenderness.   Musculoskeletal: She exhibits no edema, tenderness or deformity.   Walker   Neurological: She is alert and oriented to person, place, and time.   Skin: Skin is warm and dry. No rash noted. She is not diaphoretic. No erythema.   Right wrist splint in place   Psychiatric: She has a normal mood and affect. Her behavior is normal. Judgment normal.           ECG 12 Lead  Date/Time: 8/29/2019 2:13 PM  Performed " by: Lottie Garvin DNP, APRN  Authorized by: Lottie Garvin DNP, APRN   Comparison: compared with previous ECG from 5/10/2019  Rhythm: sinus rhythm  Rate: normal  BPM: 85    Clinical impression: non-specific ECG              Assessment:       Diagnosis Plan   1. Pulmonary hypertension (CMS/HCC)  ECG 12 Lead    Ambulatory Referral to Pulmonology   2. Benign essential hypertension  ECG 12 Lead   3. History of pneumonia  Ambulatory Referral to Pulmonology   4. History of right breast cancer     5. Mitral valve insufficiency, unspecified etiology     6. Tricuspid valve insufficiency, unspecified etiology     7. IRAHETA (dyspnea on exertion)  ECG 12 Lead    Ambulatory Referral to Pulmonology         Plan:     1. Pulmonary hypertension: RVSP 64.3 mmHg 5/2019 in setting of bilateral pneumonia.  Improved slightly to 48 mmHg on 8/19/19 echo.  Patient does have some mild shortness of breath with heavier exertion.  She does not recall having this prior to her hospitalization for pneumonia.  She does not have any significant sleep apnea symptoms.  She denies any chest discomfort and declines any stress testing.  She reports blood pressure stays around 120-130 outside of the office.  She is open to seeing pulmonology for evaluation for pulmonary component to pulmonary hypertension however she does not want to consider heart catheterization at this time.  Her David who she saw in the hospital.  2. Hypertension: Blood pressure little low in the office today however she reports it runs 120s to 130s systolic outside of the office.  She will continue to monitor and call for any systolic readings less than 100.  3. Mitral regurgitation: Mild to moderate on 8/19/2019 echo.  She is on beta-blocker therapy with propranolol.  4. Tricuspid regurgitation: Mild to moderate on 8/19/2019 echocardiogram.  5. History of bilateral pneumonia  6. History of fall with hip fracture    Patient to follow-up with Dr. Prince in 6 months or sooner if  needed for any new, recurrent, or worsening symptoms or other problems/concerns.           Your medication list           Accurate as of 8/29/19  4:24 PM. If you have any questions, ask your nurse or doctor.               CONTINUE taking these medications      Instructions Last Dose Given Next Dose Due   acetaminophen 325 MG tablet  Commonly known as:  TYLENOL      Take 2 tablets by mouth Every 6 (Six) Hours As Needed for Mild Pain  (pain or symptomatic fever).       alendronate 70 MG tablet  Commonly known as:  FOSAMAX      TAKE 1 TABLET EVERY 7 DAYS       atorvastatin 20 MG tablet  Commonly known as:  LIPITOR      TAKE 1 TABLET BY MOUTH EVERY DAY       CALTRATE 600 PO      Take  by mouth Daily.       fluticasone 50 MCG/ACT nasal spray  Commonly known as:  FLONASE      2 sprays by Each Nare route Daily.       furosemide 40 MG tablet  Commonly known as:  LASIX      TAKE 1 TABLET BY MOUTH EVERY DAY       guaiFENesin 600 MG 12 hr tablet  Commonly known as:  MUCINEX      Take 1 tablet by mouth 2 (Two) Times a Day.       levETIRAcetam 500 MG tablet  Commonly known as:  KEPPRA      TAKE 1 TABLET BY MOUTH TWICE DAILY       levothyroxine 75 MCG tablet  Commonly known as:  SYNTHROID, LEVOTHROID      Take 1 tablet by mouth Daily.       lisinopril 20 MG tablet  Commonly known as:  PRINIVIL,ZESTRIL      TAKE 1 TABLET BY MOUTH EVERY DAY       PARoxetine 10 MG tablet  Commonly known as:  PAXIL      Take 1 tablet by mouth Every Morning.       potassium chloride 20 MEQ CR tablet  Commonly known as:  K-DUR,KLOR-CON      Take 1 tablet by mouth Daily.       potassium chloride 20 MEQ CR tablet  Commonly known as:  K-DUR,KLOR-CON      TAKE 1 TABLET BY MOUTH EVERY DAY       propranolol 40 MG tablet  Commonly known as:  INDERAL      TAKE 1 TABLET TWICE A DAY       VITAMIN D PO      Take 1,000 Units by mouth Daily.              I did not stop or change the above medications.  Patient's medication list was updated to reflect medications  they are currently taking including medication changes made by other providers.        Thanks,    Lottie Garvin, FERNANDO, APRN  08/29/2019         Dictated utilizing Dragon dictation

## 2019-08-30 ENCOUNTER — APPOINTMENT (OUTPATIENT)
Dept: OCCUPATIONAL THERAPY | Facility: HOSPITAL | Age: 79
End: 2019-08-30

## 2019-09-03 ENCOUNTER — HOSPITAL ENCOUNTER (OUTPATIENT)
Dept: OCCUPATIONAL THERAPY | Facility: HOSPITAL | Age: 79
Setting detail: THERAPIES SERIES
Discharge: HOME | End: 2019-09-03

## 2019-09-03 ENCOUNTER — APPOINTMENT (OUTPATIENT)
Dept: OCCUPATIONAL THERAPY | Facility: HOSPITAL | Age: 79
End: 2019-09-03

## 2019-09-03 DIAGNOSIS — I97.2 POST-MASTECTOMY LYMPHEDEMA SYNDROME: Primary | ICD-10-CM

## 2019-09-03 PROCEDURE — 97535 SELF CARE MNGMENT TRAINING: CPT

## 2019-09-03 NOTE — THERAPY PROGRESS REPORT/RE-CERT
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   • Hyperglycemia   • Lymphedema of arm   • History of right breast cancer   • Renal mass, right   • Lymphadenopathy, abdominal   • Left displaced femoral neck fracture (CMS/HCC)   • Pneumonia   • Hypoxia   • Acute bronchiolitis due to human metapneumovirus   • Pulmonary hypertension (CMS/HCC)   • History of pneumonia   • Mitral regurgitation   • Tricuspid regurgitation        Past Medical History:   Diagnosis Date   • Acquired hypothyroidism    • Arthritis    • Benign essential hypertension    • Breast cancer (CMS/HCC)     negative LN per patient- mastectomy and 5 years of tamoxifen   • Broken bones    • Chronic kidney disease     \"mass on Kidney\"  urologist says it was benign   • Coronary artery disease     MV - takes propranolol   • Disease of thyroid gland    • Hyperglycemia    • Hypoxia    • Lymphadenopathy    • MVP (mitral valve prolapse)    • Osteoporosis    • Pneumonia    • Seizures (CMS/HCC)    • Skin cancer         Past Surgical History:   Procedure Laterality Date   • BILATERAL OOPHORECTOMY     • BREAST SURGERY      Biopsy   • MASTECTOMY Right 1998   • SKIN BIOPSY     • SKIN LESION EXCISION     • TOTAL HIP ARTHROPLASTY Left 2019    Procedure: LEFT ANTERIOR TOTAL HIP ARTHROPLASTY;  Surgeon: Jak Hdz MD;  Location: [facility] OR;  Service: Orthopedics         Visit Dx:      ICD-10-CM ICD-9-CM   1. Post-mastectomy " lymphedema syndrome I97.2 457.0       Lymphedema     Row Name 09/03/19 1700             Subjective Pain    Able to rate subjective pain?  yes  -RE      Pre-Treatment Pain Level  0  -RE        User Key  (r) = Recorded By, (t) = Taken By, (c) = Cosigned By    Initials Name Provider Type    Poornima Torres OTR Occupational Therapist                  OT Assessment/Plan     Row Name 09/03/19 1721          OT Assessment    Functional Limitations  Performance in self-care ADL  -RE     Impairments  Edema;Impaired lymphatic circulation  -RE     Assessment Comments  Maintaining fairly well with use of Ready Wrap but she was apply the glove in the wrong position(over the wrist strap.  I demonstrated the proper technique and reviewed with her.  I think this may alos solve the slipping issue.   -RE     OT Diagnosis  post mastectomy lymphedema  -RE     OT Rehab Potential  Good  -RE     Patient/caregiver participated in establishment of treatment plan and goals  Yes  -RE     Patient would benefit from skilled therapy intervention  Yes  -RE        OT Plan    OT Frequency  Other (comment)  -RE     Predicted Duration of Therapy Intervention (Therapy Eval)  PRN for issues with maintanence  -RE     Planned CPT's?  OT THER ACT EA 15 MIN: [id number];OT THER PROC EA 15 MIN: [id number];OT SELF CARE/MGMT/TRAIN 15 MIN: 18115;OT MANUAL THERAPY EA 15 MIN: 67331  -RE     Planned Therapy Interventions (Optional Details)  home exercise program;manual therapy techniques;patient/family education;other (see comments) compression and skin care  -RE     OT Plan Comments  See as needed for any issues with compression  -RE       User Key  (r) = Recorded By, (t) = Taken By, (c) = Cosigned By    Initials Name Provider Type    Poornima Torres OTR Occupational Therapist                OT Goals     Row Name 09/03/19 1700          OT Short Term Goals    STG Date to Achieve  09/17/19  -RE     STG 1  Patient will demonstrate proper awareness of “What is  Lymphedema?” and “Healthy Habits” for improved prevention, management, care of symptoms and ease of transition to self-care of condition.   -RE     STG 1 Progress  Met  -RE     STG 2  Patient independent and compliant with self-wrapping techniques of compression bandages with family member as indicated for improved self-management of condition.  -RE     STG 2 Progress  Met  -RE     STG 3  Patient will demonstrate decreased net edema of right upper extremity >/= 10-20 cm for decrease in symptoms, decreased risk of infection and improved skin care/transition to self-care of condition.   -RE     STG 3 Progress  Met  -RE        Long Term Goals    LTG Date to Achieve  10/01/19  -RE     LTG 1  Patient will demonstrate decreased net edema of right upper extremity >/= 21-30 cm for decrease in symptoms, decreased risk of infection and improved skin care/transition to self-care of condition.   -RE     LTG 1 Progress  Met  -RE     LTG 2  Patient independent and compliant with initial home exercise program focused on diaphragmatic breathing, range of motion, flexibility to decrease edema and improve lymphatic flow for decreased edema and decreased risk of infection.   -RE     LTG 2 Progress  Met  -RE     LTG 3  Patient independent and compliant with compression garments and night compression as indicated for self-management of edema.   -RE     LTG 3 Progress  Met  -RE        Time Calculation    OT Goal Re-Cert Due Date  10/25/19  -RE       User Key  (r) = Recorded By, (t) = Taken By, (c) = Cosigned By    Initials Name Provider Type    Poornima Torres OTR Occupational Therapist          Therapy Education  Education Details: reviewed current use of ReadyWrap.  She is having some issues with the product slipping.  instructed her to Rk the glove under the wrist strap.   Given: Bandaging/dressing change, Edema management  Program: New  How Provided: Verbal, Demonstration  Provided to: Patient, Caregiver  Level of Understanding:  Teach back education performed, Verbalized, Demonstrated  47103 - OT Self Care/Mgmt Minutes: 30                Time Calculation:   OT Start Time: 1515  OT Stop Time: 1545  OT Time Calculation (min): 30 min  Total Timed Code Minutes- OT: 30 minute(s)     Therapy Charges for Today     Code Description Service Date Service Provider Modifiers Qty    [id number] HC OT SELF CARE/MGMT/TRAIN EA 15 MIN 9/3/2019 Poornima Middleton, OTHAYDE GO 2                      SURY Mae  9/3/2019

## 2019-09-04 ENCOUNTER — TELEPHONE (OUTPATIENT)
Dept: INTERVENTIONAL RADIOLOGY/VASCULAR | Facility: HOSPITAL | Age: 79
End: 2019-09-04

## 2019-09-04 ENCOUNTER — HOSPITAL ENCOUNTER (OUTPATIENT)
Dept: CT IMAGING | Facility: HOSPITAL | Age: 79
Discharge: HOME | End: 2019-09-04
Admitting: INTERNAL MEDICINE

## 2019-09-04 ENCOUNTER — APPOINTMENT (OUTPATIENT)
Dept: PET IMAGING | Facility: HOSPITAL | Age: 79
End: 2019-09-04

## 2019-09-04 DIAGNOSIS — C79.9 METASTATIC CANCER (HCC): ICD-10-CM

## 2019-09-04 LAB — CREAT BLDA-MCNC: 0.6 MG/DL (ref 0.6–1.3)

## 2019-09-04 PROCEDURE — 70491 CT SOFT TISSUE NECK W/DYE: CPT

## 2019-09-04 PROCEDURE — 25010000002 IOPAMIDOL 61 % SOLUTION: Performed by: INTERNAL MEDICINE

## 2019-09-04 PROCEDURE — 74177 CT ABD & PELVIS W/CONTRAST: CPT

## 2019-09-04 PROCEDURE — 82565 ASSAY OF CREATININE: CPT

## 2019-09-04 PROCEDURE — 71260 CT THORAX DX C+: CPT

## 2019-09-04 RX ADMIN — IOPAMIDOL 85 ML: 612 INJECTION, SOLUTION INTRAVENOUS at 12:11

## 2019-09-10 ENCOUNTER — OFFICE VISIT (OUTPATIENT)
Dept: ONCOLOGY | Facility: CLINIC | Age: 79
End: 2019-09-10

## 2019-09-10 ENCOUNTER — APPOINTMENT (OUTPATIENT)
Dept: LAB | Facility: HOSPITAL | Age: 79
End: 2019-09-10

## 2019-09-10 VITALS
TEMPERATURE: 98.5 F | BODY MASS INDEX: 24.31 KG/M2 | WEIGHT: 145.9 LBS | DIASTOLIC BLOOD PRESSURE: 77 MMHG | SYSTOLIC BLOOD PRESSURE: 147 MMHG | RESPIRATION RATE: 16 BRPM | HEIGHT: 65 IN | OXYGEN SATURATION: 94 % | HEART RATE: 69 BPM

## 2019-09-10 DIAGNOSIS — C79.9 METASTATIC CANCER (HCC): Primary | ICD-10-CM

## 2019-09-10 DIAGNOSIS — Z85.3 HISTORY OF RIGHT BREAST CANCER: ICD-10-CM

## 2019-09-10 DIAGNOSIS — N28.89 RENAL MASS, RIGHT: ICD-10-CM

## 2019-09-10 DIAGNOSIS — I27.20 PULMONARY HYPERTENSION (HCC): ICD-10-CM

## 2019-09-10 DIAGNOSIS — D32.0 MENINGIOMA, CEREBRAL (HCC): ICD-10-CM

## 2019-09-10 LAB
ALBUMIN SERPL-MCNC: 4 G/DL (ref 3.5–5.2)
ALBUMIN/GLOB SERPL: 1.5 G/DL (ref 1.1–2.4)
ALP SERPL-CCNC: 81 U/L (ref 38–116)
ALT SERPL W P-5'-P-CCNC: 11 U/L (ref 0–33)
ANION GAP SERPL CALCULATED.3IONS-SCNC: 9.6 MMOL/L (ref 5–15)
AST SERPL-CCNC: 14 U/L (ref 0–32)
BASOPHILS # BLD AUTO: 0.03 10*3/MM3 (ref 0–0.2)
BASOPHILS NFR BLD AUTO: 0.5 % (ref 0–1.5)
BILIRUB SERPL-MCNC: 0.5 MG/DL (ref 0.2–1.2)
BUN BLD-MCNC: 19 MG/DL (ref 6–20)
BUN/CREAT SERPL: 32.8 (ref 7.3–30)
CALCIUM SPEC-SCNC: 9.8 MG/DL (ref 8.5–10.2)
CHLORIDE SERPL-SCNC: 106 MMOL/L (ref 98–107)
CO2 SERPL-SCNC: 27.4 MMOL/L (ref 22–29)
CREAT BLD-MCNC: 0.58 MG/DL (ref 0.6–1.1)
DEPRECATED RDW RBC AUTO: 48.5 FL (ref 37–54)
EOSINOPHIL # BLD AUTO: 0.06 10*3/MM3 (ref 0–0.4)
EOSINOPHIL NFR BLD AUTO: 1.1 % (ref 0.3–6.2)
ERYTHROCYTE [DISTWIDTH] IN BLOOD BY AUTOMATED COUNT: 14.6 % (ref 12.3–15.4)
GFR SERPL CREATININE-BSD FRML MDRD: 100 ML/MIN/1.73
GLOBULIN UR ELPH-MCNC: 2.7 GM/DL (ref 1.8–3.5)
GLUCOSE BLD-MCNC: 97 MG/DL (ref 74–124)
HCT VFR BLD AUTO: 41.8 % (ref 34–46.6)
HGB BLD-MCNC: 12.8 G/DL (ref 12–15.9)
IMM GRANULOCYTES # BLD AUTO: 0.02 10*3/MM3 (ref 0–0.05)
IMM GRANULOCYTES NFR BLD AUTO: 0.4 % (ref 0–0.5)
LYMPHOCYTES # BLD AUTO: 0.88 10*3/MM3 (ref 0.7–3.1)
LYMPHOCYTES NFR BLD AUTO: 15.5 % (ref 19.6–45.3)
MCH RBC QN AUTO: 27.8 PG (ref 26.6–33)
MCHC RBC AUTO-ENTMCNC: 30.6 G/DL (ref 31.5–35.7)
MCV RBC AUTO: 90.9 FL (ref 79–97)
MONOCYTES # BLD AUTO: 0.44 10*3/MM3 (ref 0.1–0.9)
MONOCYTES NFR BLD AUTO: 7.8 % (ref 5–12)
NEUTROPHILS # BLD AUTO: 4.23 10*3/MM3 (ref 1.7–7)
NEUTROPHILS NFR BLD AUTO: 74.7 % (ref 42.7–76)
NRBC BLD AUTO-RTO: 0 /100 WBC (ref 0–0.2)
PLATELET # BLD AUTO: 140 10*3/MM3 (ref 140–450)
PMV BLD AUTO: 12.3 FL (ref 6–12)
POTASSIUM BLD-SCNC: 4 MMOL/L (ref 3.5–4.7)
PROT SERPL-MCNC: 6.7 G/DL (ref 6.3–8)
RBC # BLD AUTO: 4.6 10*6/MM3 (ref 3.77–5.28)
SODIUM BLD-SCNC: 143 MMOL/L (ref 134–145)
WBC NRBC COR # BLD: 5.66 10*3/MM3 (ref 3.4–10.8)

## 2019-09-10 PROCEDURE — 36416 COLLJ CAPILLARY BLOOD SPEC: CPT | Performed by: INTERNAL MEDICINE

## 2019-09-10 PROCEDURE — 85025 COMPLETE CBC W/AUTO DIFF WBC: CPT | Performed by: INTERNAL MEDICINE

## 2019-09-10 PROCEDURE — G0463 HOSPITAL OUTPT CLINIC VISIT: HCPCS | Performed by: INTERNAL MEDICINE

## 2019-09-10 PROCEDURE — 36415 COLL VENOUS BLD VENIPUNCTURE: CPT | Performed by: INTERNAL MEDICINE

## 2019-09-10 PROCEDURE — 99214 OFFICE O/P EST MOD 30 MIN: CPT | Performed by: INTERNAL MEDICINE

## 2019-09-10 PROCEDURE — 80053 COMPREHEN METABOLIC PANEL: CPT | Performed by: INTERNAL MEDICINE

## 2019-09-10 NOTE — PROGRESS NOTES
Subjective     REASON FOR FOLLOW-UP: 1.  History of breast cancer in 1988 status post surgery by Dr. Joseph Mera and tamoxifen for 5 years    2.  New onset portacaval lymphadenopathy    3.  Meningioma followed by Dr. Oliveira      Provide an opinion on any further workup or treatment                             HISTORY OF PRESENT ILLNESS:  The patient is a 79 y.o. year old female who is here for an opinion about the above issue.    History of Present Illness patient is a 79-year-old female with above mentioned history. Patient underwent hip surgery in May and developed pneumonia as a complication, she is currently still receiving breathing treatments. She is currently using a walker to ambulate. Patient notes fatigue but denies abdominal pain. She states her appetite is healthy and denies weight changes. She denies fevers, chills or night sweats.    We had discussed about obtaining a CT-guided liver biopsy and she states that she went for the liver biopsy but they said they could not do it.  Patient currently is totally asymptomatic from the portacaval lymph node.  She does not have fever or chills.  She states that she would rather be observed and if it worsens at that point to consider biopsy.    Interval history: Patient underwent CT scan of the chest abdomen pelvis.  The aortocaval lymph node is stable.  There is no progression.  Patient is totally asymptomatic.  We had discussed with her about obtaining EGD colonoscopy and had scheduled her with Dr. King but patient did not keep that appointment.  We discussed that the current CT shows some thickening in the gastric antrum and they cannot exclude a mass.  An endoscopy was suggested but patient refuses.  She tells me that she will let me know if she does want to go through it.    ONCOLOGY HISTORY  history of breast cancer in 1998 status post right mastectomy with axillary dissection, underwent tamoxifen for 5 years.  She had 25 lymph nodes removed and were  negative.  She has done well up until recently she went to see her primary care physician Dr.Walter Fong.  She had right upper extremity lymphedema for the last 2 months and a Doppler ultrasound of the right upper extremity was negative for any DVT.  Patient then has been sent to lymphedema clinic.  She also has bilateral lower extremity edema for quite some time.  As a result CT abdomen pelvis was done by Dr. Mak Matta and it showed a 3.5 cm portacaval lymph node.  She also had a right renal mass which was 4.3 cm which is thought to be slightly solid and she was referred to Dr. Roberts who has seen her and felt he will follow with observation with repeat CT scan.  I have reviewed the chest x-ray as well which is negative except cardiomegaly.  Patient was seen in the past by Dr. Graves and echocardiogram in November 2018 had shown valvular heart disease.  Currently her chest x-ray does show cardiomegaly and she does have shortness of breath with walking.  She does walk with a walker.    She denies any weight loss, no chest pain but does have shortness of breath.  She denies any active GI bleeding.  She has had colonoscopy in the past 5 years ago which was showing a polyp and she was repeat colonoscopy in 5 years which she is overdue now.  This was done by Dr. King in the past.  She is also overdue for her left breast mammogram.  She does not have any fever or night sweats.  She has not lost weight.  But she does feel fatigued.      She has intermittent thrombocytopenia.  She has a mild cough but no evidence of active infection right now.    She also has a history of meningioma that is followed by Dr. Oliveira.  She underwent a recent MRI of the brain and I have reviewed that.  MRI of the brain shows a heterogeneous mass overlying the right frontal lobe superolaterally with associated dural tail enhancement.  The mass is 3 cm.  The most posterior nonenhancing or necrotic component has decreased in size  "slightly from 11.6 mm to 8.6 mm.  Dr. Oliveira wanted to operate on her because she has been complaining of some drowsiness.  Patient does not want to go through surgery on the brain right now.  Patient states that the drowsiness started only when she started Keppra for her seizures 3 months ago.  She likely will require referral to neurology if her drowsiness gets worse.  Clinically she does not look like she has any neurological symptoms.      PA AND LATERAL CHEST 04/10/2019  Moderate cardiomegaly. No evidence of acute disease within  the chest.    CT ABDOMEN AND PELVIS 04/22/2019  Pathological portacaval lymphadenopathy.  Given patient's history of  breast cancer, this is most suggestive of metastatic disease.  There is a solid enhancing 4.3 cm right renal neoplasm. 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 Urology consult  is recommended. Suspected large subserosal fibroid.  Colonic diverticulosis.    CT Chest 05/09/2019- Patchy nodular infiltrates throughout the entire left lung  and at the right lung base consistent with pneumonia, likely due to an  atypical infectious agent. Status post right mastectomy. Otherwise  unremarkable CT imaging of the neck and chest.     Bone scan 05/09/2019-No scintigraphic evidence for bony metastatic disease.    CT Needle Biopsy Lymph Node 06/05/2019-No CT-guided core biopsy.able to be performed due to lesion location.    Past Medical History:   Diagnosis Date   • Acquired hypothyroidism    • Arthritis    • Benign essential hypertension    • Breast cancer (CMS/HCC) 1998    Right-negative LN per patient- mastectomy and 5 years of tamoxifen   • Broken bones    • Chronic kidney disease     \"mass on Kidney\"  urologist says it was benign   • Coronary artery disease     MV - takes propranolol   • Disease of thyroid gland    • Hyperglycemia    • Hypoxia    • Lymphadenopathy    • MVP (mitral valve " prolapse)    • Osteoporosis    • Pneumonia    • Seizures (CMS/HCC) 2018   • Skin cancer 2007        Past Surgical History:   Procedure Laterality Date   • BILATERAL OOPHORECTOMY  1999   • BREAST SURGERY  1998    Biopsy   • MASTECTOMY Right 07/1998   • SKIN BIOPSY     • SKIN LESION EXCISION  2007   • TOTAL HIP ARTHROPLASTY Left 5/12/2019    Procedure: LEFT ANTERIOR TOTAL HIP ARTHROPLASTY;  Surgeon: Jak Hdz MD;  Location: [facility];  Service: Orthopedics        Current Outpatient Medications on File Prior to Visit   Medication Sig Dispense Refill   • acetaminophen (TYLENOL) 325 MG tablet Take 2 tablets by mouth Every 6 (Six) Hours As Needed for Mild Pain  (pain or symptomatic fever).     • alendronate (FOSAMAX) 70 MG tablet TAKE 1 TABLET EVERY 7 DAYS 12 tablet 3   • atorvastatin (LIPITOR) 20 MG tablet TAKE 1 TABLET BY MOUTH EVERY DAY 30 tablet 5   • Calcium Carbonate (CALTRATE 600 PO) Take  by mouth Daily.     • Cholecalciferol (VITAMIN D PO) Take 1,000 Units by mouth Daily.     • fluticasone (FLONASE) 50 MCG/ACT nasal spray 2 sprays by Each Nare route Daily.     • furosemide (LASIX) 40 MG tablet TAKE 1 TABLET BY MOUTH EVERY DAY 90 tablet 0   • guaiFENesin (MUCINEX) 600 MG 12 hr tablet Take 1 tablet by mouth 2 (Two) Times a Day.     • levETIRAcetam (KEPPRA) 500 MG tablet TAKE 1 TABLET BY MOUTH TWICE DAILY 180 tablet 1   • levothyroxine (SYNTHROID, LEVOTHROID) 75 MCG tablet Take 1 tablet by mouth Daily. 90 tablet 3   • lisinopril (PRINIVIL,ZESTRIL) 20 MG tablet TAKE 1 TABLET BY MOUTH EVERY DAY 90 tablet 0   • PARoxetine (PAXIL) 10 MG tablet Take 1 tablet by mouth Every Morning. 30 tablet 5   • potassium chloride (K-DUR,KLOR-CON) 20 MEQ CR tablet TAKE 1 TABLET BY MOUTH EVERY DAY 90 tablet 0   • propranolol (INDERAL) 40 MG tablet TAKE 1 TABLET TWICE A  tablet 3     No current facility-administered medications on file prior to visit.         ALLERGIES:    Allergies   Allergen Reactions   •  Shellfish-Derived Products Swelling     shrimp   • Cefaclor Unknown (See Comments)     Pt believes it caused weakness. Pt tolerated ceftriaxone 5/19 admission        Social History     Socioeconomic History   • Marital status: Single     Spouse name: Not on file   • Number of children: Not on file   • Years of education: College   • Highest education level: Not on file   Occupational History   • Occupation: Teacher     Employer: RETIRED   Tobacco Use   • Smoking status: Never Smoker   • Smokeless tobacco: Never Used   Substance and Sexual Activity   • Alcohol use: No   • Drug use: No   • Sexual activity: Defer        Family History   Problem Relation Age of Onset   • Cancer Mother    • Colon cancer Mother 68   • Cancer Maternal Aunt    • Stroke Father    • Cancer Father 90   • Heart disease Father    • Stroke Sister         Review of Systems   Constitutional: Negative for appetite change, chills, diaphoresis, fatigue, fever and unexpected weight change.   HENT: Negative for hearing loss, sore throat and trouble swallowing.    Respiratory: Negative for cough, chest tightness, shortness of breath and wheezing.    Cardiovascular: Negative for chest pain, palpitations and leg swelling.   Gastrointestinal: Negative for abdominal distention, abdominal pain, constipation, diarrhea, nausea and vomiting.   Genitourinary: Negative for dysuria, frequency, hematuria and urgency.   Musculoskeletal: Negative for joint swelling.        No muscle weakness.   Skin: Negative for rash and wound.   Neurological: Negative for seizures, syncope, speech difficulty, weakness, numbness and headaches.   Hematological: Negative for adenopathy. Does not bruise/bleed easily.   Psychiatric/Behavioral: Negative for behavioral problems, confusion and suicidal ideas.     Objective     Vitals:    09/10/19 0951   BP: 147/77   Pulse: 69   Resp: 16   Temp: 98.5 °F (36.9 °C)   TempSrc: Oral   SpO2: 94%   Weight: 66.2 kg (145 lb 14.4 oz)   Height: 165.1  "cm (65\")   PainSc: 0-No pain     Current Status 9/10/2019   ECOG score 1       Physical Exam        GENERAL:  Well-developed, well-nourished in no acute distress.   SKIN:  Warm, dry without rashes, purpura or petechiae.  NECK:  Supple with good range of motion; no thyromegaly or masses, no JVD.  LYMPHATICS:  No cervical, supraclavicular, axillary or inguinal adenopathy.  CHEST:  Lungs clear to auscultation. Good airflow.  CARDIAC:  Regular rate and rhythm without murmurs, rubs or gallops. Normal S1,S2.  ABDOMEN:  Soft, nontender with no hepatosplenomegaly or masses.  EXTREMITIES:  No clubbing, cyanosis or edema.  NEUROLOGICAL:  Cranial Nerves II-XII grossly intact.  No focal neurological deficits.  PSYCHIATRIC:  Normal affect and mood.  ,      RECENT LABS:  Hematology WBC   Date Value Ref Range Status   06/25/2019 5.77 3.40 - 10.80 10*3/mm3 Final     RBC   Date Value Ref Range Status   06/25/2019 3.93 3.77 - 5.28 10*6/mm3 Final     Hemoglobin   Date Value Ref Range Status   06/25/2019 11.0 (L) 12.0 - 15.9 g/dL Final   06/19/2019 10.8 (L) 12.0 - 15.9 g/dL Final     Hematocrit   Date Value Ref Range Status   06/25/2019 37.0 34.0 - 46.6 % Final   06/19/2019 35.4 34.0 - 46.6 % Final     Platelets   Date Value Ref Range Status   06/25/2019 210 140 - 450 10*3/mm3 Final   06/19/2019 196 140 - 450 10*3/mm3 Final        Duplex Venous Upper Ext  Study Impression     • Right Internal Jugular: No deep vein thrombosis noted  • Right Subclavian: No deep vein thrombosis noted  • Right Axillary: No deep vein thrombosis noted  • Right Brachial: No deep vein thrombosis noted  • Right Radial: No deep vein thrombosis noted  • Right Ulnar: No deep vein thrombosis noted  • Right Basilic Upper Arm: No superficial thrombophlebitis noted  • Right Basilic Forearm: No superficial thrombophlebitis noted  • Right Cephalic Upper Arm: No superficial thrombophlebitis noted  • Right Cephalic Forearm: No superficial thrombophlebitis noted  • Left " Internal Jugular: No deep vein thrombosis noted.   • Left Subclavian: No deep vein thrombosis noted.     Imaging-    CT Abdomen, Pelvis 04/22/2019  FINDINGS: The liver demonstrates normal attenuation. No focal hepatic  lesions or intrahepatic biliary dilatation. There is enlarged lymph node  seen within the portacaval region measuring 3.5 x 3.1 cm. The  gallbladder, spleen and the pancreas is normal. Bilateral adrenal glands  are normal. Both kidneys are normal in size and attenuation. There is a  partly exophytic heterogeneous solid lesion arising from the midpole  medially within the right kidney measuring 4.3 cm transverse by 4 cm AP  and approximately 4 cm in craniocaudal dimension. The lesion  demonstrates areas of microscopic fat attenuation as well as enhancing  foci. There are bilateral renal cortical cysts present. A 6 mm calculus  is seen within the lower pole of the right kidney. The urinary bladder  is partially distended and normal. The uterus is anteverted. There  appears to be a subserosal fibroid measuring 4.6 x 3.7 cm. No abnormal  adnexal mass is seen. The small and large bowel loops demonstrate normal  caliber. Colonic diverticulosis is present. No pathological  retroperitoneal lymphadenopathy. Incidental note is made of a  retroaortic left renal vein. Degenerative disc disease is seen in the  spine. Vacuum disc phenomena is seen at multiple levels. Changes of  right mastectomy are present.     IMPRESSION:  1. Pathological portacaval lymphadenopathy.  Given patient's history of  breast cancer, this is most suggestive of metastatic disease.  2. There is a solid enhancing 4.3 cm right renal neoplasm. 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 Urology consult  is recommended.  3. Suspected large subserosal fibroid.  4. Colonic diverticulosis.    CT Chest 05/09/2019  FINDINGS: No abnormal masses or fluid  collections are identified in the  neck. There is no cervical lymphadenopathy. The parotid glands and  submandibular glands and thyroid gland are unremarkable. The pharynx and  larynx and trachea appear within normal limits. There are moderately  extensive multilevel degenerative disc changes in the cervical spine  that results in mild narrowing of the spinal canal at the C6-C7 level.  There is no encroachment on the spinal canal at any other level.     The patient is status post right mastectomy without breast  reconstruction. The chest wall is otherwise unremarkable. There is no  mediastinal or hilar or axillary lymphadenopathy. Lung window images  demonstrate small patchy nodular areas of infiltrate throughout the  entire left lung and also in the inferior aspect of the right lower lobe  consistent with pneumonia, likely due to an atypical infectious agent.  No morphologically suspicious lung masses are identified. The heart is  mildly enlarged. There are no pleural effusions.     IMPRESSION:  Patchy nodular infiltrates throughout the entire left lung  and at the right lung base consistent with pneumonia, likely due to an  atypical infectious agent. Status post right mastectomy. Otherwise  unremarkable CT imaging of the neck and chest.     Bone scan 05/09/2019    FINDINGS:  There is a scoliotic curvature of the thoracolumbar spine.  Degenerative uptake is present within the right mid cervical spine  overlying facet joints. This also degenerative uptake within the lumbar  spine. Arthritic uptake overlies both knees. There are no abnormal foci  of uptake to diagnose bony metastatic disease. Both kidneys and the  urinary bladder are visualized.     IMPRESSION:  No scintigraphic evidence for bony metastatic disease.    CT Needle Biopsy Lymph Node 06/05/2019  IMPRESSION:  No CT-guided core biopsy able to be performed due to lesion  location.         Assessment/Plan     1.  History of right breast cancer in 1998 status  post right  radical mastectomy, followed by tamoxifen x5 years without evidence of recurrence of 4.  Patient is overdue for left screening mammogram.    2.  Right upper extremity lymphedema since last 2 months is now followed by the lymphedema clinic.    3.  New portacaval lymph node 3.5 cm in size concerning for metastasis.  Given her history of breast cancer in the past certainly we would need to consider if she has evidence of any metastasis versus a new malignancy.  CT scan also showed evidence of a right kidney mass for which patient was seen by urologist Dr. Roberts who is going to follow that with observation with repeat CT in several months.  Reviewed CT scan and there is no evidence of any other lymphadenopathy and liver appears normal.  We will need to consider CT-guided biopsy of the portacaval lymph node  We will also obtain cancer markers today.  · CA 15-3 is 15.8 normal,  · CA 19-9 is 11.0, CEA is 1.43  · Patient scheduled for CT-guided biopsy but not done secondary to location  · Repeat CT suggests thickening of the stomach as of September 5, 2019.  But patient refuses endoscopy.  There is stability of the intra-abdominal lymph aortocaval lymph    4.  New onset right renal mass followed by Dr. Roberts, we will get his records.  He is following with observation and repeat CT.  · Reviewed CT from September 5, 2019 and the renal mass is stable.  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    5.  Bilateral lower extremity edema with cardiomegaly, reviewed his previous echocardiogram and it shows valvular heart disease and we will refer her to Dr. Graves.    6.  Meningioma followed by Dr. Oliveira who was considering surgery but patient wants to wait.  I have reviewed her recent MRI of the brain done March 2019.    7.  Seizure, new onset for which she has been on Keppra since last 3 months.  She has drowsiness since last 3 months she has been on Keppra.    8.  Thrombocytopenia, she has had intermittent  thrombocytopenia but today it is 196,000.    Plan     1. Reviewed CT scans   2. CT-guided biopsy of the portacaval lymph node was not obtained due to the location.  3. Refer to Dr. King to consider colonoscopy/EGD given new onset portacaval lymph node.  Patient has not kept that appointment and currently refuses endoscopy.    4. Follow-up with nurse practitioner in 2 months and with me in 4 months with labs        Kristi Elizabeth MD    CC- Dr. Mak Lemos

## 2019-09-23 ENCOUNTER — TELEPHONE (OUTPATIENT)
Dept: NEUROSURGERY | Facility: CLINIC | Age: 79
End: 2019-09-23

## 2019-09-23 NOTE — TELEPHONE ENCOUNTER
Cancelled appt with Dr. Oliveira and LM to make plans for moving forward. Her MRI brain with and without order will need to be changed as it is currently under Dr. Oliveira's name.

## 2019-11-07 ENCOUNTER — OFFICE VISIT (OUTPATIENT)
Dept: ONCOLOGY | Facility: CLINIC | Age: 79
End: 2019-11-07

## 2019-11-07 ENCOUNTER — LAB (OUTPATIENT)
Dept: LAB | Facility: HOSPITAL | Age: 79
End: 2019-11-07

## 2019-11-07 VITALS
DIASTOLIC BLOOD PRESSURE: 73 MMHG | HEIGHT: 65 IN | RESPIRATION RATE: 1 BRPM | OXYGEN SATURATION: 93 % | BODY MASS INDEX: 24.72 KG/M2 | TEMPERATURE: 98 F | WEIGHT: 148.4 LBS | HEART RATE: 63 BPM | SYSTOLIC BLOOD PRESSURE: 150 MMHG

## 2019-11-07 DIAGNOSIS — C79.9 METASTATIC CANCER (HCC): ICD-10-CM

## 2019-11-07 DIAGNOSIS — Z85.3 HISTORY OF RIGHT BREAST CANCER: Primary | ICD-10-CM

## 2019-11-07 LAB
ALBUMIN SERPL-MCNC: 4.1 G/DL (ref 3.5–5.2)
ALBUMIN/GLOB SERPL: 1.5 G/DL (ref 1.1–2.4)
ALP SERPL-CCNC: 81 U/L (ref 38–116)
ALT SERPL W P-5'-P-CCNC: 9 U/L (ref 0–33)
ANION GAP SERPL CALCULATED.3IONS-SCNC: 9.7 MMOL/L (ref 5–15)
AST SERPL-CCNC: 13 U/L (ref 0–32)
BASOPHILS # BLD AUTO: 0.03 10*3/MM3 (ref 0–0.2)
BASOPHILS NFR BLD AUTO: 0.5 % (ref 0–1.5)
BILIRUB SERPL-MCNC: 0.6 MG/DL (ref 0.2–1.2)
BUN BLD-MCNC: 22 MG/DL (ref 6–20)
BUN/CREAT SERPL: 36.7 (ref 7.3–30)
CALCIUM SPEC-SCNC: 10.1 MG/DL (ref 8.5–10.2)
CHLORIDE SERPL-SCNC: 105 MMOL/L (ref 98–107)
CO2 SERPL-SCNC: 29.3 MMOL/L (ref 22–29)
CREAT BLD-MCNC: 0.6 MG/DL (ref 0.6–1.1)
DEPRECATED RDW RBC AUTO: 49 FL (ref 37–54)
EOSINOPHIL # BLD AUTO: 0.09 10*3/MM3 (ref 0–0.4)
EOSINOPHIL NFR BLD AUTO: 1.5 % (ref 0.3–6.2)
ERYTHROCYTE [DISTWIDTH] IN BLOOD BY AUTOMATED COUNT: 14.6 % (ref 12.3–15.4)
GFR SERPL CREATININE-BSD FRML MDRD: 96 ML/MIN/1.73
GLOBULIN UR ELPH-MCNC: 2.8 GM/DL (ref 1.8–3.5)
GLUCOSE BLD-MCNC: 81 MG/DL (ref 74–124)
HCT VFR BLD AUTO: 42.3 % (ref 34–46.6)
HGB BLD-MCNC: 12.9 G/DL (ref 12–15.9)
IMM GRANULOCYTES # BLD AUTO: 0.03 10*3/MM3 (ref 0–0.05)
IMM GRANULOCYTES NFR BLD AUTO: 0.5 % (ref 0–0.5)
LYMPHOCYTES # BLD AUTO: 0.83 10*3/MM3 (ref 0.7–3.1)
LYMPHOCYTES NFR BLD AUTO: 13.8 % (ref 19.6–45.3)
MCH RBC QN AUTO: 28.2 PG (ref 26.6–33)
MCHC RBC AUTO-ENTMCNC: 30.5 G/DL (ref 31.5–35.7)
MCV RBC AUTO: 92.4 FL (ref 79–97)
MONOCYTES # BLD AUTO: 0.56 10*3/MM3 (ref 0.1–0.9)
MONOCYTES NFR BLD AUTO: 9.3 % (ref 5–12)
NEUTROPHILS # BLD AUTO: 4.46 10*3/MM3 (ref 1.7–7)
NEUTROPHILS NFR BLD AUTO: 74.4 % (ref 42.7–76)
NRBC BLD AUTO-RTO: 0 /100 WBC (ref 0–0.2)
PLATELET # BLD AUTO: 166 10*3/MM3 (ref 140–450)
PMV BLD AUTO: 12.3 FL (ref 6–12)
POTASSIUM BLD-SCNC: 4.1 MMOL/L (ref 3.5–4.7)
PROT SERPL-MCNC: 6.9 G/DL (ref 6.3–8)
RBC # BLD AUTO: 4.58 10*6/MM3 (ref 3.77–5.28)
SODIUM BLD-SCNC: 144 MMOL/L (ref 134–145)
WBC NRBC COR # BLD: 6 10*3/MM3 (ref 3.4–10.8)

## 2019-11-07 PROCEDURE — 80053 COMPREHEN METABOLIC PANEL: CPT

## 2019-11-07 PROCEDURE — G0463 HOSPITAL OUTPT CLINIC VISIT: HCPCS | Performed by: NURSE PRACTITIONER

## 2019-11-07 PROCEDURE — 85025 COMPLETE CBC W/AUTO DIFF WBC: CPT

## 2019-11-07 PROCEDURE — 36415 COLL VENOUS BLD VENIPUNCTURE: CPT

## 2019-11-07 PROCEDURE — 99213 OFFICE O/P EST LOW 20 MIN: CPT | Performed by: NURSE PRACTITIONER

## 2019-11-07 NOTE — PROGRESS NOTES
Subjective     REASON FOR FOLLOW-UP: 1.  History of breast cancer in 1988 status post surgery by Dr. Joseph Mera and tamoxifen for 5 years    2.  New onset portacaval lymphadenopathy    3.  Meningioma followed by Dr. Oliveira    4.  11/7/2019 patient seen for scheduled follow-up, labs stable with no new concerns.      Provide an opinion on any further workup or treatment                             HISTORY OF PRESENT ILLNESS:  The patient is a 79 y.o. year old female who is here for an opinion about the above issue.    History of Present Illness patient is a 79-year-old female with above mentioned history here today for scheduled lab review and assessment.  She reports feeling well in the office today.  She recently moved to an assisted living complex and is quite pleased with this transition.  She explains that the social aspect has lifted her spirits and overall performance status.  She is eating well as evidenced by 3 pound weight gain.  Her CBC is stable.  She denies new symptoms such as fever, chills, shortness of breath, bleeding, bruising or skin changes.  Her vital signs are stable.    She is urinating without issue.  She did recently see a urologist to follow-up on an abnormal finding on imaging.  She states that no intervention was needed, however, she was told that she did have a kidney stone.  She is completely asymptomatic of this.    ONCOLOGY HISTORY  history of breast cancer in 1998 status post right mastectomy with axillary dissection, underwent tamoxifen for 5 years.  She had 25 lymph nodes removed and were negative.  She has done well up until recently she went to see her primary care physician Dr.Walter Fong.  She had right upper extremity lymphedema for the last 2 months and a Doppler ultrasound of the right upper extremity was negative for any DVT.  Patient then has been sent to lymphedema clinic.  She also has bilateral lower extremity edema for quite some time.  As a result CT abdomen pelvis  was done by Dr. Mak Matta and it showed a 3.5 cm portacaval lymph node.  She also had a right renal mass which was 4.3 cm which is thought to be slightly solid and she was referred to Dr. Roberts who has seen her and felt he will follow with observation with repeat CT scan.  I have reviewed the chest x-ray as well which is negative except cardiomegaly.  Patient was seen in the past by Dr. Graves and echocardiogram in November 2018 had shown valvular heart disease.  Currently her chest x-ray does show cardiomegaly and she does have shortness of breath with walking.  She does walk with a walker.    She denies any weight loss, no chest pain but does have shortness of breath.  She denies any active GI bleeding.  She has had colonoscopy in the past 5 years ago which was showing a polyp and she was repeat colonoscopy in 5 years which she is overdue now.  This was done by Dr. King in the past.  She is also overdue for her left breast mammogram.  She does not have any fever or night sweats.  She has not lost weight.  But she does feel fatigued.      She has intermittent thrombocytopenia.  She has a mild cough but no evidence of active infection right now.    She also has a history of meningioma that is followed by Dr. Oliveira.  She underwent a recent MRI of the brain and I have reviewed that.  MRI of the brain shows a heterogeneous mass overlying the right frontal lobe superolaterally with associated dural tail enhancement.  The mass is 3 cm.  The most posterior nonenhancing or necrotic component has decreased in size slightly from 11.6 mm to 8.6 mm.  Dr. Oliveira wanted to operate on her because she has been complaining of some drowsiness.  Patient does not want to go through surgery on the brain right now.  Patient states that the drowsiness started only when she started Keppra for her seizures 3 months ago.  She likely will require referral to neurology if her drowsiness gets worse.  Clinically she does not look like  "she has any neurological symptoms.      PA AND LATERAL CHEST 04/10/2019  Moderate cardiomegaly. No evidence of acute disease within  the chest.    CT ABDOMEN AND PELVIS 04/22/2019  Pathological portacaval lymphadenopathy.  Given patient's history of  breast cancer, this is most suggestive of metastatic disease.  There is a solid enhancing 4.3 cm right renal neoplasm. 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 Urology consult  is recommended. Suspected large subserosal fibroid.  Colonic diverticulosis.    CT Chest 05/09/2019- Patchy nodular infiltrates throughout the entire left lung  and at the right lung base consistent with pneumonia, likely due to an  atypical infectious agent. Status post right mastectomy. Otherwise  unremarkable CT imaging of the neck and chest.     Bone scan 05/09/2019-No scintigraphic evidence for bony metastatic disease.    CT Needle Biopsy Lymph Node 06/05/2019-No CT-guided core biopsy.able to be performed due to lesion location.    Past Medical History:   Diagnosis Date   • Acquired hypothyroidism    • Arthritis    • Benign essential hypertension    • Breast cancer (CMS/HCC) 1998    Right-negative LN per patient- mastectomy and 5 years of tamoxifen   • Broken bones    • Chronic kidney disease     \"mass on Kidney\"  urologist says it was benign   • Coronary artery disease     MV - takes propranolol   • Disease of thyroid gland    • Hyperglycemia    • Hypoxia    • Lymphadenopathy    • MVP (mitral valve prolapse)    • Osteoporosis    • Pneumonia    • Seizures (CMS/HCC) 2018   • Skin cancer 2007        Past Surgical History:   Procedure Laterality Date   • BILATERAL OOPHORECTOMY  1999   • BREAST SURGERY  1998    Biopsy   • MASTECTOMY Right 07/1998   • SKIN BIOPSY     • SKIN LESION EXCISION  2007   • TOTAL HIP ARTHROPLASTY Left 5/12/2019    Procedure: LEFT ANTERIOR TOTAL HIP ARTHROPLASTY;  Surgeon: " Jak Hdz MD;  Location: [facility];  Service: Orthopedics        Current Outpatient Medications on File Prior to Visit   Medication Sig Dispense Refill   • acetaminophen (TYLENOL) 325 MG tablet Take 2 tablets by mouth Every 6 (Six) Hours As Needed for Mild Pain  (pain or symptomatic fever).     • alendronate (FOSAMAX) 70 MG tablet TAKE 1 TABLET EVERY 7 DAYS 12 tablet 3   • Calcium Carbonate (CALTRATE 600 PO) Take  by mouth Daily.     • Cholecalciferol (VITAMIN D PO) Take 1,000 Units by mouth Daily.     • fluticasone (FLONASE) 50 MCG/ACT nasal spray 2 sprays by Each Nare route Daily.     • furosemide (LASIX) 40 MG tablet TAKE 1 TABLET BY MOUTH EVERY DAY 90 tablet 0   • guaiFENesin (MUCINEX) 600 MG 12 hr tablet Take 1 tablet by mouth 2 (Two) Times a Day.     • levETIRAcetam (KEPPRA) 500 MG tablet TAKE 1 TABLET BY MOUTH TWICE DAILY 180 tablet 1   • levothyroxine (SYNTHROID, LEVOTHROID) 75 MCG tablet Take 1 tablet by mouth Daily. 90 tablet 3   • lisinopril (PRINIVIL,ZESTRIL) 20 MG tablet TAKE 1 TABLET BY MOUTH EVERY DAY 90 tablet 0   • PARoxetine (PAXIL) 10 MG tablet Take 1 tablet by mouth Every Morning. 30 tablet 5   • potassium chloride (K-DUR,KLOR-CON) 20 MEQ CR tablet TAKE 1 TABLET BY MOUTH EVERY DAY 90 tablet 0   • propranolol (INDERAL) 40 MG tablet TAKE 1 TABLET TWICE A  tablet 3   • atorvastatin (LIPITOR) 20 MG tablet TAKE 1 TABLET BY MOUTH EVERY DAY 30 tablet 5     No current facility-administered medications on file prior to visit.         ALLERGIES:    Allergies   Allergen Reactions   • Shellfish-Derived Products Swelling     shrimp   • Cefaclor Unknown (See Comments)     Pt believes it caused weakness. Pt tolerated ceftriaxone 5/19 admission        Social History     Socioeconomic History   • Marital status: Single     Spouse name: Not on file   • Number of children: Not on file   • Years of education: College   • Highest education level: Not on file   Occupational History   •  "Occupation: Teacher     Employer: RETIRED   Tobacco Use   • Smoking status: Never Smoker   • Smokeless tobacco: Never Used   Substance and Sexual Activity   • Alcohol use: No   • Drug use: No   • Sexual activity: Defer        Family History   Problem Relation Age of Onset   • Cancer Mother    • Colon cancer Mother 68   • Cancer Maternal Aunt    • Stroke Father    • Cancer Father 90   • Heart disease Father    • Stroke Sister         Review of Systems   Constitutional: Negative for appetite change, chills, diaphoresis, fatigue, fever and unexpected weight change.   HENT: Negative for hearing loss, sore throat and trouble swallowing.    Respiratory: Negative for cough, chest tightness, shortness of breath and wheezing.    Cardiovascular: Negative for chest pain, palpitations and leg swelling.   Gastrointestinal: Negative for abdominal distention, abdominal pain, constipation, diarrhea, nausea and vomiting.   Genitourinary: Negative for dysuria, frequency, hematuria and urgency.        See HPI   Musculoskeletal: Negative for joint swelling.        No muscle weakness.   Skin: Negative for rash and wound.   Neurological: Negative for seizures, syncope, speech difficulty, weakness, numbness and headaches.   Hematological: Negative for adenopathy. Does not bruise/bleed easily.   Psychiatric/Behavioral: Negative for behavioral problems, confusion and suicidal ideas.     Objective     Vitals:    11/07/19 1109   BP: 150/73   Pulse: 63   Resp: (!) 1   Temp: 98 °F (36.7 °C)   TempSrc: Oral   SpO2: 93%   Weight: 67.3 kg (148 lb 6.4 oz)   Height: 165.1 cm (65\")   PainSc: 0-No pain     Current Status 9/10/2019   ECOG score 1       Physical Exam        GENERAL:  Well-developed, well-nourished in no acute distress.  She uses a walker to assist with ambulation.  She is accompanied by a friend.  SKIN:  Warm, dry without rashes, purpura or petechiae.  NECK:  Supple with good range of motion; no thyromegaly or masses, no " JVD.  LYMPHATICS:  No cervical, supraclavicular, axillary or inguinal adenopathy.  CHEST:  Lungs clear to auscultation. Good airflow.  CARDIAC:  Regular rate and rhythm without murmurs, rubs or gallops. Normal S1,S2.  ABDOMEN:  Soft, nontender with no hepatosplenomegaly or masses.  EXTREMITIES:  No clubbing, cyanosis or edema.  NEUROLOGICAL:  Cranial Nerves II-XII grossly intact.  No focal neurological deficits.  PSYCHIATRIC:  Normal affect and mood.  ,      RECENT LABS:  Hematology WBC   Date Value Ref Range Status   11/07/2019 6.00 3.40 - 10.80 10*3/mm3 Final   06/25/2019 5.77 3.40 - 10.80 10*3/mm3 Final     RBC   Date Value Ref Range Status   11/07/2019 4.58 3.77 - 5.28 10*6/mm3 Final   06/25/2019 3.93 3.77 - 5.28 10*6/mm3 Final     Hemoglobin   Date Value Ref Range Status   11/07/2019 12.9 12.0 - 15.9 g/dL Final     Hematocrit   Date Value Ref Range Status   11/07/2019 42.3 34.0 - 46.6 % Final     Platelets   Date Value Ref Range Status   11/07/2019 166 140 - 450 10*3/mm3 Final        Duplex Venous Upper Ext  Study Impression     • Right Internal Jugular: No deep vein thrombosis noted  • Right Subclavian: No deep vein thrombosis noted  • Right Axillary: No deep vein thrombosis noted  • Right Brachial: No deep vein thrombosis noted  • Right Radial: No deep vein thrombosis noted  • Right Ulnar: No deep vein thrombosis noted  • Right Basilic Upper Arm: No superficial thrombophlebitis noted  • Right Basilic Forearm: No superficial thrombophlebitis noted  • Right Cephalic Upper Arm: No superficial thrombophlebitis noted  • Right Cephalic Forearm: No superficial thrombophlebitis noted  • Left Internal Jugular: No deep vein thrombosis noted.   • Left Subclavian: No deep vein thrombosis noted.     Imaging-    CT Abdomen, Pelvis 04/22/2019  FINDINGS: The liver demonstrates normal attenuation. No focal hepatic  lesions or intrahepatic biliary dilatation. There is enlarged lymph node  seen within the portacaval region  measuring 3.5 x 3.1 cm. The  gallbladder, spleen and the pancreas is normal. Bilateral adrenal glands  are normal. Both kidneys are normal in size and attenuation. There is a  partly exophytic heterogeneous solid lesion arising from the midpole  medially within the right kidney measuring 4.3 cm transverse by 4 cm AP  and approximately 4 cm in craniocaudal dimension. The lesion  demonstrates areas of microscopic fat attenuation as well as enhancing  foci. There are bilateral renal cortical cysts present. A 6 mm calculus  is seen within the lower pole of the right kidney. The urinary bladder  is partially distended and normal. The uterus is anteverted. There  appears to be a subserosal fibroid measuring 4.6 x 3.7 cm. No abnormal  adnexal mass is seen. The small and large bowel loops demonstrate normal  caliber. Colonic diverticulosis is present. No pathological  retroperitoneal lymphadenopathy. Incidental note is made of a  retroaortic left renal vein. Degenerative disc disease is seen in the  spine. Vacuum disc phenomena is seen at multiple levels. Changes of  right mastectomy are present.     IMPRESSION:  1. Pathological portacaval lymphadenopathy.  Given patient's history of  breast cancer, this is most suggestive of metastatic disease.  2. There is a solid enhancing 4.3 cm right renal neoplasm. 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 Urology consult  is recommended.  3. Suspected large subserosal fibroid.  4. Colonic diverticulosis.    CT Chest 05/09/2019  FINDINGS: No abnormal masses or fluid collections are identified in the  neck. There is no cervical lymphadenopathy. The parotid glands and  submandibular glands and thyroid gland are unremarkable. The pharynx and  larynx and trachea appear within normal limits. There are moderately  extensive multilevel degenerative disc changes in the cervical spine  that results in  mild narrowing of the spinal canal at the C6-C7 level.  There is no encroachment on the spinal canal at any other level.     The patient is status post right mastectomy without breast  reconstruction. The chest wall is otherwise unremarkable. There is no  mediastinal or hilar or axillary lymphadenopathy. Lung window images  demonstrate small patchy nodular areas of infiltrate throughout the  entire left lung and also in the inferior aspect of the right lower lobe  consistent with pneumonia, likely due to an atypical infectious agent.  No morphologically suspicious lung masses are identified. The heart is  mildly enlarged. There are no pleural effusions.     IMPRESSION:  Patchy nodular infiltrates throughout the entire left lung  and at the right lung base consistent with pneumonia, likely due to an  atypical infectious agent. Status post right mastectomy. Otherwise  unremarkable CT imaging of the neck and chest.     Bone scan 05/09/2019    FINDINGS:  There is a scoliotic curvature of the thoracolumbar spine.  Degenerative uptake is present within the right mid cervical spine  overlying facet joints. This also degenerative uptake within the lumbar  spine. Arthritic uptake overlies both knees. There are no abnormal foci  of uptake to diagnose bony metastatic disease. Both kidneys and the  urinary bladder are visualized.     IMPRESSION:  No scintigraphic evidence for bony metastatic disease.    CT Needle Biopsy Lymph Node 06/05/2019  IMPRESSION:  No CT-guided core biopsy able to be performed due to lesion  location.         Assessment/Plan     1.  History of right breast cancer in 1998 status post right  radical mastectomy, followed by tamoxifen x5 years without evidence of recurrence.  Mammogram obtained May 9, 2019 benign.    2.  Right upper extremity lymphedema since last 2 months is now followed by the lymphedema clinic.  Her symptoms have improved.    3.  New portacaval lymph node 3.5 cm in size concerning for  metastasis.  Given her history of breast cancer in the past certainly we would need to consider if she has evidence of any metastasis versus a new malignancy.  CT scan also showed evidence of a right kidney mass for which patient was seen by urologist Dr. Roberts who is going to follow that with observation with repeat CT in several months.  Reviewed CT scan and there is no evidence of any other lymphadenopathy and liver appears normal.  We will need to consider CT-guided biopsy of the portacaval lymph node  We will also obtain cancer markers today.  · CA 15-3 is 15.8 normal,  · CA 19-9 is 11.0, CEA is 1.43  · Patient scheduled for CT-guided biopsy but not done secondary to location  · Repeat CT suggests thickening of the stomach as of September 5, 2019.  But patient refuses endoscopy.  There is stability of the intra-abdominal lymph aortocaval lymph, Clara reviewed results with the patient on 9/10/2019.    4.  New onset right renal mass followed by Dr. Roberts, we will get his records.  He is following with observation and repeat CT.  · Reviewed CT from September 5, 2019 and the renal mass is stable.  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  · She did recently meet with urology who made her aware that she has a kidney stone.  She is completely asymptomatic.    5.  Bilateral lower extremity edema with cardiomegaly, reviewed his previous echocardiogram and it shows valvular heart disease and we will refer her to Dr. Graves.    6.  Meningioma followed by Dr. Oliveira who was considering surgery but patient wants to wait.  I have reviewed her recent MRI of the brain done March 2019, she has another MRI scheduled 1/29/2019.    7.  Seizure no reports of recent seizure activity.    8.  Thrombocytopenia, she has had intermittent thrombocytopenia , platelet count today is within normal limits at 166,000.    Plan     1. She will return as already scheduled in January 2020 to see Dr. Elizabeth.  2. I have asked the patient to call  the office with any new or worsening symptoms before her next scheduled visit.        ROMAINE Madrigal    CC- Dr. Mak Lemos

## 2019-12-20 ENCOUNTER — HOSPITAL ENCOUNTER (OUTPATIENT)
Dept: OCCUPATIONAL THERAPY | Facility: HOSPITAL | Age: 79
Setting detail: THERAPIES SERIES
Discharge: HOME | End: 2019-12-20

## 2019-12-20 DIAGNOSIS — I97.2 POST-MASTECTOMY LYMPHEDEMA SYNDROME: Primary | ICD-10-CM

## 2019-12-20 PROCEDURE — 97535 SELF CARE MNGMENT TRAINING: CPT

## 2019-12-20 PROCEDURE — 97140 MANUAL THERAPY 1/> REGIONS: CPT

## 2019-12-20 NOTE — THERAPY PROGRESS REPORT/RE-CERT
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   • Hyperglycemia   • Lymphedema of arm   • History of right breast cancer   • Renal mass, right   • Lymphadenopathy, abdominal   • Left displaced femoral neck fracture (CMS/HCC)   • Pneumonia   • Hypoxia   • Acute bronchiolitis due to human metapneumovirus   • Pulmonary hypertension (CMS/HCC)   • History of pneumonia   • Mitral regurgitation   • Tricuspid regurgitation        Past Medical History:   Diagnosis Date   • Acquired hypothyroidism    • Arthritis    • Benign essential hypertension    • Breast cancer (CMS/HCC)     Right-negative LN per patient- mastectomy and 5 years of tamoxifen   • Broken bones    • Chronic kidney disease     \"mass on Kidney\"  urologist says it was benign   • Coronary artery disease     MV - takes propranolol   • Disease of thyroid gland    • Hyperglycemia    • Hypoxia    • Lymphadenopathy    • MVP (mitral valve prolapse)    • Osteoporosis    • Pneumonia    • Seizures (CMS/HCC) 2018   • Skin cancer         Past Surgical History:   Procedure Laterality Date   • BILATERAL OOPHORECTOMY     • BREAST SURGERY      Biopsy   • MASTECTOMY Right 1998   • SKIN BIOPSY     • SKIN LESION EXCISION     • TOTAL HIP ARTHROPLASTY Left 2019    Procedure: LEFT ANTERIOR TOTAL HIP ARTHROPLASTY;  Surgeon: Jak Hdz MD;  Location: [facility] OR;  Service: Orthopedics         Visit Dx:     ICD-10-CM ICD-9-CM   1. Post-mastectomy " lymphedema syndrome I97.2 457.0           Lymphedema     Row Name 12/20/19 1400             Subjective Pain    Able to rate subjective pain?  yes  -RE      Pre-Treatment Pain Level  0  -RE      Post-Treatment Pain Level  0  -RE         Subjective Comments    Subjective Comments  Patient returns today after moving to an independant living apartment with available asssitance for donning and doffing compression products.  She is interested in using a compression sleeve during the day and continuing with her velcro product at night. She feels this will make dressing easier.    -RE         Lymphedema Edema Assessment    Ptting Edema Category  By grade out of 4  -RE      Pitting Edema  + 3/4;Moderate  -RE         Lymphedema Sensation    Lymphedema Sensation Comments  numbness and tingling in fingers 2 and 3 on the R side  -RE         BUE Circumferential (cm)    Measurement Location 1  Axilla  -RE      Right 1  28.5 cm  -RE      Measurement Location 2  15 cm above elbow  -RE      Right 2  27 cm  -RE      Measurement Location 3  10 cm above elbow  -RE      Right 3  26.5 cm  -RE      Measurement Location 4  5 cm above elbow  -RE      Right 4  27 cm  -RE      Measurement Location 5  Elbow  -RE      Right 5  24 cm  -RE      Measurement Location 6  5 cm below elbow  -RE      Right 6  25.5 cm  -RE      Measurement Location 7  10 cm below elbow  -RE      Right 7  25.5 cm  -RE      Measurement Location 8  15 cm below elbow  -RE      Right 8  22.5 cm  -RE      Measurement Location 9  20 cm below elbow  -RE      Right 9  20 cm  -RE      Measurement Location 10  Wrist  -RE      Right 10  17 cm  -RE      Measurement Location 11  Mid palm  -RE      Right 11  17 cm  -RE      RUE Circumferential Total  260.5 cm  -RE         Compression/Skin Care    Compression/Skin Care Comments  Measured for Juzo sleeve and gauntlet both size 4, long sleeve 30-40mmHg  -RE        User Key  (r) = Recorded By, (t) = Taken By, (c) = Cosigned By    Initials  Name Provider Type    Poornima Torres OTR Occupational Therapist                  Therapy Education  Education Details: discussed at length her current living situation and preferences regarding products and any further therapy.  Did expalin the purpose of garments is maintanence vs reduction so not to expect significant improvement.     Given: Edema management  Program: Modified  How Provided: Verbal, Written  Provided to: Patient, Caregiver  Level of Understanding: Teach back education performed, Verbalized  05895 - OT Self Care/Mgmt Minutes: 45         Manual Rx (last 36 hours)      Manual Treatments     Row Name 12/20/19 1710             Total Minutes    80991 - OT Manual Therapy Minutes  15  -RE        User Key  (r) = Recorded By, (t) = Taken By, (c) = Cosigned By    Initials Name Provider Type    Poornima Torres OTR Occupational Therapist        OT Goals     Row Name 12/20/19 1700          OT Short Term Goals    STG Date to Achieve  01/03/20  -RE        Long Term Goals    LTG Date to Achieve  01/17/20  -RE     LTG 3  Patient independent and compliant with compression garments and night compression as indicated for self-management of edema.   -RE     LTG 3 Progress  New;Ongoing  -RE        Time Calculation    OT Goal Re-Cert Due Date  03/20/20  -RE       User Key  (r) = Recorded By, (t) = Taken By, (c) = Cosigned By    Initials Name Provider Type    Poornima Torres OTR Occupational Therapist          OT Assessment/Plan     Row Name 12/20/19 1657          OT Assessment    Functional Limitations  Performance in self-care ADL  -RE     Impairments  Edema;Impaired lymphatic circulation  -RE     Assessment Comments  Reassessment indicates an increase in edema particularly inthe forearm. Patient reports she is using her ReadyWrap daily and at night but it does slip down and is hard to find clothing which will fit over it.  She would like to try a compression sleeve for day use since she now has assistance for  donning and dokoing.  She is not interested in more CDT therapy to decrease the edema, and I think this is reasonable given her limited mobility and decreased endurance.  I have recommended a Juzo sleeve and gauntlet 30-40mmHg.  She plans to order this and return to me for a fitting.  She will benefit from skilled OT for fitting and assessment of compression and instruction in use and care of this product.    -RE     Please refer to paper survey for additional self-reported information  No  -RE     OT Diagnosis  post mastectomy lymphedema  -RE     OT Rehab Potential  Good  -RE     Patient/caregiver participated in establishment of treatment plan and goals  Yes  -RE     Patient would benefit from skilled therapy intervention  Yes  -RE        OT Plan    OT Frequency  Other (comment)  -RE     Predicted Duration of Therapy Intervention (Therapy Eval)  1-2 visits to fitting and assessment of compression products  -RE     Planned CPT's?  OT THER PROC EA 15 MIN: [id number];OT SELF CARE/MGMT/TRAIN 15 MIN: 54391;OT MANUAL THERAPY EA 15 MIN: 34821;OT THER ACT EA 15 MIN: [id number]  -RE     Planned Therapy Interventions (Optional Details)  manual therapy techniques;patient/family education;other (see comments) ADL's  -RE     OT Plan Comments  See again when pt has sleeve  -RE       User Key  (r) = Recorded By, (t) = Taken By, (c) = Cosigned By    Initials Name Provider Type    Poornima Torres OTR Occupational Therapist                    Time Calculation:   OT Start Time: 1415  OT Stop Time: 1515  OT Time Calculation (min): 60 min     Therapy Charges for Today     Code Description Service Date Service Provider Modifiers Qty    [id number]  OT MANUAL THERAPY EA 15 MIN 12/20/2019 Poornima Middleton OTR KX, GO 1    [id number]  OT SELF CARE/MGMT/TRAIN EA 15 MIN 12/20/2019 Poornima Middleton OTR KX, GO 3                    SURY Mae  12/20/2019

## 2020-01-02 ENCOUNTER — LAB (OUTPATIENT)
Dept: LAB | Facility: HOSPITAL | Age: 80
End: 2020-01-02

## 2020-01-02 ENCOUNTER — OFFICE VISIT (OUTPATIENT)
Dept: ONCOLOGY | Facility: CLINIC | Age: 80
End: 2020-01-02

## 2020-01-02 VITALS
HEIGHT: 65 IN | HEART RATE: 78 BPM | OXYGEN SATURATION: 92 % | BODY MASS INDEX: 24.89 KG/M2 | RESPIRATION RATE: 16 BRPM | SYSTOLIC BLOOD PRESSURE: 119 MMHG | TEMPERATURE: 97.8 F | WEIGHT: 149.4 LBS | DIASTOLIC BLOOD PRESSURE: 60 MMHG

## 2020-01-02 DIAGNOSIS — N28.89 RENAL MASS, RIGHT: ICD-10-CM

## 2020-01-02 DIAGNOSIS — R59.1 LYMPHADENOPATHY: ICD-10-CM

## 2020-01-02 DIAGNOSIS — D32.0 MENINGIOMA, CEREBRAL (HCC): ICD-10-CM

## 2020-01-02 DIAGNOSIS — C79.9 METASTATIC CANCER (HCC): ICD-10-CM

## 2020-01-02 DIAGNOSIS — Z85.3 HISTORY OF RIGHT BREAST CANCER: Primary | ICD-10-CM

## 2020-01-02 LAB
ALBUMIN SERPL-MCNC: 3.7 G/DL (ref 3.5–5.2)
ALBUMIN/GLOB SERPL: 1.2 G/DL (ref 1.1–2.4)
ALP SERPL-CCNC: 94 U/L (ref 38–116)
ALT SERPL W P-5'-P-CCNC: 9 U/L (ref 0–33)
ANION GAP SERPL CALCULATED.3IONS-SCNC: 10.5 MMOL/L (ref 5–15)
AST SERPL-CCNC: 11 U/L (ref 0–32)
BASOPHILS # BLD AUTO: 0.04 10*3/MM3 (ref 0–0.2)
BASOPHILS NFR BLD AUTO: 0.6 % (ref 0–1.5)
BILIRUB SERPL-MCNC: 0.3 MG/DL (ref 0.2–1.2)
BUN BLD-MCNC: 20 MG/DL (ref 6–20)
BUN/CREAT SERPL: 35.7 (ref 7.3–30)
CALCIUM SPEC-SCNC: 9.9 MG/DL (ref 8.5–10.2)
CANCER AG15-3 SERPL-ACNC: 31.4 U/ML
CEA SERPL-MCNC: 2.01 NG/ML
CHLORIDE SERPL-SCNC: 106 MMOL/L (ref 98–107)
CO2 SERPL-SCNC: 26.5 MMOL/L (ref 22–29)
CREAT BLD-MCNC: 0.56 MG/DL (ref 0.6–1.1)
DEPRECATED RDW RBC AUTO: 44.7 FL (ref 37–54)
EOSINOPHIL # BLD AUTO: 0.24 10*3/MM3 (ref 0–0.4)
EOSINOPHIL NFR BLD AUTO: 3.4 % (ref 0.3–6.2)
ERYTHROCYTE [DISTWIDTH] IN BLOOD BY AUTOMATED COUNT: 13.8 % (ref 12.3–15.4)
GFR SERPL CREATININE-BSD FRML MDRD: 104 ML/MIN/1.73
GLOBULIN UR ELPH-MCNC: 3 GM/DL (ref 1.8–3.5)
GLUCOSE BLD-MCNC: 103 MG/DL (ref 74–124)
HCT VFR BLD AUTO: 41 % (ref 34–46.6)
HGB BLD-MCNC: 12.7 G/DL (ref 12–15.9)
IMM GRANULOCYTES # BLD AUTO: 0.06 10*3/MM3 (ref 0–0.05)
IMM GRANULOCYTES NFR BLD AUTO: 0.8 % (ref 0–0.5)
LYMPHOCYTES # BLD AUTO: 1.01 10*3/MM3 (ref 0.7–3.1)
LYMPHOCYTES NFR BLD AUTO: 14.2 % (ref 19.6–45.3)
MCH RBC QN AUTO: 27.7 PG (ref 26.6–33)
MCHC RBC AUTO-ENTMCNC: 31 G/DL (ref 31.5–35.7)
MCV RBC AUTO: 89.3 FL (ref 79–97)
MONOCYTES # BLD AUTO: 0.7 10*3/MM3 (ref 0.1–0.9)
MONOCYTES NFR BLD AUTO: 9.9 % (ref 5–12)
NEUTROPHILS # BLD AUTO: 5.05 10*3/MM3 (ref 1.7–7)
NEUTROPHILS NFR BLD AUTO: 71.1 % (ref 42.7–76)
NRBC BLD AUTO-RTO: 0 /100 WBC (ref 0–0.2)
PLATELET # BLD AUTO: 203 10*3/MM3 (ref 140–450)
PMV BLD AUTO: 11.4 FL (ref 6–12)
POTASSIUM BLD-SCNC: 4.3 MMOL/L (ref 3.5–4.7)
PROT SERPL-MCNC: 6.7 G/DL (ref 6.3–8)
RBC # BLD AUTO: 4.59 10*6/MM3 (ref 3.77–5.28)
SODIUM BLD-SCNC: 143 MMOL/L (ref 134–145)
WBC NRBC COR # BLD: 7.1 10*3/MM3 (ref 3.4–10.8)

## 2020-01-02 PROCEDURE — G0463 HOSPITAL OUTPT CLINIC VISIT: HCPCS | Performed by: INTERNAL MEDICINE

## 2020-01-02 PROCEDURE — 36415 COLL VENOUS BLD VENIPUNCTURE: CPT

## 2020-01-02 PROCEDURE — 80053 COMPREHEN METABOLIC PANEL: CPT

## 2020-01-02 PROCEDURE — 99214 OFFICE O/P EST MOD 30 MIN: CPT | Performed by: INTERNAL MEDICINE

## 2020-01-02 PROCEDURE — 85025 COMPLETE CBC W/AUTO DIFF WBC: CPT

## 2020-01-02 PROCEDURE — 86300 IMMUNOASSAY TUMOR CA 15-3: CPT | Performed by: INTERNAL MEDICINE

## 2020-01-02 PROCEDURE — 82378 CARCINOEMBRYONIC ANTIGEN: CPT | Performed by: INTERNAL MEDICINE

## 2020-01-02 RX ORDER — LISINOPRIL 5 MG/1
TABLET ORAL
COMMUNITY
Start: 2019-12-30

## 2020-01-02 NOTE — PROGRESS NOTES
Subjective     REASON FOR FOLLOW-UP: 1.  History of breast cancer in 1988 status post surgery by Dr. Joseph Mera and tamoxifen for 5 years    2.  New onset portacaval lymphadenopathy    3.  Meningioma followed by Dr. Oliveira                              HISTORY OF PRESENT ILLNESS:  The patient is a 79 y.o. year old female who is here for an opinion about the above issue.    History of Present Illness patient is a 79-year-old female with above mentioned history. Patient underwent hip surgery in May and developed pneumonia as a complication, she is currently still receiving breathing treatments. She is currently using a walker to ambulate. Patient notes fatigue but denies abdominal pain. She states her appetite is healthy and denies weight changes. She denies fevers, chills or night sweats.    We had discussed about obtaining a CT-guided liver biopsy and she states that she went for the  biopsy but they said they could not do it.  Patient currently is totally asymptomatic from the portacaval lymph node.  She does not have fever or chills.  She states that she would rather be observed and if it worsens at that point to consider biopsy.    Interval history: Patient reports feeling well overall.  She now lives at an assisted living.  Her weight is stable.  She denies any abdominal pain.  She has not seen Dr. King yet.  She would like to wait until March to consult Dr. King again for EGD /colonoscopy. Ct SCAN HAD SHOWN THICK ING OF PYLORUS and EGD was suggested but pt did not go to see Dr King. She wants to wait until her family is back to get the procedure done. And CT done.     She consulted a urologist who found a large kidney stone which is not causing any obstruction, so they will continue to monitor it.    Patient's labs from 01/02/20 are normal.    ONCOLOGY HISTORY  history of breast cancer in 1998 status post right mastectomy with axillary dissection, underwent tamoxifen for 5 years.  She had 25 lymph  nodes removed and were negative.  She has done well up until recently she went to see her primary care physician Dr.Walter Fong.  She had right upper extremity lymphedema for the last 2 months and a Doppler ultrasound of the right upper extremity was negative for any DVT.  Patient then has been sent to lymphedema clinic.  She also has bilateral lower extremity edema for quite some time.  As a result CT abdomen pelvis was done by Dr. Mak Matta and it showed a 3.5 cm portacaval lymph node.  She also had a right renal mass which was 4.3 cm which is thought to be slightly solid and she was referred to Dr. Roberts who has seen her and felt he will follow with observation with repeat CT scan.  I have reviewed the chest x-ray as well which is negative except cardiomegaly.  Patient was seen in the past by Dr. Graves and echocardiogram in November 2018 had shown valvular heart disease.  Currently her chest x-ray does show cardiomegaly and she does have shortness of breath with walking.  She does walk with a walker.    She denies any weight loss, no chest pain but does have shortness of breath.  She denies any active GI bleeding.  She has had colonoscopy in the past 5 years ago which was showing a polyp and she was repeat colonoscopy in 5 years which she is overdue now.  This was done by Dr. King in the past.  She is also overdue for her left breast mammogram.  She does not have any fever or night sweats.  She has not lost weight.  But she does feel fatigued.      She has intermittent thrombocytopenia.  She has a mild cough but no evidence of active infection right now.    She also has a history of meningioma that is followed by Dr. Oliveira.  She underwent a recent MRI of the brain and I have reviewed that.  MRI of the brain shows a heterogeneous mass overlying the right frontal lobe superolaterally with associated dural tail enhancement.  The mass is 3 cm.  The most posterior nonenhancing or necrotic component has  "decreased in size slightly from 11.6 mm to 8.6 mm.  Dr. Oliveira wanted to operate on her because she has been complaining of some drowsiness.  Patient does not want to go through surgery on the brain right now.  Patient states that the drowsiness started only when she started Keppra for her seizures 3 months ago.  She likely will require referral to neurology if her drowsiness gets worse.  Clinically she does not look like she has any neurological symptoms.      PA AND LATERAL CHEST 04/10/2019  Moderate cardiomegaly. No evidence of acute disease within  the chest.    CT ABDOMEN AND PELVIS 04/22/2019  Pathological portacaval lymphadenopathy.  Given patient's history of  breast cancer, this is most suggestive of metastatic disease.  There is a solid enhancing 4.3 cm right renal neoplasm. 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 Urology consult  is recommended. Suspected large subserosal fibroid.  Colonic diverticulosis.    CT Chest 05/09/2019- Patchy nodular infiltrates throughout the entire left lung  and at the right lung base consistent with pneumonia, likely due to an  atypical infectious agent. Status post right mastectomy. Otherwise  unremarkable CT imaging of the neck and chest.     Bone scan 05/09/2019-No scintigraphic evidence for bony metastatic disease.    CT Needle Biopsy Lymph Node 06/05/2019-No CT-guided core biopsy.able to be performed due to lesion location.    Past Medical History:   Diagnosis Date   • Acquired hypothyroidism    • Arthritis    • Benign essential hypertension    • Breast cancer (CMS/HCC) 1998    Right-negative LN per patient- mastectomy and 5 years of tamoxifen   • Broken bones    • Chronic kidney disease     \"mass on Kidney\"  urologist says it was benign   • Coronary artery disease     MV - takes propranolol   • Disease of thyroid gland    • Hyperglycemia    • Hypoxia    • Lymphadenopathy    • " MVP (mitral valve prolapse)    • Osteoporosis    • Pneumonia    • Seizures (CMS/HCC) 2018   • Skin cancer 2007        Past Surgical History:   Procedure Laterality Date   • BILATERAL OOPHORECTOMY  1999   • BREAST SURGERY  1998    Biopsy   • MASTECTOMY Right 07/1998   • SKIN BIOPSY     • SKIN LESION EXCISION  2007   • TOTAL HIP ARTHROPLASTY Left 5/12/2019    Procedure: LEFT ANTERIOR TOTAL HIP ARTHROPLASTY;  Surgeon: Jak Hdz MD;  Location: [facility] OR;  Service: Orthopedics        Current Outpatient Medications on File Prior to Visit   Medication Sig Dispense Refill   • acetaminophen (TYLENOL) 325 MG tablet Take 2 tablets by mouth Every 6 (Six) Hours As Needed for Mild Pain  (pain or symptomatic fever).     • alendronate (FOSAMAX) 70 MG tablet TAKE 1 TABLET EVERY 7 DAYS 12 tablet 3   • atorvastatin (LIPITOR) 20 MG tablet TAKE 1 TABLET BY MOUTH EVERY DAY 30 tablet 5   • Calcium Carbonate (CALTRATE 600 PO) Take  by mouth Daily.     • Cholecalciferol (VITAMIN D PO) Take 1,000 Units by mouth Daily.     • fluticasone (FLONASE) 50 MCG/ACT nasal spray 2 sprays by Each Nare route Daily.     • furosemide (LASIX) 40 MG tablet TAKE 1 TABLET BY MOUTH EVERY DAY 90 tablet 0   • levETIRAcetam (KEPPRA) 500 MG tablet TAKE 1 TABLET BY MOUTH TWICE DAILY 180 tablet 1   • levothyroxine (SYNTHROID, LEVOTHROID) 75 MCG tablet Take 1 tablet by mouth Daily. 90 tablet 3   • lisinopril (PRINIVIL,ZESTRIL) 20 MG tablet TAKE 1 TABLET BY MOUTH EVERY DAY 90 tablet 0   • lisinopril (PRINIVIL,ZESTRIL) 5 MG tablet      • PARoxetine (PAXIL) 10 MG tablet Take 1 tablet by mouth Every Morning. 30 tablet 5   • potassium chloride (K-DUR,KLOR-CON) 20 MEQ CR tablet TAKE 1 TABLET BY MOUTH EVERY DAY 90 tablet 0   • propranolol (INDERAL) 40 MG tablet TAKE 1 TABLET TWICE A  tablet 3   • guaiFENesin (MUCINEX) 600 MG 12 hr tablet Take 1 tablet by mouth 2 (Two) Times a Day.       No current facility-administered medications on file prior to  visit.         ALLERGIES:    Allergies   Allergen Reactions   • Shellfish-Derived Products Swelling     shrimp   • Cefaclor Unknown - Low Severity     Pt believes it caused weakness. Pt tolerated ceftriaxone 5/19 admission        Social History     Socioeconomic History   • Marital status: Single     Spouse name: Not on file   • Number of children: Not on file   • Years of education: College   • Highest education level: Not on file   Occupational History   • Occupation: Teacher     Employer: RETIRED   Tobacco Use   • Smoking status: Never Smoker   • Smokeless tobacco: Never Used   Substance and Sexual Activity   • Alcohol use: No   • Drug use: No   • Sexual activity: Defer        Family History   Problem Relation Age of Onset   • Cancer Mother    • Colon cancer Mother 68   • Cancer Maternal Aunt    • Stroke Father    • Cancer Father 90   • Heart disease Father    • Stroke Sister         Review of Systems   Constitutional: Negative for appetite change, chills, diaphoresis, fatigue, fever and unexpected weight change.   HENT: Negative for hearing loss, sore throat and trouble swallowing.    Respiratory: Negative for cough, chest tightness, shortness of breath and wheezing.    Cardiovascular: Negative for chest pain, palpitations and leg swelling.   Gastrointestinal: Negative for abdominal distention, abdominal pain, constipation, diarrhea, nausea and vomiting.   Genitourinary: Negative for dysuria, frequency, hematuria and urgency.   Musculoskeletal: Negative for joint swelling.        No muscle weakness.   Skin: Negative for rash and wound.   Neurological: Negative for seizures, syncope, speech difficulty, weakness, numbness and headaches.   Hematological: Negative for adenopathy. Does not bruise/bleed easily.   Psychiatric/Behavioral: Negative for behavioral problems, confusion and suicidal ideas.     Objective     Vitals:    01/02/20 1003   BP: 119/60   Pulse: 78   Resp: 16   Temp: 97.8 °F (36.6 °C)   TempSrc: Oral  "  SpO2: 92%   Weight: 67.8 kg (149 lb 6.4 oz)   Height: 165.1 cm (65\")   PainSc: 0-No pain      Current Status 1/2/2020   ECOG score 1       Physical Exam    GENERAL:  Well-developed, well-nourished in no acute distress.   SKIN:  Warm, dry without rashes, purpura or petechiae.  NECK:  Supple with good range of motion; no thyromegaly or masses, no JVD.  LYMPHATICS:  No cervical, supraclavicular, axillary or inguinal adenopathy.  CHEST:  Lungs clear to auscultation. Good airflow.  BREAST:   S/p right mastectomy.  No chest wall pain, no palpable masses, no axillary adenopathy or supraclavicular adenopathy.  Left breast: No evidence of any skin changes, no evidence of any left breast mass and no evidence of left nipple discharge as well as no left axillary adenopathy or left supraclavicular adenopathy.  CARDIAC:  Regular rate and rhythm without murmurs, rubs or gallops. Normal S1,S2.  ABDOMEN:  Soft, nontender with no hepatosplenomegaly or masses.  EXTREMITIES:  No clubbing, cyanosis.  She has lymphedema in her right upper extremity.  NEUROLOGICAL:  Cranial Nerves II-XII grossly intact.  No focal neurological deficits.  PSYCHIATRIC:  Normal affect and mood.  ,      RECENT LABS:  Hematology WBC   Date Value Ref Range Status   01/02/2020 7.10 3.40 - 10.80 10*3/mm3 Final   06/25/2019 5.77 3.40 - 10.80 10*3/mm3 Final     RBC   Date Value Ref Range Status   01/02/2020 4.59 3.77 - 5.28 10*6/mm3 Final   06/25/2019 3.93 3.77 - 5.28 10*6/mm3 Final     Hemoglobin   Date Value Ref Range Status   01/02/2020 12.7 12.0 - 15.9 g/dL Final     Hematocrit   Date Value Ref Range Status   01/02/2020 41.0 34.0 - 46.6 % Final     Platelets   Date Value Ref Range Status   01/02/2020 203 140 - 450 10*3/mm3 Final                 Assessment/Plan   1.  History of right breast cancer in 1998 status post right  radical mastectomy, followed by tamoxifen x5 years without evidence of recurrence of 4.  Patient is overdue for left screening " mammogram.    2.  Right upper extremity lymphedema since last 2 months is now followed by the lymphedema clinic.    3.  New portacaval lymph node 3.5 cm in size concerning for metastasis.  Given her history of breast cancer in the past certainly we would need to consider if she has evidence of any metastasis versus a new malignancy.  CT scan also showed evidence of a right kidney mass for which patient was seen by urologist Dr. Roberts who is going to follow that with observation with repeat CT in several months.  Reviewed CT scan and there is no evidence of any other lymphadenopathy and liver appears normal.  We will need to consider CT-guided biopsy of the portacaval lymph node  We will also obtain cancer markers today.  · CA 15-3 is 15.8 normal,  · CA 19-9 is 11.0, CEA is 1.43  · Patient scheduled for CT-guided biopsy but not done secondary to location  · Repeat CT suggests thickening of the stomach as of September 5, 2019.  But patient refuses endoscopy.  There is stability of the intra-abdominal lymph aortocaval lymph    4.  New onset right renal mass followed by Dr. Roberts, we will get his records.  He is following with observation and repeat CT.  · Reviewed CT from September 5, 2019 and the renal mass is stable.  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    5.  Bilateral lower extremity edema with cardiomegaly, reviewed his previous echocardiogram and it shows valvular heart disease and we will refer her to Dr. Graves.    6.  Meningioma followed by Dr. Oliveira who was considering surgery but patient wants to wait.  I have reviewed her recent MRI of the brain done March 2019.    7.  Seizure, new onset for which she has been on Keppra since last 3 months.  She has drowsiness since last 3 months she has been on Keppra.    8.  Thrombocytopenia, she has had intermittent thrombocytopenia    Plan  1. Refer to GI, Dr King  in March for EGD.  [facility]. [facility] 2nd [facility] 3rd week of March with labs; CT scans 1  week prior  3. REVIEWED MAMMOGRAM FROM may 2019 and negative,    I, Elena Santana, acted as scribe for Kristi Elizabeth MD  in documenting the service or procedure. To the best of my knowledge, I recorded what was dictated by Kristi Elizabeth MD.    Kristi Elizabeth MD  01/02/20        Kristi Elizabeth MD    CC- Dr. Mak Lemos

## 2020-01-10 ENCOUNTER — HOSPITAL ENCOUNTER (OUTPATIENT)
Dept: MRI IMAGING | Facility: HOSPITAL | Age: 80
Discharge: HOME | End: 2020-01-10
Admitting: NEUROLOGICAL SURGERY

## 2020-01-10 ENCOUNTER — TELEPHONE (OUTPATIENT)
Dept: NEUROSURGERY | Facility: CLINIC | Age: 80
End: 2020-01-10

## 2020-01-10 DIAGNOSIS — D32.0 MENINGIOMA, CEREBRAL (HCC): ICD-10-CM

## 2020-01-10 PROCEDURE — 70553 MRI BRAIN STEM W/O & W/DYE: CPT

## 2020-01-10 PROCEDURE — 0 GADOBENATE DIMEGLUMINE 529 MG/ML SOLUTION: Performed by: NEUROLOGICAL SURGERY

## 2020-01-10 PROCEDURE — A9577 INJ MULTIHANCE: HCPCS | Performed by: NEUROLOGICAL SURGERY

## 2020-01-10 RX ADMIN — GADOBENATE DIMEGLUMINE 13 ML: 529 INJECTION, SOLUTION INTRAVENOUS at 14:21

## 2020-01-10 NOTE — TELEPHONE ENCOUNTER
LM for patient letting her know that her appt has been rescheduled from 1.20.20 as Dr HERNANDEZ has to be out.  I have rescheduled her for 2.12.20  10:15 am

## 2020-01-17 ENCOUNTER — HOSPITAL ENCOUNTER (OUTPATIENT)
Dept: OCCUPATIONAL THERAPY | Facility: HOSPITAL | Age: 80
Setting detail: THERAPIES SERIES
Discharge: HOME | End: 2020-01-17

## 2020-01-17 DIAGNOSIS — I97.2 POST-MASTECTOMY LYMPHEDEMA SYNDROME: Primary | ICD-10-CM

## 2020-01-17 PROCEDURE — 97535 SELF CARE MNGMENT TRAINING: CPT

## 2020-01-17 NOTE — THERAPY PROGRESS REPORT/RE-CERT
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   • Hyperglycemia   • Lymphedema of arm   • History of right breast cancer   • Renal mass, right   • Lymphadenopathy   • Left displaced femoral neck fracture (CMS/HCC)   • Pneumonia   • Hypoxia   • Acute bronchiolitis due to human metapneumovirus   • Pulmonary hypertension (CMS/HCC)   • History of pneumonia   • Mitral regurgitation   • Tricuspid regurgitation        Past Medical History:   Diagnosis Date   • Acquired hypothyroidism    • Arthritis    • Benign essential hypertension    • Breast cancer (CMS/HCC)     Right-negative LN per patient- mastectomy and 5 years of tamoxifen   • Broken bones    • Chronic kidney disease     \"mass on Kidney\"  urologist says it was benign   • Coronary artery disease     MV - takes propranolol   • Disease of thyroid gland    • Hyperglycemia    • Hypoxia    • Lymphadenopathy    • MVP (mitral valve prolapse)    • Osteoporosis    • Pneumonia    • Seizures (CMS/HCC)    • Skin cancer         Past Surgical History:   Procedure Laterality Date   • BILATERAL OOPHORECTOMY     • BREAST SURGERY      Biopsy   • MASTECTOMY Right 1998   • SKIN BIOPSY     • SKIN LESION EXCISION     • TOTAL HIP ARTHROPLASTY Left 2019    Procedure: LEFT ANTERIOR TOTAL HIP ARTHROPLASTY;  Surgeon: Jak Hdz MD;  Location: [facility] OR;  Service: Orthopedics         Visit Dx:      ICD-10-CM ICD-9-CM   1. 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  yes  -RE      Pre-Treatment Pain Level  0  -RE      Post-Treatment Pain Level  0  -RE         Subjective Comments    Subjective Comments  no new c/o  -RE         Compression/Skin Care    Compression/Skin Care Comments  Fit with [employer] garments. Added hand compression pad to gauntlet  -RE        User Key  (r) = Recorded By, (t) = Taken By, (c) = Cosigned By    Initials Name Provider Type    Poornima Torres OTHAYDE Occupational Therapist                  OT Assessment/Plan     Row Name 01/17/20 1740          OT Assessment    Assessment Comments  Sleeve fits very well.  Gauntlet is big but a smaller size would not fit her wrist.  added pad to back of hand which appears to fit well.  No need for further tx here unless encounters problems.    -RE        OT Plan    OT Plan Comments  follow PRN  -RE       User Key  (r) = Recorded By, (t) = Taken By, (c) = Cosigned By    Initials Name Provider Type    Poornima Torres OTR Occupational Therapist                OT Goals     Row Name 01/17/20 1700          OT Short Term Goals    STG 1  Patient will demonstrate proper awareness of “What is Lymphedema?” and “Healthy Habits” for improved prevention, management, care of symptoms and ease of transition to self-care of condition.   -RE     STG 1 Progress  Met  -RE     STG 2  Patient independent and compliant with self-wrapping techniques of compression bandages with family member as indicated for improved self-management of condition.  -RE     STG 2 Progress  Met  -RE     STG 3  Patient will demonstrate decreased net edema of right upper extremity >/= 10-20 cm for decrease in symptoms, decreased risk of infection and improved skin care/transition to self-care of condition.   -RE     STG 3 Progress  Met  -RE        Long Term Goals    LTG Date to Achieve  01/17/20  -RE     LTG 1  Patient will demonstrate  decreased net edema of right upper extremity >/= 21-30 cm for decrease in symptoms, decreased risk of infection and improved skin care/transition to self-care of condition.   -RE     LTG 1 Progress  Met  -RE     LTG 2  Patient independent and compliant with initial home exercise program focused on diaphragmatic breathing, range of motion, flexibility to decrease edema and improve lymphatic flow for decreased edema and decreased risk of infection.   -RE     LTG 2 Progress  Met  -RE     LTG 3  Patient independent and compliant with compression garments and night compression as indicated for self-management of edema.   -RE     LTG 3 Progress  Met  -RE        Time Calculation    OT Goal Re-Cert Due Date  03/20/20  -RE       User Key  (r) = Recorded By, (t) = Taken By, (c) = Cosigned By    Initials Name Provider Type    Poornima Torres OTR Occupational Therapist          Therapy Education  Education Details: Provided oral and written instuctions regarding donning and doffing of garments as well as use and care. pt to use garments during day and velcro product at night .  Nursiing staff to assisst.   Given: Edema management  Program: New  How Provided: Verbal, Demonstration, Written  Provided to: Patient, Caregiver  Level of Understanding: Teach back education performed, Verbalized  30375 - OT Self Care/Mgmt Minutes: 60                Time Calculation:   OT Start Time: 1400  OT Stop Time: 1500  OT Time Calculation (min): 60 min  Total Timed Code Minutes- OT: 60 minute(s)     Therapy Charges for Today     Code Description Service Date Service Provider Modifiers Qty    34888033802 HC OT SELF CARE/MGMT/TRAIN EA 15 MIN 1/17/2020 Poornima Middleton OTR GO 4                      SURY Mae  1/17/2020

## 2020-02-06 NOTE — PROGRESS NOTES
Subjective   Patient ID: Karolina Ornelas is a 79 y.o. female is here today for follow-up to discuss brain MRI results.  She is currently residing at [facility] permanently.  She was getting PT/OT/gait therapy, however, she finished 2 weeks ago.  She has a history of seizures, but does not see a neurologist.  She does have lymphedema ( post mastectomy) is wearing a sleeve    Patient was last seen 7.31.19 by Dr Oliveira for meningioma follow up.  Patient was offered resection ( surgery) and declined at that time    Patient states that she is not having any headaches, she states that she had a seizure 2.3.20, the first time she has had one since 2018.  Patient states that she had quick episode of left arm/hand weakness.  She was evaluated by the physician at the facility where she resides and was told she had a seizure.  She is taking Keppra 500 mg BID that was originally prescribed by Dr Oliveira and is not being managed by the facility.  She denies double or blurry vision, her last eye exam was in November. She does have problems with her balance and gait and is using a walker to ambulate with.  She denies problems with her memory or any cognitive problems.      This very nice lady is seen with a friend of her's.  She has been followed for the last 2 years by Dr. Oliveira for a large convexity meningioma over 4 cm in size with surrounding edema.  It was discovered because of some seizure activity involving the left hand.  She has been on Keppra 500 mg b.i.d.  She was stable when she was seen by Dr. Oliveira about a year ago, but she had a seizure that was a focal seizure involving some motor activity that lasted about 5 minutes and then went away.  I told her that is worrisome given the edema and I wholeheartedly agree with Dr. Oliveira that this should be removed when it was discovered, but the patient did not want to do that.  I reiterated again that I agree with Dr. Oliveira, and that while I understand that she does not want to  do it, we are getting some warning signs.  I told her that if she develops status epilepticus that could be fatal.  She understands this.  There are some cardiac issues, but it does not seem that they are insurmountable.  If we ever get to the point of discussing surgery, she would have to be cleared by Dr. Prince, her Cardiologist.  But for now, we will raise the dose to 750 mg b.i.d. and have her come back in 4 months for a clinical follow-up.  No imaging at that time.             Difficulty Walking   The problem occurs constantly. The problem has been unchanged. Pertinent negatives include no headaches, numbness or weakness.       The following portions of the patient's history were reviewed and updated as appropriate: allergies, current medications, past family history, past medical history, past social history, past surgical history and problem list.    Review of Systems   Eyes: Negative for visual disturbance.   Musculoskeletal: Positive for gait problem.   Neurological: Positive for seizures. Negative for dizziness, tremors, syncope, facial asymmetry, speech difficulty, weakness, light-headedness, numbness and headaches.   Psychiatric/Behavioral: Negative for confusion, decreased concentration and dysphoric mood.   All other systems reviewed and are negative.      Objective   Physical Exam   Constitutional: She is oriented to person, place, and time. She appears well-developed and well-nourished.   HENT:   Head: Normocephalic and atraumatic.   Eyes: Pupils are equal, round, and reactive to light. Conjunctivae and EOM are normal.   Fundoscopic exam:       The right eye shows no papilledema. The right eye shows venous pulsations.        The left eye shows no papilledema. The left eye shows venous pulsations.   Neck: Carotid bruit is not present.   Neurological: She is oriented to person, place, and time. She has a normal Finger-Nose-Finger Test and a normal Heel to Shin Test. Gait normal.   Reflex Scores:        Tricep reflexes are 2+ on the right side and 2+ on the left side.       Bicep reflexes are 2+ on the right side and 2+ on the left side.       Brachioradialis reflexes are 2+ on the right side and 2+ on the left side.       Patellar reflexes are 2+ on the right side and 2+ on the left side.       Achilles reflexes are 2+ on the right side and 2+ on the left side.  Psychiatric: Her speech is normal.     Neurologic Exam     Mental Status   Oriented to person, place, and time.   Registration of memory: Good recent and remote memory.   Attention: normal. Concentration: normal.   Speech: speech is normal   Level of consciousness: alert  Knowledge: consistent with education.     Cranial Nerves     CN II   Visual fields full to confrontation.   Visual acuity: normal    CN III, IV, VI   Pupils are equal, round, and reactive to light.  Extraocular motions are normal.     CN V   Facial sensation intact.   Right corneal reflex: normal  Left corneal reflex: normal    CN VII   Facial expression full, symmetric.   Right facial weakness: none  Left facial weakness: none    CN VIII   Hearing: intact    CN IX, X   Palate: symmetric    CN XI   Right sternocleidomastoid strength: normal  Left sternocleidomastoid strength: normal    CN XII   Tongue: not atrophic  Tongue deviation: none    Motor Exam   Muscle bulk: normal  Right arm tone: normal  Left arm tone: normal  Right leg tone: normal  Left leg tone: normal    Strength   Strength 5/5 except as noted.     Sensory Exam   Light touch normal.     Gait, Coordination, and Reflexes     Gait  Gait: normal    Coordination   Finger to nose coordination: normal  Heel to shin coordination: normal    Reflexes   Right brachioradialis: 2+  Left brachioradialis: 2+  Right biceps: 2+  Left biceps: 2+  Right triceps: 2+  Left triceps: 2+  Right patellar: 2+  Left patellar: 2+  Right achilles: 2+  Left achilles: 2+  Right : 2+  Left : 2+      Assessment/Plan   Independent Review of  Radiographic Studies:    I reviewed the brain MRI done on 1/10/2020 which shows no significant change in the right frontal meningioma measuring 4 x 3.5 x 3.5 cm with surrounding edema that measures 9.5 x 5.5 x 5 cm.  Agree with the report.      Medical Decision Making:    She is slightly more symptomatic having had a seizure on Keppra.  Will raise the dose to 750 mg twice daily and have her come back in 4 months.  Frankly, I agree with Dr. Oliveira that she should have had this removed a while back but she still does not want to.  We will revisit this issue in 4 months and can make a stronger case for it if she continues to have seizure activity.      Karolina was seen today for seizures and difficulty walking.    Diagnoses and all orders for this visit:    Meningioma, cerebral (CMS/HCC)    Seizure (CMS/HCC)    Other orders  -     levETIRAcetam (KEPPRA) 500 MG tablet; Take 1.5 tablets by mouth 2 (Two) Times a Day.      Return in about 4 months (around 6/12/2020).

## 2020-02-10 ENCOUNTER — TELEPHONE (OUTPATIENT)
Dept: ONCOLOGY | Facility: CLINIC | Age: 80
End: 2020-02-10

## 2020-02-10 NOTE — TELEPHONE ENCOUNTER
Message forwarded to Dr. Elizabeth to see whether patient's f/u needs to be pushed back so that EGD will have been done beforehand. Notified pt, she v/u.

## 2020-02-10 NOTE — TELEPHONE ENCOUNTER
Pt saw Dr. Elizabeth on 1/20.  She is having endoscopy with Dr. King on 3/30.   She has appt 3/19 with Dr. Elizabeth.  Is she supposed to see Dr. Elizabeth before she sees Dr. King?  Or does the 3/19 appointment need to be cancelled and r/s until after 3/30?    [phone]  Ok to leave message if patient can't answer.

## 2020-02-12 ENCOUNTER — TELEPHONE (OUTPATIENT)
Dept: ONCOLOGY | Facility: CLINIC | Age: 80
End: 2020-02-12

## 2020-02-12 ENCOUNTER — OFFICE VISIT (OUTPATIENT)
Dept: NEUROSURGERY | Facility: CLINIC | Age: 80
End: 2020-02-12

## 2020-02-12 VITALS
BODY MASS INDEX: 24.86 KG/M2 | DIASTOLIC BLOOD PRESSURE: 74 MMHG | SYSTOLIC BLOOD PRESSURE: 138 MMHG | HEIGHT: 65 IN | HEART RATE: 74 BPM

## 2020-02-12 DIAGNOSIS — R56.9 SEIZURE (HCC): ICD-10-CM

## 2020-02-12 DIAGNOSIS — D32.0 MENINGIOMA, CEREBRAL (HCC): Primary | ICD-10-CM

## 2020-02-12 PROCEDURE — 99214 OFFICE O/P EST MOD 30 MIN: CPT | Performed by: NEUROLOGICAL SURGERY

## 2020-02-12 RX ORDER — LEVETIRACETAM 500 MG/1
750 TABLET ORAL 2 TIMES DAILY
Qty: 180 TABLET | Refills: 3 | Status: SHIPPED | OUTPATIENT
Start: 2020-02-12

## 2020-02-12 NOTE — TELEPHONE ENCOUNTER
----- Message from Kristi Elizabeth MD sent at 2/12/2020  2:57 PM EST -----  You can more the appointment after she sees Dr. King.  Please notify the patient about that.    Kristi Elizabeth MD  ----- Message -----  From: Latoya Brown RN  Sent: 2/10/2020   2:00 PM EST  To: MD Dr. MAGDY Paniagua,    Patient sees you before she will have EGD done w/ Dr. King. She is asking if she needs to move out her appt with you until after the EGD has been done. Please advise. Please respond to clinical pool.  ----------------------------------------------------------------------------------------  Pt saw Dr. Elizabeth on 1/20.  She is having endoscopy with Dr. King on 3/30.   She has appt 3/19 with Dr. Elizabeth.  Is she supposed to see Dr. Elizabeth before she sees Dr. King?  Or does the 3/19 appointment need to be cancelled and r/s until after 3/30?     [phone]  Ok to leave message if patient can't answer.

## 2020-03-17 ENCOUNTER — TELEPHONE (OUTPATIENT)
Dept: ONCOLOGY | Facility: CLINIC | Age: 80
End: 2020-03-17

## 2020-03-17 NOTE — TELEPHONE ENCOUNTER
Patient called to cancel appointments on 4/1. She said she may call back to reschedule, but she may not.

## 2020-04-03 ENCOUNTER — DOCUMENTATION (OUTPATIENT)
Dept: OCCUPATIONAL THERAPY | Facility: HOSPITAL | Age: 80
End: 2020-04-03

## 2020-04-03 DIAGNOSIS — I97.2 POST-MASTECTOMY LYMPHEDEMA SYNDROME: Primary | ICD-10-CM

## 2020-04-03 NOTE — THERAPY DISCHARGE NOTE
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   • Hyperglycemia   • Lymphedema of arm   • History of right breast cancer   • Renal mass, right   • Lymphadenopathy   • Left displaced femoral neck fracture (CMS/HCC)   • Pneumonia   • Hypoxia   • Acute bronchiolitis due to human metapneumovirus   • Pulmonary hypertension (CMS/HCC)   • History of pneumonia   • Mitral regurgitation   • Tricuspid regurgitation        Past Medical History:   Diagnosis Date   • Acquired hypothyroidism    • Arthritis    • Benign essential hypertension    • Breast cancer (CMS/HCC)     Right-negative LN per patient- mastectomy and 5 years of tamoxifen   • Broken bones    • Chronic kidney disease     \"mass on Kidney\"  urologist says it was benign   • Coronary artery disease     MV - takes propranolol   • Disease of thyroid gland    • Hyperglycemia    • Hypoxia    • Lymphadenopathy    • MVP (mitral valve prolapse)    • Osteoporosis    • Pneumonia    • Seizures (CMS/HCC)    • Skin cancer         Past Surgical History:   Procedure Laterality Date   • BILATERAL OOPHORECTOMY     • BREAST SURGERY      Biopsy   • MASTECTOMY Right 1998   • SKIN BIOPSY     • SKIN LESION EXCISION     • TOTAL HIP ARTHROPLASTY Left 2019    Procedure: LEFT ANTERIOR TOTAL HIP ARTHROPLASTY;  Surgeon: Jak Hdz MD;  Location: [facility];  Service: Orthopedics         Visit Dx:      ICD-10-CM ICD-9-CM   1. Post-mastectomy " lymphedema syndrome I97.2 457.0                                   OT Goals     Row Name 04/03/20 1300          OT Short Term Goals    STG 1  Patient will demonstrate proper awareness of “What is Lymphedema?” and “Healthy Habits” for improved prevention, management, care of symptoms and ease of transition to self-care of condition.   -RE     STG 1 Progress  Met  -RE     STG 2  Patient independent and compliant with self-wrapping techniques of compression bandages with family member as indicated for improved self-management of condition.  -RE     STG 2 Progress  Met  -RE     STG 3  Patient will demonstrate decreased net edema of right upper extremity >/= 10-20 cm for decrease in symptoms, decreased risk of infection and improved skin care/transition to self-care of condition.   -RE     STG 3 Progress  Met  -RE        Long Term Goals    LTG 1  Patient will demonstrate decreased net edema of right upper extremity >/= 21-30 cm for decrease in symptoms, decreased risk of infection and improved skin care/transition to self-care of condition.   -RE     LTG 1 Progress  Met  -RE     LTG 2  Patient independent and compliant with initial home exercise program focused on diaphragmatic breathing, range of motion, flexibility to decrease edema and improve lymphatic flow for decreased edema and decreased risk of infection.   -RE     LTG 2 Progress  Met  -RE     LTG 3  Patient independent and compliant with compression garments and night compression as indicated for self-management of edema.   -RE     LTG 3 Progress  Met  -RE       User Key  (r) = Recorded By, (t) = Taken By, (c) = Cosigned By    Initials Name Provider Type    Poornima Torres OTR Occupational Therapist                           Time Calculation:                     OP OT Discharge Summary  Reason for Discharge: All goals achieved  Outcomes Achieved: Able to achieve all goals within established timeline  Discharge Destination: Home with home program      Poornima  Emi, OTR  4/3/2020

## 2021-03-02 DIAGNOSIS — Z23 IMMUNIZATION DUE: ICD-10-CM

## (undated) DEVICE — 1010 S-DRAPE TOWEL DRAPE 10/BX: Brand: STERI-DRAPE™

## (undated) DEVICE — Device

## (undated) DEVICE — PICO 7 10CM X 30CM: Brand: PICO™ 7

## (undated) DEVICE — SKIN PREP TRAY W/CHG: Brand: MEDLINE INDUSTRIES, INC.

## (undated) DEVICE — SHEET, DRAPE, SPLIT, STERILE: Brand: MEDLINE

## (undated) DEVICE — DRSNG WND STRIP OPTIFOAM AG SUPRABS A/MIC 4X8IN STRL

## (undated) DEVICE — GLV SURG TRIUMPH CLASSIC PF LTX 8 STRL

## (undated) DEVICE — OPTIFOAM GENTLE SA, POSTOP, 4X8: Brand: MEDLINE

## (undated) DEVICE — SYR CONTRL LUERLOK 10CC

## (undated) DEVICE — ENCORE® LATEX ORTHO SIZE 8, STERILE LATEX POWDER-FREE SURGICAL GLOVE: Brand: ENCORE

## (undated) DEVICE — SUT MNCRYL 3/0 PS2 18IN MCP497G

## (undated) DEVICE — APPL CHLORAPREP W/TINT 26ML ORNG

## (undated) DEVICE — DRSNG WND GEL FIBR OPTICELL AG PLS W/SLV LF 4X5IN  STRL

## (undated) DEVICE — MAT FLR ABSORBENT LG 4FT 10 2.5FT

## (undated) DEVICE — 3M™ STERI-STRIP™ REINFORCED ADHESIVE SKIN CLOSURES, R1547, 1/2 IN X 4 IN (12 MM X 100 MM), 6 STRIPS/ENVELOPE: Brand: 3M™ STERI-STRIP™

## (undated) DEVICE — PK ANT HIP 40

## (undated) DEVICE — PREP IM ENCHANCED TOTAL HIP BONE                                    PREPARATION KIT: Brand: PREP-IM

## (undated) DEVICE — GLV SURG BIOGEL LTX PF 8

## (undated) DEVICE — SOL ISO/ALC RUB 70PCT 4OZ